# Patient Record
Sex: FEMALE | Race: BLACK OR AFRICAN AMERICAN | HISPANIC OR LATINO | Employment: UNEMPLOYED | URBAN - METROPOLITAN AREA
[De-identification: names, ages, dates, MRNs, and addresses within clinical notes are randomized per-mention and may not be internally consistent; named-entity substitution may affect disease eponyms.]

---

## 2017-06-05 ENCOUNTER — TRANSCRIBE ORDERS (OUTPATIENT)
Dept: ADMINISTRATIVE | Facility: HOSPITAL | Age: 52
End: 2017-06-05

## 2017-06-05 ENCOUNTER — TRANSCRIBE ORDERS (OUTPATIENT)
Dept: RADIOLOGY | Facility: HOSPITAL | Age: 52
End: 2017-06-05

## 2017-06-05 ENCOUNTER — HOSPITAL ENCOUNTER (OUTPATIENT)
Dept: RADIOLOGY | Facility: HOSPITAL | Age: 52
Discharge: HOME | End: 2017-06-05
Payer: COMMERCIAL

## 2017-06-05 DIAGNOSIS — G89.29 CHRONIC IDIOPATHIC ANAL PAIN: ICD-10-CM

## 2017-06-05 DIAGNOSIS — M19.90 SENILE ARTHRITIS: ICD-10-CM

## 2017-06-05 DIAGNOSIS — K62.89 CHRONIC IDIOPATHIC ANAL PAIN: ICD-10-CM

## 2017-06-05 DIAGNOSIS — R29.898 OTHER SYMPTOMS REFERABLE TO JOINT, SITE UNSPECIFIED: ICD-10-CM

## 2017-06-05 DIAGNOSIS — M19.90 SENILE ARTHRITIS: Primary | ICD-10-CM

## 2017-06-05 DIAGNOSIS — M79.10 MYALGIA: Primary | ICD-10-CM

## 2017-06-05 PROCEDURE — 72040 X-RAY EXAM NECK SPINE 2-3 VW: CPT

## 2017-07-17 ENCOUNTER — HOSPITAL ENCOUNTER (EMERGENCY)
Facility: HOSPITAL | Age: 52
Discharge: HOME | End: 2017-07-17
Attending: EMERGENCY MEDICINE | Admitting: EMERGENCY MEDICINE
Payer: COMMERCIAL

## 2017-07-17 VITALS
TEMPERATURE: 98.5 F | RESPIRATION RATE: 16 BRPM | SYSTOLIC BLOOD PRESSURE: 104 MMHG | HEART RATE: 77 BPM | DIASTOLIC BLOOD PRESSURE: 52 MMHG | OXYGEN SATURATION: 99 % | WEIGHT: 136 LBS

## 2017-07-17 DIAGNOSIS — M46.1 INFLAMMATION OF RIGHT SACROILIAC JOINT (HCC): Primary | ICD-10-CM

## 2017-07-17 PROCEDURE — 96372 THER/PROPH/DIAG INJ SC/IM: CPT

## 2017-07-17 PROCEDURE — 99283 EMERGENCY DEPT VISIT LOW MDM: CPT

## 2017-07-17 RX ORDER — METHOCARBAMOL 500 MG/1
500 TABLET, FILM COATED ORAL 2 TIMES DAILY
Qty: 20 TABLET | Refills: 0 | Status: SHIPPED | OUTPATIENT
Start: 2017-07-17 | End: 2017-07-27 | Stop reason: ALTCHOICE

## 2017-07-17 RX ORDER — KETOROLAC TROMETHAMINE 30 MG/ML
30 INJECTION, SOLUTION INTRAMUSCULAR; INTRAVENOUS ONCE
Status: COMPLETED | OUTPATIENT
Start: 2017-07-17 | End: 2017-07-17

## 2017-07-17 RX ORDER — IBUPROFEN 400 MG/1
400 TABLET ORAL EVERY 6 HOURS PRN
Qty: 30 TABLET | Refills: 0 | Status: SHIPPED | OUTPATIENT
Start: 2017-07-17 | End: 2017-07-27 | Stop reason: ALTCHOICE

## 2017-07-17 RX ORDER — LIDOCAINE 50 MG/G
1 PATCH TOPICAL ONCE
Status: DISCONTINUED | OUTPATIENT
Start: 2017-07-17 | End: 2017-07-17 | Stop reason: HOSPADM

## 2017-07-17 RX ADMIN — KETOROLAC TROMETHAMINE 30 MG: 30 INJECTION, SOLUTION INTRAMUSCULAR at 14:39

## 2017-07-17 RX ADMIN — LIDOCAINE 1 PATCH: 50 PATCH CUTANEOUS at 14:40

## 2017-07-20 ENCOUNTER — HOSPITAL ENCOUNTER (OUTPATIENT)
Dept: RADIOLOGY | Facility: HOSPITAL | Age: 52
Discharge: HOME | End: 2017-07-20
Payer: COMMERCIAL

## 2017-07-20 ENCOUNTER — TRANSCRIBE ORDERS (OUTPATIENT)
Dept: RADIOLOGY | Facility: HOSPITAL | Age: 52
End: 2017-07-20

## 2017-07-20 DIAGNOSIS — S39.012S: ICD-10-CM

## 2017-07-20 DIAGNOSIS — S39.012S: Primary | ICD-10-CM

## 2017-07-20 PROCEDURE — 72100 X-RAY EXAM L-S SPINE 2/3 VWS: CPT

## 2017-07-27 ENCOUNTER — HOSPITAL ENCOUNTER (EMERGENCY)
Facility: HOSPITAL | Age: 52
Discharge: HOME | End: 2017-07-27
Payer: COMMERCIAL

## 2017-07-27 VITALS
OXYGEN SATURATION: 99 % | RESPIRATION RATE: 18 BRPM | DIASTOLIC BLOOD PRESSURE: 72 MMHG | BODY MASS INDEX: 25.52 KG/M2 | HEIGHT: 60 IN | SYSTOLIC BLOOD PRESSURE: 132 MMHG | HEART RATE: 76 BPM | WEIGHT: 130 LBS | TEMPERATURE: 97.6 F

## 2017-07-27 DIAGNOSIS — M46.1 SACROILIITIS (HCC): Primary | ICD-10-CM

## 2017-07-27 PROCEDURE — 99283 EMERGENCY DEPT VISIT LOW MDM: CPT

## 2017-07-27 RX ORDER — BACLOFEN 10 MG/1
10 TABLET ORAL 3 TIMES DAILY
Qty: 12 TABLET | Refills: 0 | Status: SHIPPED | OUTPATIENT
Start: 2017-07-27 | End: 2018-06-10 | Stop reason: ALTCHOICE

## 2017-07-27 RX ORDER — METHYLPREDNISOLONE 4 MG/1
TABLET ORAL
Qty: 21 TABLET | Refills: 0 | Status: SHIPPED | OUTPATIENT
Start: 2017-07-27 | End: 2018-04-24

## 2017-08-03 ENCOUNTER — GENERIC CONVERSION - ENCOUNTER (OUTPATIENT)
Dept: OTHER | Facility: OTHER | Age: 52
End: 2017-08-03

## 2017-08-03 ENCOUNTER — APPOINTMENT (OUTPATIENT)
Dept: PHYSICAL THERAPY | Facility: OTHER | Age: 52
End: 2017-08-03
Payer: COMMERCIAL

## 2017-08-03 PROCEDURE — 97161 PT EVAL LOW COMPLEX 20 MIN: CPT

## 2017-08-07 ENCOUNTER — APPOINTMENT (OUTPATIENT)
Dept: PHYSICAL THERAPY | Facility: OTHER | Age: 52
End: 2017-08-07
Payer: COMMERCIAL

## 2017-08-07 PROCEDURE — 97140 MANUAL THERAPY 1/> REGIONS: CPT

## 2017-08-07 PROCEDURE — 97110 THERAPEUTIC EXERCISES: CPT

## 2017-08-11 ENCOUNTER — APPOINTMENT (OUTPATIENT)
Dept: PHYSICAL THERAPY | Facility: OTHER | Age: 52
End: 2017-08-11
Payer: COMMERCIAL

## 2017-08-11 PROCEDURE — 97140 MANUAL THERAPY 1/> REGIONS: CPT

## 2017-08-11 PROCEDURE — 97110 THERAPEUTIC EXERCISES: CPT

## 2017-08-14 ENCOUNTER — APPOINTMENT (OUTPATIENT)
Dept: PHYSICAL THERAPY | Facility: OTHER | Age: 52
End: 2017-08-14
Payer: COMMERCIAL

## 2017-08-18 ENCOUNTER — APPOINTMENT (OUTPATIENT)
Dept: PHYSICAL THERAPY | Facility: OTHER | Age: 52
End: 2017-08-18
Payer: COMMERCIAL

## 2017-08-21 ENCOUNTER — APPOINTMENT (OUTPATIENT)
Dept: PHYSICAL THERAPY | Facility: OTHER | Age: 52
End: 2017-08-21
Payer: COMMERCIAL

## 2017-08-25 ENCOUNTER — APPOINTMENT (OUTPATIENT)
Dept: PHYSICAL THERAPY | Facility: OTHER | Age: 52
End: 2017-08-25
Payer: COMMERCIAL

## 2017-08-28 ENCOUNTER — APPOINTMENT (OUTPATIENT)
Dept: PHYSICAL THERAPY | Facility: OTHER | Age: 52
End: 2017-08-28
Payer: COMMERCIAL

## 2018-04-24 ENCOUNTER — OFFICE VISIT (OUTPATIENT)
Dept: OBGYN CLINIC | Facility: HOSPITAL | Age: 53
End: 2018-04-24
Payer: COMMERCIAL

## 2018-04-24 VITALS
WEIGHT: 140.8 LBS | SYSTOLIC BLOOD PRESSURE: 95 MMHG | HEART RATE: 82 BPM | HEIGHT: 60 IN | DIASTOLIC BLOOD PRESSURE: 61 MMHG | BODY MASS INDEX: 27.64 KG/M2

## 2018-04-24 DIAGNOSIS — Z12.39 BREAST CANCER SCREENING: ICD-10-CM

## 2018-04-24 DIAGNOSIS — B96.89 BACTERIAL VAGINOSIS: ICD-10-CM

## 2018-04-24 DIAGNOSIS — Z01.419 WOMEN'S ANNUAL ROUTINE GYNECOLOGICAL EXAMINATION: Primary | ICD-10-CM

## 2018-04-24 DIAGNOSIS — N76.0 BACTERIAL VAGINOSIS: ICD-10-CM

## 2018-04-24 PROCEDURE — 99396 PREV VISIT EST AGE 40-64: CPT | Performed by: NURSE PRACTITIONER

## 2018-04-24 PROCEDURE — 3725F SCREEN DEPRESSION PERFORMED: CPT | Performed by: NURSE PRACTITIONER

## 2018-04-24 PROCEDURE — 87210 SMEAR WET MOUNT SALINE/INK: CPT | Performed by: NURSE PRACTITIONER

## 2018-04-24 RX ORDER — METRONIDAZOLE 500 MG/1
500 TABLET ORAL EVERY 12 HOURS SCHEDULED
Qty: 14 TABLET | Refills: 0 | Status: SHIPPED | OUTPATIENT
Start: 2018-04-24 | End: 2018-05-01

## 2018-04-24 NOTE — PROGRESS NOTES
Subjective      Madison Marin is a 48 y o  female who presents for annual well woman exam  Last pap 2015 resulted ASCUS and HPV negative  Hysterectomy approximately 2-3 years ago for fibroids  Denies intermenstrual bleeding, spotting, or discharge  Current contraception: status post hysterectomy  History of abnormal Pap smear: yes -   Family history of uterine or ovarian cancer: no  Regular self breast exam: yes  History of abnormal mammogram: no  Family history of breast cancer: no  History of abnormal lipids: no  Menstrual History:  OB History      Para Term  AB Living    2 2 1 1   2    SAB TAB Ectopic Multiple Live Births                      Menarche age: 18-   Amenorrhea since hysterectomy approximately 2-3 years ago  Hysterectomy was done for fibroid uterus  Menstrual history below  Patient's last menstrual period was 2012  Period Cycle (Days):  (monthly)  Period Duration (Days): 5-6 days    The following portions of the patient's history were reviewed and updated as appropriate: allergies, current medications, past family history, past medical history, past social history, past surgical history and problem list     Review of Systems  Pertinent items are noted in HPI        Objective      BP 95/61 (BP Location: Left arm)   Pulse 82   Ht 5' (1 524 m)   Wt 63 9 kg (140 lb 12 8 oz)   LMP 2012   BMI 27 50 kg/m²     General:   alert and oriented, in no acute distress, alert, appears stated age and cooperative   Heart: regular rate and rhythm, S1, S2 normal, no murmur, click, rub or gallop   Lungs: clear to auscultation bilaterally   Abdomen: soft, non-tender, without masses or organomegaly, nontender and no masses palpated   Vulva: normal   Vagina: thin grey discharge, pH 5 0, wet prep done, + whiff   Cervix: surgically absent   Uterus: surgically absent   Adnexa: no mass, fullness, tenderness    breast: nontender, no palpable masses, no skin changes no nipple discharge bilaterally  Assessment      @[email or web address]@   Plan      All questions answered  Breast self exam technique reviewed and patient encouraged to perform self-exam monthly  Diagnosis explained in detail, including differential   Discussed healthy lifestyle modifications  Educational material distributed  Follow up in 1 year for annual    Follow up as needed  KOH prep  Mammogram   Wet prep  Breast awareness reviewed     had colonoscopy last year- patient states she is due again in 10 years  Vaginal discharge noted on exam     - wet mount/KOH positive for bacterial vaginosis     - Rx metronidazole 500 mg 1 tablet every 12 hours x7 days sent electronically to pharmacy  Patient aware in verbalizes understanding to avoid alcohol consumption while using medication

## 2018-04-24 NOTE — PATIENT INSTRUCTIONS
Pap Smear   GENERAL INFORMATION:   What is a Pap smear? A Pap smear, or Pap test, is a procedure to check your cervix for abnormal cells  The cervix is the narrow opening at the bottom of your uterus  The cervix meets the top part of the vagina  How do I prepare for a Pap smear? The best time to schedule the test is right after your period stops  Do not have a Pap smear during your monthly period  Do not have intercourse or put anything in your vagina for 24 hours before your test    What will happen during a Pap smear? · You will lie on your back and place your feet on footrests called stirrups  Your caregiver will gently insert a device called a speculum into your vagina  The speculum is used to spread the walls of your vagina so he can see your cervix  He will use a thin brush or cotton swab to collect cells from the inside of your cervix  · Your caregiver will also collect cells from the surface of your cervix with a plastic or wooden tool called a spatula  He may also gently scrape the upper part of your vagina for a sample  The samples are placed in a container with liquid or on a glass slide  They are sent to a lab and examined for abnormal cells  How often do I need a Pap smear? Pap smears are usually done every 1 to 3 years  You may need a Pap smear more often if you have any of the following:  · Positive test result for the human papillomavirus (HPV)    · Cervical intraepithelial neoplasm or cervical cancer    · HIV    · A weak immune system    · Exposure to diethylstilbestrol (JASON) medicine when your mother was pregnant with you  CARE AGREEMENT:   You have the right to help plan your care  Learn about your health condition and how it may be treated  Discuss treatment options with your caregivers to decide what care you want to receive  You always have the right to refuse treatment  The above information is an  only   It is not intended as medical advice for individual conditions or treatments  Talk to your doctor, nurse or pharmacist before following any medical regimen to see if it is safe and effective for you  © 2014 [address] is for End User's use only and may not be sold, redistributed or otherwise used for commercial purposes  All illustrations and images included in CareNotes® are the copyrighted property of A D A M , Inc  or Ren Mckinnon  Autoexamen del seno para las mujeres   LO QUE NECESITA SABER:   ¿Qué es el autoexamen de seno? Un autoexamen de seno es Mirella de revisar si roosevelt senos tienen protuberancias u otros cambios  Los autoexámenes de seno regulares pueden ayudarla a conocer cómo se rigoberto y se sienten roosevelt senos normalmente  La mayoría de las protuberancias o cambios en el seno  no son cáncer, chrissy usted siempre debería ir con hodge médico para que la revise  Hodge médico también puede observarla e indicarle si usted está realizando hodge autoexamen correctamente  ¿Por qué debería realizarme un autoexamen de seno? El cáncer de seno es el tipo de cáncer más común en las mujeres  Aún si a usted le [employer], todavía puede seguir revisándose regularmente  Si usted sabe cómo se sienten y se rigoberto roosevelt senos normalmente, eso podría ayudarle a determinar cuándo comunicarse con hodge médico  Es posible que clayton mamografía no detecte algún cáncer  Usted podría encontrar clayton protuberancia keya un autoexamen de seno que no se detectó con hodge mamografía  ¿Cuándo debería realizarme un autoexamen de seno? Chris hodge calendario para que recuerde hacerse un autoexamen del seno en clayton fecha regular  Clayton forma fácil de recordar es realizar el autoexamen de seno en el mismo día cada mes  Si usted tiene [place], es posible que lo mejor sea que se dwayne un autoexamen 1 semana después de que terminó hodge periodo  [place] es cuando roosevelt senos podrían estar menos inflamados, abultados o sensibles   Usted puede realizarse autoexámenes del seno regulares incluso si está dando de lactar o si tiene implantes en el seno  ¿Cómo debería realizarme un autoexamen de seno? · Observe roosevelt senos en un joann  Observe el tamaño y la forma de cada seno y del pezón  Revise si hay inflamación, protuberancias, hoyuelos, piel descamada u otros cambios en la piel  Vigile los cambios en el pezón, grant cuando tiene dolor o que comienza a hundirse  Apriete cuidadosamente ambos pezones y revise si sale líquido (que no sea [employer]) de ellos  Si usted detecta cualquiera de estos u otros cambios en roosevelt senos, comuníquese con hodge médico  Revise roosevelt senos mientras está sentada o styles en las 3 siguientes posiciones:    ¨ Cuelgue roosevelt brazos en roosevelt costados  ¨ Levante roosevelt omayra y [place] detrás de hodge temi  ¨ Ejerza presión firme con roosevelt omayra sobre roosevelt caderas  Inclínese un poco hacia adelante mientras observa roosevelt senos en el joann  · Acuéstese y palpe roosevelt senos  Cuando usted se [place], el tejido de roosevelt senos se extiende uniformemente sobre hodge pecho  [place] facilita que usted sienta protuberancias o cualquier cosa que podría no ser normal para roosevelt senos  Realice un autoexamen con un seno a la vez  ¨ Coloque clayton almohada pequeña o clayton toalla debajo de hodge hombro silvino  Coloque hodge brazo silvino detrás de hodge temi  ¨ Use los 3 dedos medios de hodge mano derecha  Use las yemas de los dedos, en la parte superior  La yema del dedo es la parte más sensible de hodge dedo  ¨ Kate círculos pequeños para sentir el tejido del seno  Use las yemas de roosevelt dedos para hacer círculos pequeños empalmados sobre roosevelt senos y [place]  Use presión ligera, media y firme  [place], presione ligeramente  Después, presione con clayton presión media para sentir un poco más profundo en hodge seno  Por último, use presión firme para sentir hodge seno en lo más profundo  ¨ Examine el área completa de hodge seno    Examine el área del seno desde arriba hasta abajo donde usted siente las costillas  [place] pequeños con las yemas de los dedos, comenzando en la parte media de hodge axila  [place] subiendo y [place] el área del seno  Continúe hacia hodge seno, hasta llegar al [employer]  Examine toda el área desde la axila hasta el centro de hodge pecho ([place])  Deténgase en el centro de hodge pecho  ¨ Mueva la almohada o toalla hacia hodge hombro derecho y coloque hodge brazo derecho detrás de hodge temi  Use las yemas de los 3 dedos medios de hodge mano izquierda y repita los pasos anteriores para realizar un autoexamen en hodge seno derecho  ¿Qué más puedo hacer para la revisión de problemas de seno o del cáncer de seno? Algunos expertos sugieren que las mujeres de 36 años de edad y mayores deberían realizarse clayton mamografía cada año  Otros sugieren [employer] 50 y 78 años de edad se henry clayton mamografía cada 2 años  Consulte con hodge médico sobre cuándo usted debería [employer]  ¿Cuándo ejsse comunicarme con mi médico?   · Usted encuentra algún tipo de bulto o cambio en roosevelt senos  · Usted tiene [facility], o  sale líquido de roosevelt pezones  · Usted tiene preguntas o inquietudes acerca de hodge condición o cuidado  ACUERDOS SOBRE HODGE CUIDADO:   Usted tiene el derecho de ayudar a planear hodge cuidado  Aprenda todo lo que pueda sobre hodge condición y grant darle tratamiento  Discuta roosevelt opciones de tratamiento con roosevelt médicos para decidir el cuidado que usted desea recibir  Usted siempre tiene el derecho de rechazar el tratamiento  Esta información es sólo para uso en educación  Hodge intención no es darle un consejo médico sobre enfermedades o tratamientos  Colsulte con hodge [place] farmacéutico antes de seguir cualquier régimen médico para saber si es seguro y efectivo para usted  © 2017 [address] Information is for End User's use only and may not be sold, redistributed or otherwise used for commercial purposes   All illustrations and images included in CareNotes® are the copyrighted property of A D A M , Inc  or Ren Mckinnon  Cáncer de seno en mujeres   CUIDADO AMBULATORIO:   El cáncer de seno  comienza en el tejido o en los conductos del seno  Las células cancerosas del seno podrían propagarse a otras partes del cuerpo, grant al hígado, al pulmón y al cerebro  Los síntomas más comunes incluyen los siguientes:   · Inflamación o clayton masa en el seno    · Sangrado o clayton secreción transparente que sale del pezón    · Senos adoloridos o inflamados    · Piel con hoyuelos, grant la piel de naranja    · El pezón se ve grant si lo hubieran empujado hacia adentro (pezón invertido)    · De repente la piel del seno se pone bob    · Inflamación de los ganglios linfáticos debajo el brazo       Andi al 911 en basilio de presentar lo siguiente:   · Usted tiene dolor en el pecho  · Usted repentinamente se siente mareado o con falta de aliento  Comuníquese con hodge oncólogo si:   · Usted tiene fiebre  · Usted tiene un dolor nuevo o distinto en hodge cuerpo  · Usted tiene vómitos y no puede retener líquidos ni alimentos en el estómago  · Usted está deprimido o siente que no puede hacer frente a hodge enfermedad  · Usted tiene preguntas o inquietudes acerca de hodge condición o cuidado  El tratamiento de cáncer de seno  depende del tamaño del tumor, si se ha propagado y si responde a las hormonas  Usted podría necesitar más de chet de los siguientes:  · Los medicamentos con hormonas  se podría usar si el cáncer es susceptible a las hormonas  · La radioterapia  [place] destellos de rosaura x de emilee energía para eliminar las células cancerosas  · La quimioterapia  se usan medicamentos para [address] [place] células cancerosas  Usted podría recibir un medicamento o clayton combinación de medicamentos  · La terapia dirigida  es un medicamento que encuentra marcadores en algunas células cancerosas y [address] [place] células      · Cirugía  Se puede usar para extirpar el tumor  Exploración mamaria  incluye los siguientes:  · Clayton autoexploración mamaria  se realiza todos los [place]  Revise roosevelt senos para detectar cualquier cambio o masas  Si tiene [employer], examine los senos después de que termine el periodo  Comuníquese con hodge oncólogo si usted nota cualquier cambio en roosevelt senos  Pregunte por más información acerca de cómo autoexaminarse los senos  · Mamografías  se puede necesitar cada 6 a 12 meses  Pregunte si necesita [place] y con qué frecuencia  Si usted tiene la menstruación, programe la [employer] las primeras 2 semanas después del periodo  Maneje hodge cáncer de seno:   · Consuma alimentos saludables y variados  Los alimentos saludables incluyen frutas, verduras, pan integral, productos lácteos bajos en grasa, frijoles, john magras y pescado  Pregunte si necesita seguir clayton dieta especial     · [employer] líquidos grant se le haya indicado  Pregunte cuánto líquido debe mj cada día y cuáles líquidos son los más adecuados para usted  [employer] más líquidos para evitar la deshidratación  Usted también tendrá que reemplazar líquidos si vomita o tiene diarrea debido a los tratamientos del cáncer  · Ejercítese según indicaciones  Pregunte a hodge oncólogo acerca del mejor plan de ejercicios para usted  El ejercicio puede disminuir los efectos secundarios del tratamiento, grant las Hammad, vómito y debilidad  También le puede ayudar a mejorar hodge estado de ánimo  Suspenda el ejercicio si siente dolor en el pecho, tiene dificultad para respirar o se siente mareada  No dwayne ejercicio si tiene fiebre o si en las ultimas 24 horas le stringer administrado medicamento para el cáncer por vía intravenosa  · No fume  La nicotina puede dañar los vasos sanguíneos y dificultarle el manejo de hodge cáncer de seno  El fumar aumenta hodge riesgo de presentar un nuevo cáncer o que el cáncer reaparezca y demorar el tiempo de recuperación después del [place]   No use cigarrillos electrónicos o tabaco sin humo en vez de cigarrillos o para tratar de dejar de fumar  Todos estos aún contienen nicotina  Pida a miller médico información si usted fuma actualmente y [place] para dejar de hacerlo  · Limite o no consuma bebidas alcohólicas, según indicaciones  Miller oncólogo podría pedirle que limite el consumo de alcohol o que no lo consuma  El alcohol podría aumentar el riesgo de que miller cáncer de seno regrese  Limite el consumo de alcohol a 1 trago por día  Un trago equivale a 12 onzas de cerveza, 5 onzas de vino o 1 onza y ½ de licor  Programe clayton catherine de seguimiento con miller oncólogo según indicaciones:  Usted necesitará acudir al oncólogo para recibir tratamiento continuo y para citas de control  Anote roosevelt preguntas para que se acuerde de hacerlas keya roosevelt visitas  © 2017 [facility] Information is for End User's use only and may not be sold, redistributed or otherwise used for commercial purposes  All illustrations and images included in CareNotes® are the copyrighted property of A D A M , Inc  or Ren Mckinnon  Esta información es sólo para uso en educación  Miller intención no es darle un consejo médico sobre enfermedades o tratamientos  Colsulte con miller Benito Cranker farmacéutico antes de seguir cualquier régimen médico para saber si es seguro y efectivo para usted  Vaginosis bacteriana   LO QUE NECESITA SABER:   ¿Qué es la vaginosis bacteriana? La vaginosis bacteriana es clayton infección en la vagina  Se puede desarrollar vaginitis, la cual es clayton irritación e inflamación de la vagina  ¿Cuáles son las causas de la vaginosis bacteriana? Se desconoce la causa de la vaginosis bacteriana  Con la vaginosis bacteriana, existe un desequilibrio en la bacteria que normalmente se encuentra en la vagina  Miller riesgo de presentar vaginosis bacteriana se aumenta si usted mantiene clayton rajeev sexual Bartholome Dennis   Miller riesgo también se aumenta si usted Alexandra Gutiérrez vaginales o dispositivos intrauterinos (DIU)  ¿Cuáles son los signos y síntomas de la vaginosis bacteriana? Algunas mujeres no presentan síntomas  Puede que usted tenga lo siguiente:  · Flujo vaginal de color minor, oly o amarillo    · Flujo vaginal con un desagradable olor a pescado    · Picazón o ardor en el área circundante afuera de miller vagina  ¿Cómo se diagnostica la vaginosis bacteriana? Miller médico le realizará un examen físico y le preguntará si sufre de otras condiciones de [place]  Él puede mj [place] del flujo de miller vagina  [place] se analizará para detectar la bacteria que provoca la vaginosis bacteriana  ¿Cuál es el tratamiento para la vaginosis bacteriana? Se administran antibióticos para matar la bacteria que causa la vaginosis bacteriana  Se pueden recetar en forma de píldora o crema para introducir en miller vagina  [place] o use el medicamento grant se lo indicaron  ¿Cuáles son los riesgos de la vaginosis bacteriana? Sin tratamiento, la vaginosis bacteriana se puede propagar y desarrollar en infecciones graves en miller [place] y trompas de [place]  [address]  La vaginitis bacteriana puede aumentar miller riesgo de contraer otras infecciones de transmisión sexual (ITS), grant la clamidia, gonorrea, o el virus de inmunodeficiencia humano ([place])  ¿Cómo puedo prevenir la vaginosis bacteriana? · Mantenga el área vaginal limpio y seco:  Delle Brine interior y pantimedias hechas de algodón en el área de la entrepierna  Límpiese de adelante hacia atrás después de orinar o de tener clayton evacuación intestinal  Después de bañarse, enjuague el jabón de la abe vaginal para disminuir el riesgo de clayton irritación  · No utilice productos que provocan irritación:  Siempre use tampones y toallas sanitarias sin aroma  No use atomizadores, ni talcos, ni tampones con aroma debido a que pueden causar irritación y aumentar miller riesgo de presentar vaginosis bacteriana   Los detergentes y suavizantes para la ropa también pueden causar irritación  · No se dwayne duchas vaginales:  Estas pueden provocar un desequilibrio de la bacteria saludable de la vagina  · Utilice preservativos o condones de látex:  Estos ayudan a prevenir contra otras infecciones y svetlana que howell cesar contraiga la infección  ¿Cuándo jesse comunicarme con mi médico?   · Roosevelt síntomas regresan o no mejoran con el tratamiento  · Usted tiene sangrado vaginal que no es de howell Eek  · Usted tiene preguntas o inquietudes acerca de howell condición o cuidado  ACUERDOS SOBRE HOWELL CUIDADO:   Usted tiene el derecho de ayudar a planear howell cuidado  Aprenda todo lo que pueda sobre howell condición y grant darle tratamiento  Discuta roosevelt opciones de tratamiento con roosevelt médicos para decidir el cuidado que usted desea recibir  Usted siempre tiene el derecho de rechazar el tratamiento  Esta información es sólo para uso en educación  Howell intención no es darle un consejo médico sobre enfermedades o tratamientos  Colsulte con howell Dorna Hang farmacéutico antes de seguir cualquier régimen médico para saber si es seguro y efectivo para usted  © 2017 [address] [facility] Information is for End User's use only and may not be sold, redistributed or otherwise used for commercial purposes  All illustrations and images included in CareNotes® are the copyrighted property of A D A M , Inc  or Ren Mckinnon  Metronidazol (Por la boca)   Trata infecciones bacterianas  Gabi(s) : Flagyl, Flagyl 375   Existen muchas otras marcas de EDAN  [place] medicamento no debe ser usado cuando:   [place] medicamento no es adecuado para todas las personas  No lo use si alguna vez ha tenido clayton reacción alérgica al metronidazol u otros medicamentos similares  No use kaleigh medicamento para el tratamiento de la tricomoniasis si [employer] 3 primeros meses de Beryl Villada de usar kaleigh medicamento:   Kenneth Caras de liberación prolongada  · Osorio-Laurence medicamento siguiendo las indicaciones, y lo debe mj a la misma hora todos los días  · Cápsula o tableta: Tómela con comida o leche para evitar que le cause malestar estomacal   · Tableta de liberación prolongada: Tómela con el estómago vacío, 1 hora antes o 2 horas después de [place] Pros[place]  · Trague la tableta de liberación prolongada entera  No triture, rompa o mastique  · [place] todo hodge medicamento recetado para eliminar hodge infección por completo aunque usted se sienta mejor después de las primeras dosis  · Si olvida clayton dosis:Si olvida clayton dosis de hodge medicamento, tómelo lo más pronto posible  Si es karmen la hora para hodge próxima dosis, espere hasta entonces para mj hodge dosis regular  No use medicamento adicional para reponer la dosis olvidada  · Guarde el medicamento en un recipiente cerrado a temperatura ambiente y alejado del calor, la humedad y la [place] directa  Medicamentos y [employer] que debe evitar:   Consulte con hodge médico o farmacéutico antes de usar cualquier medicamento, incluyendo los que compra sin receta médica, las vitaminas y los productos herbales  · No use kenny medicamento si ha tomado disulfiram en las últimas 2 semanas  No colleen alcohol o use medicamentos que contengan alcohol o propilenglicol mientras esté usando kenny medicamento y WATZING por lo menos 3 días después de scottie terminado el tratamiento con metronidazol  · Algunos alimentos y medicamentos pueden afectar la manera grant metronidazol funciona  Informe a hodge médico si [place] busulfán, cimetidina, litio, fenobarbital, fenitoína, warfarina u otro anticoagulante  Precauciones keya el uso de kenny medicamento:   · Informe a hodge médico si está embarazada o amamantando, si tiene clayton enfermedad renal, hepática o problemas con la médula ósea, candidiasis oral, clayton infección micótica vaginal o un historial de convulsiones    · Kenny medicamento puede causar los siguientes problemas:  ¨ Problemas cerebrales o del sistema nervioso, incluyendo convulsiones, meningitis o problemas de visión  · Tratamiento para la tricomoniasis:  Es posible que hodge médico también quiera que hodge cesar sexual reciba tratamiento, incluso si no tiene síntomas  Es preferible que use un condón o preservativo cuando tenga relaciones sexuales  [place] ayudará a que usted no vuelva a contraer la infección de hodge cesar  Consulte con hodge médico si tiene alguna pregunta  · Llame a hodge médico si roosevelt síntomas no mejoran, o si Jenness Poot  · El médico solicitará exámenes de laboratorio keya las citas de rutina para revisar los efectos de mediafeedia  Asista a todas roosevelt citas  · Dígale a todo médico o dentista encargado de atenderle que usted está usando mediafeedia  Puede que kenny medicamento afecte algunos resultados de AVIcode  · Guarde todos los medicamentos fuera del alcance de los niños  Nunca comparta roosevelt medicamentos con [place]  Efectos secundarios que pueden presentarse keya el uso de kenny medicamento:   Consulte inmediatamente con el médico si nota cualquiera de estos efectos secundarios:  · Reacción alérgica: Comezón o ronchas, hinchazón del mechelle o las omayra, hinchazón u hormigueo en la boca o garganta, opresión en el pecho, dificultad para respirar  · Confusión, somnolencia, fiebre, dolor de temi, pérdida del apetito, náusea o vómito, rigidez de cordelia o espalda  · Mareo, problemas con el control muscular, torpeza, temblores, dificultad para hablar  · Yovani, escalofríos, tos, dolor de garganta, o el cuerpo adolorido  · Pérdida de la sensación, hormigueo o ardor [employer], los brazos, las piernas o los pies  · Convulsiones  Consulte con el médico si nota los siguientes efectos secundarios menos graves:   · Náuseas leves  · Sabor inusual o desagradable en la boca  Consulte con el médico si nota otros efectos secundarios que sofya son causados por kenny medicamento     Llame a hodge médico para [employer] efectos secundarios  Usted puede notificar roosevelt efectos secundarios al FDA al [phone]  © 2017 [address] Information is for End User's use only and may not be sold, redistributed or otherwise used for commercial purposes  Esta información es sólo para uso en educación  Hodge intención no es darle un consejo médico sobre enfermedades o tratamientos  Colsulte con hodge [place] farmacéutico antes de seguir cualquier régimen médico para saber si es seguro y efectivo para usted

## 2018-05-18 ENCOUNTER — TRANSCRIBE ORDERS (OUTPATIENT)
Dept: RADIOLOGY | Facility: CLINIC | Age: 53
End: 2018-05-18

## 2018-05-24 ENCOUNTER — APPOINTMENT (OUTPATIENT)
Dept: LAB | Facility: CLINIC | Age: 53
End: 2018-05-24
Payer: COMMERCIAL

## 2018-05-24 DIAGNOSIS — F41.1 GENERALIZED ANXIETY DISORDER: ICD-10-CM

## 2018-05-24 DIAGNOSIS — F25.1 SCHIZOAFFECTIVE DISORDER, DEPRESSIVE TYPE (HCC): Primary | ICD-10-CM

## 2018-05-24 LAB
25(OH)D3 SERPL-MCNC: 28.5 NG/ML (ref 30–100)
ALBUMIN SERPL BCP-MCNC: 3.7 G/DL (ref 3.5–5)
ALP SERPL-CCNC: 64 U/L (ref 46–116)
ALT SERPL W P-5'-P-CCNC: 30 U/L (ref 12–78)
ANION GAP SERPL CALCULATED.3IONS-SCNC: 3 MMOL/L (ref 4–13)
AST SERPL W P-5'-P-CCNC: 15 U/L (ref 5–45)
BASOPHILS # BLD AUTO: 0 THOUSANDS/ΜL (ref 0–0.1)
BASOPHILS NFR BLD AUTO: 0 % (ref 0–1)
BILIRUB SERPL-MCNC: 0.45 MG/DL (ref 0.2–1)
BUN SERPL-MCNC: 10 MG/DL (ref 5–25)
CALCIUM SERPL-MCNC: 8.9 MG/DL (ref 8.3–10.1)
CHLORIDE SERPL-SCNC: 110 MMOL/L (ref 100–108)
CHOLEST SERPL-MCNC: 147 MG/DL (ref 50–200)
CO2 SERPL-SCNC: 29 MMOL/L (ref 21–32)
CREAT SERPL-MCNC: 0.6 MG/DL (ref 0.6–1.3)
EOSINOPHIL # BLD AUTO: 0.04 THOUSAND/ΜL (ref 0–0.61)
EOSINOPHIL NFR BLD AUTO: 1 % (ref 0–6)
ERYTHROCYTE [DISTWIDTH] IN BLOOD BY AUTOMATED COUNT: 13 % (ref 11.6–15.1)
EST. AVERAGE GLUCOSE BLD GHB EST-MCNC: 103 MG/DL
GFR SERPL CREATININE-BSD FRML MDRD: 122 ML/MIN/1.73SQ M
GLUCOSE P FAST SERPL-MCNC: 86 MG/DL (ref 65–99)
HBA1C MFR BLD: 5.2 % (ref 4.2–6.3)
HCT VFR BLD AUTO: 41.3 % (ref 34.8–46.1)
HDLC SERPL-MCNC: 43 MG/DL (ref 40–60)
HGB BLD-MCNC: 13.6 G/DL (ref 11.5–15.4)
LDLC SERPL CALC-MCNC: 85 MG/DL (ref 0–100)
LYMPHOCYTES # BLD AUTO: 1.69 THOUSANDS/ΜL (ref 0.6–4.47)
LYMPHOCYTES NFR BLD AUTO: 46 % (ref 14–44)
MCH RBC QN AUTO: 29.1 PG (ref 26.8–34.3)
MCHC RBC AUTO-ENTMCNC: 32.9 G/DL (ref 31.4–37.4)
MCV RBC AUTO: 88 FL (ref 82–98)
MONOCYTES # BLD AUTO: 0.21 THOUSAND/ΜL (ref 0.17–1.22)
MONOCYTES NFR BLD AUTO: 6 % (ref 4–12)
NEUTROPHILS # BLD AUTO: 1.73 THOUSANDS/ΜL (ref 1.85–7.62)
NEUTS SEG NFR BLD AUTO: 47 % (ref 43–75)
NONHDLC SERPL-MCNC: 104 MG/DL
NRBC BLD AUTO-RTO: 0 /100 WBCS
PLATELET # BLD AUTO: 163 THOUSANDS/UL (ref 149–390)
PMV BLD AUTO: 10 FL (ref 8.9–12.7)
POTASSIUM SERPL-SCNC: 4 MMOL/L (ref 3.5–5.3)
PROT SERPL-MCNC: 7.1 G/DL (ref 6.4–8.2)
RBC # BLD AUTO: 4.67 MILLION/UL (ref 3.81–5.12)
SODIUM SERPL-SCNC: 142 MMOL/L (ref 136–145)
TRIGL SERPL-MCNC: 95 MG/DL
TSH SERPL DL<=0.05 MIU/L-ACNC: 1.07 UIU/ML (ref 0.36–3.74)
WBC # BLD AUTO: 3.68 THOUSAND/UL (ref 4.31–10.16)

## 2018-05-24 PROCEDURE — 82306 VITAMIN D 25 HYDROXY: CPT

## 2018-05-24 PROCEDURE — 80061 LIPID PANEL: CPT

## 2018-05-24 PROCEDURE — 83036 HEMOGLOBIN GLYCOSYLATED A1C: CPT

## 2018-05-24 PROCEDURE — 85025 COMPLETE CBC W/AUTO DIFF WBC: CPT

## 2018-05-24 PROCEDURE — 84443 ASSAY THYROID STIM HORMONE: CPT

## 2018-05-24 PROCEDURE — 36415 COLL VENOUS BLD VENIPUNCTURE: CPT

## 2018-05-24 PROCEDURE — 80053 COMPREHEN METABOLIC PANEL: CPT

## 2018-06-10 RX ORDER — ACETAMINOPHEN 160 MG
TABLET,DISINTEGRATING ORAL
COMMUNITY
Start: 2015-06-02 | End: 2018-06-12 | Stop reason: SDUPTHER

## 2018-06-10 RX ORDER — BUPROPION HYDROCHLORIDE 300 MG/1
TABLET ORAL
COMMUNITY

## 2018-06-12 ENCOUNTER — OFFICE VISIT (OUTPATIENT)
Dept: INTERNAL MEDICINE CLINIC | Facility: CLINIC | Age: 53
End: 2018-06-12
Payer: COMMERCIAL

## 2018-06-12 VITALS
HEART RATE: 80 BPM | TEMPERATURE: 98.3 F | HEIGHT: 60 IN | SYSTOLIC BLOOD PRESSURE: 100 MMHG | DIASTOLIC BLOOD PRESSURE: 62 MMHG | WEIGHT: 138.45 LBS | BODY MASS INDEX: 27.18 KG/M2

## 2018-06-12 DIAGNOSIS — G89.4 CHRONIC PAIN DISORDER: ICD-10-CM

## 2018-06-12 DIAGNOSIS — J30.1 SEASONAL ALLERGIC RHINITIS DUE TO POLLEN: ICD-10-CM

## 2018-06-12 DIAGNOSIS — F41.8 DEPRESSION WITH ANXIETY: ICD-10-CM

## 2018-06-12 DIAGNOSIS — Z12.11 SCREENING FOR COLON CANCER: ICD-10-CM

## 2018-06-12 DIAGNOSIS — M25.50 ARTHRALGIA, UNSPECIFIED JOINT: ICD-10-CM

## 2018-06-12 DIAGNOSIS — E55.9 VITAMIN D DEFICIENCY: Primary | ICD-10-CM

## 2018-06-12 PROBLEM — Z87.442 HISTORY OF KIDNEY STONES: Status: ACTIVE | Noted: 2018-06-12

## 2018-06-12 PROCEDURE — 99204 OFFICE O/P NEW MOD 45 MIN: CPT | Performed by: PHYSICIAN ASSISTANT

## 2018-06-12 RX ORDER — ACETAMINOPHEN 160 MG
2000 TABLET,DISINTEGRATING ORAL DAILY
Qty: 90 CAPSULE | Refills: 1 | Status: SHIPPED | OUTPATIENT
Start: 2018-06-12 | End: 2019-01-18 | Stop reason: SDUPTHER

## 2018-06-12 RX ORDER — BUPROPION HYDROCHLORIDE 300 MG/1
300 TABLET ORAL DAILY
COMMUNITY
End: 2019-07-19 | Stop reason: SDUPTHER

## 2018-06-12 RX ORDER — LORATADINE 10 MG/1
10 TABLET ORAL DAILY
Qty: 90 TABLET | Refills: 1 | Status: SHIPPED | OUTPATIENT
Start: 2018-06-12 | End: 2019-10-29

## 2018-06-12 RX ORDER — GABAPENTIN 100 MG/1
100 CAPSULE ORAL 3 TIMES DAILY
COMMUNITY
End: 2018-12-12

## 2018-06-12 RX ORDER — BUSPIRONE HYDROCHLORIDE 15 MG/1
15 TABLET ORAL 2 TIMES DAILY
COMMUNITY

## 2018-06-12 RX ORDER — ALPRAZOLAM 0.5 MG/1
TABLET ORAL 2 TIMES DAILY PRN
COMMUNITY

## 2018-06-12 NOTE — PROGRESS NOTES
Assessment/Plan:    No problem-specific Assessment & Plan notes found for this encounter  Diagnoses and all orders for this visit:    Vitamin D deficiency  -     Cholecalciferol (VITAMIN D3) 2000 units capsule; Take 1 capsule (2,000 Units total) by mouth daily for 180 days    Chronic pain disorder  -     Lyme Antibody Profile with reflex to WB  -     RF Screen w/ Reflex to Titer  -     Cyclic citrul peptide antibody, IgG  -     MUNIRA Screen w/ Reflex to Titer/Pattern    Depression with anxiety    Arthralgia, unspecified joint    Seasonal allergic rhinitis due to pollen  -     loratadine (CLARITIN) 10 mg tablet; Take 1 tablet (10 mg total) by mouth daily for 180 days    Screening for colon cancer  -     Occult Bloood,Fecal Immunochemical; Future    Other orders  -     busPIRone (BUSPAR) 15 mg tablet; Take 15 mg by mouth 2 (two) times a day  -     gabapentin (NEURONTIN) 100 mg capsule; Take 100 mg by mouth 3 (three) times a day  -     buPROPion (WELLBUTRIN XL) 300 mg 24 hr tablet; Take 300 mg by mouth daily  -     ALPRAZolam (XANAX) 0 5 mg tablet; Take by mouth 2 (two) times a day as needed          Subjective:      Patient ID: Mauro Swanson is a 48 y o  female  Transfer of care was going to [facility] [place] in the past   Currently followed for  at [facility] recently had meds changed/ did not get filled yet  Reports is seen once a month  Waiting on call back as had to cancel her mammogram    Never had colon cancer screening  Reports father had prostate cancer but no once known to have had colon cancer  Patient had labs completed may 2018, Vit D slightly low at 30 but improved since 2014 when was 16, sometimes remembers to take Vit D but not daily    History of kidney stone once states drinks coconut water and ice tea      Was seeing pain management in the past  But not recently , reports had some injections in the past and PT, states did get some improvement , states last seen Aug or Sept 2017 records shows had L shoulder tendonitis    Pt actually reports her pain is widespread, has in wrists, shoulders, back  Neck   Reports the pain is not all day nor every day  States joints do not get swollen or red  Patient did have x-rays of her neck and lower back in the past   Review shows that lumbar was normal and minimal degeneration at C5-6  As noted above patient does not exclusively have pain in her neck and low back only she has more pain in her joints  Patient also reports that she has some irritation around a ventral scar  Patient reports she had 2  sections and was opened in the same location both times  Patient reports she sometimes feels irritation in the area and that her underwear and pants sometimes rub against the scar  We discussed that she had a CT scan in the past for her kidney stones and as there was no abdominal abnormalities including no hernia noted through the scar line that the surgeons would not be able to go in and fix this scar at this time  We discussed that however in the future if she has any abnormalities or bulging or pain then we would get her in with the surgeons  No other meds except for MH        The following portions of the patient's history were reviewed and updated as appropriate: allergies, current medications, past family history, past medical history, past social history, past surgical history and problem list     Review of Systems   Constitutional: Negative  HENT: Positive for postnasal drip, rhinorrhea and sneezing  Eyes: Negative  Respiratory: Negative  Negative for cough and shortness of breath  Cardiovascular: Negative  Negative for chest pain, palpitations and leg swelling  Gastrointestinal: Negative  Negative for abdominal pain, constipation and diarrhea  Endocrine: Positive for heat intolerance  Negative for cold intolerance, polydipsia, polyphagia and polyuria  Patient confirms that she does experience hot flashes  Patient states she is aware that she is perimenopause and this was discussed with her gyn  Genitourinary: Negative  Musculoskeletal: Positive for arthralgias and myalgias  Negative for joint swelling  Skin:          Patient also asking about the small skin tags around her neck  Patient requesting to find out if they can be removed  Allergic/Immunologic: Positive for environmental allergies  Negative for food allergies  Neurological: Negative  Hematological: Negative  Psychiatric/Behavioral:        As noted in HPI patient is currently under care for mental health for chronic anxiety and depression  Objective:      /62 (BP Location: Right arm, Patient Position: Sitting, Cuff Size: Adult)   Pulse 80   Temp 98 3 °F (36 8 °C) (Oral)   Ht 5' (1 524 m)   Wt 62 8 kg (138 lb 7 2 oz)   LMP  (LMP Unknown)   BMI 27 04 kg/m²          Physical Exam   Constitutional: She is oriented to person, place, and time  She appears well-developed and well-nourished  HENT:   Head: Normocephalic and atraumatic  Mouth/Throat: Oropharynx is clear and moist  Abnormal dentition  No dental abscesses or dental caries  Eyes: Conjunctivae are normal  Pupils are equal, round, and reactive to light  Neck: Normal range of motion  Neck supple  Cardiovascular: Normal rate, regular rhythm and normal heart sounds  Exam reveals no friction rub  No murmur heard  Pulmonary/Chest: Effort normal and breath sounds normal  She has no wheezes  She has no rales  Abdominal: Soft  Bowel sounds are normal    Patient has a large ventral scar from umbilicus to pelvis, is thick but no hernia appreciated  Two smaller R lateral scars s/p GB surgery   Musculoskeletal: Normal range of motion  Patient does have some mild diffuse tenderness to palpation of trapezius hips thoracic and lumbar  Physical exam negative for synovitis  No swollen increased temperature or erythematous joints     Neurological: She is alert and oriented to person, place, and time  Skin: Skin is warm and dry  Right greater than left lateral neck with very small skin tags  Patient does have some hyperpigmentation around the nape of her neck however this is not acanthosis nigricans   Psychiatric: She has a normal mood and affect   Her behavior is normal

## 2018-06-12 NOTE — PATIENT INSTRUCTIONS
Please get the additional labs completed  Call and schedule your mammogram  I have refilled your Vit D please take daily  Remember no calcium, avoid caffeine with history of kidney stone  Continue follow-up with mental health as scheduled  Please schedule a follow-up visit here in 3-4 weeks to review your testing  You have agreed to do the FIT test, aware we will call you with the results   If normal repeat in 1 year, if abnormal we will get your scheduled for colonoscopy

## 2018-06-14 ENCOUNTER — TELEPHONE (OUTPATIENT)
Dept: INTERNAL MEDICINE CLINIC | Facility: CLINIC | Age: 53
End: 2018-06-14

## 2018-06-14 DIAGNOSIS — L30.9 DERMATITIS: Primary | ICD-10-CM

## 2018-06-14 RX ORDER — DIAPER,BRIEF,INFANT-TODD,DISP
EACH MISCELLANEOUS 2 TIMES DAILY
Qty: 30 G | Refills: 0 | Status: SHIPPED | OUTPATIENT
Start: 2018-06-14 | End: 2019-10-29

## 2018-06-14 NOTE — TELEPHONE ENCOUNTER
Patient said the she came for appointment on Tuesday and she said the she spoke to you about some warts the she have on her neck that make her itchy and she said the you prescribe her a lotion or cream for the itchiness she went to the pharmacy and they don't received any script for that  please check into this         Thank you

## 2018-07-10 ENCOUNTER — APPOINTMENT (OUTPATIENT)
Dept: LAB | Facility: CLINIC | Age: 53
End: 2018-07-10
Payer: COMMERCIAL

## 2018-07-10 PROCEDURE — 86618 LYME DISEASE ANTIBODY: CPT | Performed by: PHYSICIAN ASSISTANT

## 2018-07-10 PROCEDURE — 86038 ANTINUCLEAR ANTIBODIES: CPT | Performed by: PHYSICIAN ASSISTANT

## 2018-07-10 PROCEDURE — 36415 COLL VENOUS BLD VENIPUNCTURE: CPT | Performed by: PHYSICIAN ASSISTANT

## 2018-07-10 PROCEDURE — 86200 CCP ANTIBODY: CPT | Performed by: PHYSICIAN ASSISTANT

## 2018-07-10 PROCEDURE — 86430 RHEUMATOID FACTOR TEST QUAL: CPT | Performed by: PHYSICIAN ASSISTANT

## 2018-07-11 LAB
B BURGDOR IGG SER IA-ACNC: 0.16
B BURGDOR IGM SER IA-ACNC: 0.2
RHEUMATOID FACT SER QL LA: NEGATIVE

## 2018-07-12 LAB — CCP IGA+IGG SERPL IA-ACNC: 5 UNITS (ref 0–19)

## 2018-07-13 LAB — RYE IGE QN: NEGATIVE

## 2018-09-06 ENCOUNTER — OFFICE VISIT (OUTPATIENT)
Dept: INTERNAL MEDICINE CLINIC | Facility: CLINIC | Age: 53
End: 2018-09-06
Payer: COMMERCIAL

## 2018-09-06 VITALS
WEIGHT: 139.33 LBS | BODY MASS INDEX: 27.35 KG/M2 | HEIGHT: 60 IN | SYSTOLIC BLOOD PRESSURE: 94 MMHG | TEMPERATURE: 98.3 F | HEART RATE: 68 BPM | DIASTOLIC BLOOD PRESSURE: 66 MMHG

## 2018-09-06 DIAGNOSIS — F41.8 DEPRESSION WITH ANXIETY: ICD-10-CM

## 2018-09-06 DIAGNOSIS — G89.4 CHRONIC PAIN DISORDER: Primary | ICD-10-CM

## 2018-09-06 PROCEDURE — 99213 OFFICE O/P EST LOW 20 MIN: CPT | Performed by: PHYSICIAN ASSISTANT

## 2018-09-06 PROCEDURE — 3008F BODY MASS INDEX DOCD: CPT | Performed by: PHYSICIAN ASSISTANT

## 2018-09-06 NOTE — PATIENT INSTRUCTIONS
Discuss with your pain management doctor and psych for med for anxiety before MRI and if still not able they may be able to sched you for an open MRI  Sign release for records from pain management  Please sched your mammogram and complete the FIT test

## 2018-09-06 NOTE — PROGRESS NOTES
Assessment/Plan:    No problem-specific Assessment & Plan notes found for this encounter  There are no diagnoses linked to this encounter  Subjective:      Patient ID: Javier Heath is a 48 y o  female  Pt here regarding her pain  Has been going to pain management but not know where or who seeing  States was told needs MRI but patient states has Panic attack and could not get done  ?? History as patient states was told to get DEXA instead of MRI but discussed this is not same information  DEXA is for osteoporosis and not for pain  Was offered pills to help but states did not try them    Patient advised needs to sign release for consul notes, called daughter and going to [facility]  Told no meds until get the test by pain management    Prior to going and Pain Management I had completed autoimmune testing that was negative as well as x-rays that showed minimal levoscoliosis of 1 level with no significant degeneration  Discussion with patient it is more likely that she has fibromyalgia however her pain management doctor states that they want the MRI as she continues to have significant back pain that is specific and not just generalized muscle pains although she does report these at times as well    Also aware still needs to get mammo and complete the FIT test     States is under [place] [id number] care for her panic attacks  Patient does have medication from mental health and states she is taking it  Patient states she has a long history of panic attacks  The following portions of the patient's history were reviewed and updated as appropriate: allergies, current medications, past family history, past medical history, past social history, past surgical history and problem list     Review of Systems   Constitutional: Negative  Respiratory: Negative  Negative for shortness of breath  Cardiovascular: Negative  Negative for chest pain     Musculoskeletal: Positive for arthralgias, back pain and myalgias  Neurological: Negative for numbness  Psychiatric/Behavioral: Negative for confusion  The patient is nervous/anxious  Objective:      BP 94/66 (BP Location: Left arm, Patient Position: Sitting, Cuff Size: Standard)   Pulse 68   Temp 98 3 °F (36 8 °C) (Oral)   Ht 5' (1 524 m)   Wt 63 2 kg (139 lb 5 3 oz)   LMP  (LMP Unknown)   BMI 27 21 kg/m²          Physical Exam   Constitutional: She is oriented to person, place, and time  She appears well-developed and well-nourished  Cardiovascular: Normal rate and regular rhythm  Pulmonary/Chest: Effort normal and breath sounds normal    Abdominal: Soft  Bowel sounds are normal    Neurological: She is alert and oriented to person, place, and time  Skin: Skin is warm  Psychiatric: She has a normal mood and affect   Her behavior is normal

## 2018-11-28 ENCOUNTER — TELEPHONE (OUTPATIENT)
Dept: OBGYN CLINIC | Facility: HOSPITAL | Age: 53
End: 2018-11-28

## 2018-11-28 NOTE — TELEPHONE ENCOUNTER
Left voicemail in Serbian explaining:  Please call [facility]'s central scheduling at [phone] to schedule your mammogram  The order is already placed in EPIC, your medical record  Any questions feel free to use Pik[employer] or call [phone], [facility] access hospital

## 2018-12-12 ENCOUNTER — OFFICE VISIT (OUTPATIENT)
Dept: INTERNAL MEDICINE CLINIC | Facility: CLINIC | Age: 53
End: 2018-12-12
Payer: COMMERCIAL

## 2018-12-12 VITALS
HEART RATE: 84 BPM | DIASTOLIC BLOOD PRESSURE: 70 MMHG | HEIGHT: 60 IN | BODY MASS INDEX: 28.31 KG/M2 | WEIGHT: 144.18 LBS | SYSTOLIC BLOOD PRESSURE: 100 MMHG | TEMPERATURE: 97.8 F

## 2018-12-12 DIAGNOSIS — G89.4 CHRONIC PAIN DISORDER: ICD-10-CM

## 2018-12-12 DIAGNOSIS — F41.8 DEPRESSION WITH ANXIETY: ICD-10-CM

## 2018-12-12 DIAGNOSIS — G89.29 CHRONIC BILATERAL LOW BACK PAIN WITHOUT SCIATICA: Primary | ICD-10-CM

## 2018-12-12 DIAGNOSIS — M25.50 ARTHRALGIA, UNSPECIFIED JOINT: ICD-10-CM

## 2018-12-12 DIAGNOSIS — M54.50 CHRONIC BILATERAL LOW BACK PAIN WITHOUT SCIATICA: Primary | ICD-10-CM

## 2018-12-12 PROCEDURE — 99213 OFFICE O/P EST LOW 20 MIN: CPT | Performed by: PHYSICIAN ASSISTANT

## 2018-12-12 PROCEDURE — 3008F BODY MASS INDEX DOCD: CPT | Performed by: PHYSICIAN ASSISTANT

## 2018-12-12 RX ORDER — GABAPENTIN 300 MG/1
300 CAPSULE ORAL 3 TIMES DAILY
Qty: 90 CAPSULE | Refills: 0 | Status: SHIPPED | OUTPATIENT
Start: 2018-12-12 | End: 2020-07-10

## 2018-12-12 NOTE — PATIENT INSTRUCTIONS
As per discussion you would like to be seen by Florentino Moya and Pain Management  You continue to report ongoing chronic low back pain that is limiting activities of daily living  You also report very separate pain that is more generalized and widespread however you also endorse that you have very specific pain and sometimes swelling to joints in her hands  For this we are referring you to the rheumatologist as previous testing was negative but you still need proper evaluation  I have also ordered a blood test to determine if there is widespread inflammation causing pain please get this lab completed and if positive we will call with results  Continue with mental health providers as scheduled  Mammogram is scheduled  You were provided with the fit test back in June this still needs to be completed

## 2018-12-12 NOTE — PROGRESS NOTES
Assessment/Plan:    No problem-specific Assessment & Plan notes found for this encounter  Diagnoses and all orders for this visit:    Chronic bilateral low back pain without sciatica  -     Ambulatory referral to Pain Management; Future  -     gabapentin (NEURONTIN) 300 mg capsule; Take 1 capsule (300 mg total) by mouth 3 (three) times a day for 90 days    Arthralgia, unspecified joint  -     Ambulatory referral to Rheumatology; Future  -     Sedimentation rate, automated; Future  -     C-reactive protein; Future    Depression with anxiety    Chronic pain disorder          Subjective:      Patient ID: Sara Link is a 48 y o  female  Pt here for episodic regarding back pain, last visit 9/2018  Pt states she went to pain management 3-4 times at [facility]  Pt states the physical therapy is not helping  Pt states she saw pain management 3 weeks ago  Pt states she has tried ibuprofen and Tylenol for diffuse joint and back pain without relief  Pt states she is compliant with mental health meds, following with provider 2 times monthly for therapy and once monthly for psychiatrist   Discussed with patient that we can refer to [facility] pain management for treatment  Pt agreeable at this time  Did have same discussion with patient in the past when she was still seeing her pain management doctor and that discussed more likely a myofascial pain syndrome/fibromyalgia however patient stated her pain management doctor wanted an MRI 1st   As noted the MRI was denied by her insurance  Patient actually has separate pain  Patient does specifically have pain in her low back that is reproducible on range of motion  Patient also continues to report widespread joint pain and at times reports that her fingers and hands to get swollen  As noted patient's autoimmune testing was all negative however does not rule out seronegative rheumatoid arthritis      Pt aware of mammogram due, has appt scheduled  FIT test due, pt aware  Discussed with patient that we would provide her with the appropriate referrals to address her chronic pain issues as well as screen her for any inflammation as there is still a possibility of seronegative rheumatoid arthritis given her very specific complaints of pain in her hands and that sometimes there is swelling  The following portions of the patient's history were reviewed and updated as appropriate: allergies, current medications, past family history, past medical history, past social history and past surgical history  Review of Systems   Constitutional: Negative  Negative for fever and unexpected weight change  Respiratory: Negative  Negative for cough and shortness of breath  Cardiovascular: Negative  Negative for chest pain and palpitations  Gastrointestinal: Negative  Negative for abdominal pain, constipation and diarrhea  Musculoskeletal: Positive for arthralgias, back pain, joint swelling, myalgias and neck pain  Negative for gait problem and neck stiffness  Skin: Negative for rash  Neurological: Negative for tremors, syncope, facial asymmetry, numbness and headaches  Psychiatric/Behavioral: Positive for sleep disturbance  The patient is nervous/anxious  Objective:      /70 (BP Location: Right arm, Patient Position: Sitting, Cuff Size: Standard)   Pulse 84   Temp 97 8 °F (36 6 °C) (Oral)   Ht 5' (1 524 m)   Wt 65 4 kg (144 lb 2 9 oz)   LMP  (LMP Unknown)   BMI 28 16 kg/m²          Physical Exam   Constitutional: She is oriented to person, place, and time  She appears well-developed and well-nourished  No distress  HENT:   Head: Normocephalic and atraumatic  Cardiovascular: Normal rate, regular rhythm, normal heart sounds and intact distal pulses  Exam reveals no gallop and no friction rub  No murmur heard  Pulmonary/Chest: Effort normal and breath sounds normal  No respiratory distress  She has no wheezes  She has no rales  Abdominal: There is no tenderness  Musculoskeletal: She exhibits edema  Lumbar back: She exhibits decreased range of motion and tenderness  She exhibits no bony tenderness and no deformity  Patient does have some osteoarthritis in her hands however negative for synovitis testing today  Today patient does not have any swelling or erythema of her joints  Neurological: She is alert and oriented to person, place, and time  Skin: Skin is warm  She is not diaphoretic  Psychiatric: She has a normal mood and affect   Her behavior is normal

## 2018-12-20 ENCOUNTER — APPOINTMENT (OUTPATIENT)
Dept: LAB | Facility: HOSPITAL | Age: 53
End: 2018-12-20
Payer: COMMERCIAL

## 2018-12-20 DIAGNOSIS — Z12.11 SCREENING FOR COLON CANCER: ICD-10-CM

## 2018-12-20 LAB — HEMOCCULT STL QL IA: NEGATIVE

## 2018-12-20 PROCEDURE — G0328 FECAL BLOOD SCRN IMMUNOASSAY: HCPCS

## 2018-12-26 ENCOUNTER — TELEPHONE (OUTPATIENT)
Dept: INTERNAL MEDICINE CLINIC | Facility: CLINIC | Age: 53
End: 2018-12-26

## 2018-12-26 ENCOUNTER — APPOINTMENT (OUTPATIENT)
Dept: LAB | Facility: CLINIC | Age: 53
End: 2018-12-26
Payer: COMMERCIAL

## 2018-12-26 DIAGNOSIS — M25.50 ARTHRALGIA, UNSPECIFIED JOINT: ICD-10-CM

## 2018-12-26 LAB
CRP SERPL QL: <3 MG/L
ERYTHROCYTE [SEDIMENTATION RATE] IN BLOOD: 19 MM/HOUR (ref 0–20)

## 2018-12-26 PROCEDURE — 36415 COLL VENOUS BLD VENIPUNCTURE: CPT

## 2018-12-26 PROCEDURE — 86140 C-REACTIVE PROTEIN: CPT

## 2018-12-26 PROCEDURE — 85652 RBC SED RATE AUTOMATED: CPT

## 2018-12-28 NOTE — TELEPHONE ENCOUNTER
Spoke with patient and made her aware , patient states that her rheumatologist advised her that her pcp was to order cymbalta for fibromyalgia ? I was a bit confused and I told patient the rheumatologist are the ones whom specialize and medicate that diagnoses and patient insisted that the specialist told her it needs to come from pcp  Patient sees a rheumatologist out of [facility]  Please inform me

## 2019-01-03 ENCOUNTER — TELEPHONE (OUTPATIENT)
Dept: OBGYN CLINIC | Facility: CLINIC | Age: 54
End: 2019-01-03

## 2019-01-03 ENCOUNTER — HOSPITAL ENCOUNTER (OUTPATIENT)
Dept: RADIOLOGY | Facility: HOSPITAL | Age: 54
Discharge: HOME | End: 2019-01-03
Payer: COMMERCIAL

## 2019-01-03 DIAGNOSIS — Z12.39 BREAST CANCER SCREENING: ICD-10-CM

## 2019-01-03 PROCEDURE — 77067 SCR MAMMO BI INCL CAD: CPT

## 2019-01-03 NOTE — TELEPHONE ENCOUNTER
Rec'd call from [facility]  Pt has reported to their office today for her mammogram, but pt was scheduled for 3D mammo today at [facility]  Per Rao Yip, they cannot do a 3D mammogram at the [facility]  I d/w Kimmie TADEO- OK to change order to just Screening Mammo bilat with CAD  Relayed to Rao Yip, pt to get mammo done today in [facility]

## 2019-01-03 NOTE — TELEPHONE ENCOUNTER
Called patient and advised her  She will wait to hear from her psychiatrist first  Also, reminded patient to make appointment with pain management for chronic back pain  Patient has no other questions at this time

## 2019-01-03 NOTE — TELEPHONE ENCOUNTER
Called and spoke to Greg Pagan    Patient stated she will call her psychiatrist and whatever they tell her she will call us back  She also wanted to know if there is any other medication if not the Cymbalta that can help her with the pain?

## 2019-01-18 DIAGNOSIS — E55.9 VITAMIN D DEFICIENCY: ICD-10-CM

## 2019-01-18 RX ORDER — ACETAMINOPHEN 160 MG
2000 TABLET,DISINTEGRATING ORAL DAILY
Qty: 90 CAPSULE | Refills: 1 | Status: SHIPPED | OUTPATIENT
Start: 2019-01-18 | End: 2019-07-29 | Stop reason: SDUPTHER

## 2019-07-17 ENCOUNTER — APPOINTMENT (EMERGENCY)
Dept: RADIOLOGY | Facility: HOSPITAL | Age: 54
End: 2019-07-17
Payer: COMMERCIAL

## 2019-07-17 ENCOUNTER — HOSPITAL ENCOUNTER (EMERGENCY)
Facility: HOSPITAL | Age: 54
Discharge: HOME | End: 2019-07-17
Attending: EMERGENCY MEDICINE | Admitting: EMERGENCY MEDICINE
Payer: COMMERCIAL

## 2019-07-17 VITALS
HEART RATE: 52 BPM | WEIGHT: 134 LBS | TEMPERATURE: 98.2 F | DIASTOLIC BLOOD PRESSURE: 62 MMHG | OXYGEN SATURATION: 100 % | SYSTOLIC BLOOD PRESSURE: 106 MMHG | BODY MASS INDEX: 26.17 KG/M2 | RESPIRATION RATE: 20 BRPM

## 2019-07-17 DIAGNOSIS — R51.9 HEADACHE: Primary | ICD-10-CM

## 2019-07-17 LAB
ANION GAP SERPL CALCULATED.3IONS-SCNC: 1 MMOL/L (ref 4–13)
ATRIAL RATE: 89 BPM
BASOPHILS # BLD AUTO: 0 THOUSANDS/ΜL (ref 0–0.1)
BASOPHILS NFR BLD AUTO: 0 % (ref 0–1)
BUN SERPL-MCNC: 14 MG/DL (ref 5–25)
CALCIUM SERPL-MCNC: 9.1 MG/DL (ref 8.3–10.1)
CHLORIDE SERPL-SCNC: 109 MMOL/L (ref 100–108)
CO2 SERPL-SCNC: 30 MMOL/L (ref 21–32)
CREAT SERPL-MCNC: 0.62 MG/DL (ref 0.6–1.3)
CRP SERPL QL: 8.9 MG/L
DEPRECATED D DIMER PPP: <220 NG/ML (FEU)
EOSINOPHIL # BLD AUTO: 0.06 THOUSAND/ΜL (ref 0–0.61)
EOSINOPHIL NFR BLD AUTO: 2 % (ref 0–6)
ERYTHROCYTE [DISTWIDTH] IN BLOOD BY AUTOMATED COUNT: 12.8 % (ref 11.6–15.1)
ERYTHROCYTE [SEDIMENTATION RATE] IN BLOOD: 25 MM/HOUR (ref 0–20)
GFR SERPL CREATININE-BSD FRML MDRD: 120 ML/MIN/1.73SQ M
GLUCOSE SERPL-MCNC: 95 MG/DL (ref 65–140)
HCT VFR BLD AUTO: 38.3 % (ref 34.8–46.1)
HGB BLD-MCNC: 12.9 G/DL (ref 11.5–15.4)
IMM GRANULOCYTES # BLD AUTO: 0.02 THOUSAND/UL (ref 0–0.2)
IMM GRANULOCYTES NFR BLD AUTO: 1 % (ref 0–2)
LYMPHOCYTES # BLD AUTO: 1.72 THOUSANDS/ΜL (ref 0.6–4.47)
LYMPHOCYTES NFR BLD AUTO: 43 % (ref 14–44)
MCH RBC QN AUTO: 28.7 PG (ref 26.8–34.3)
MCHC RBC AUTO-ENTMCNC: 33.7 G/DL (ref 31.4–37.4)
MCV RBC AUTO: 85 FL (ref 82–98)
MONOCYTES # BLD AUTO: 0.4 THOUSAND/ΜL (ref 0.17–1.22)
MONOCYTES NFR BLD AUTO: 10 % (ref 4–12)
NEUTROPHILS # BLD AUTO: 1.83 THOUSANDS/ΜL (ref 1.85–7.62)
NEUTS SEG NFR BLD AUTO: 44 % (ref 43–75)
NRBC BLD AUTO-RTO: 0 /100 WBCS
P AXIS: 66 DEGREES
PLATELET # BLD AUTO: 151 THOUSANDS/UL (ref 149–390)
PMV BLD AUTO: 9.4 FL (ref 8.9–12.7)
POTASSIUM SERPL-SCNC: 3.7 MMOL/L (ref 3.5–5.3)
PR INTERVAL: 136 MS
QRS AXIS: 57 DEGREES
QRSD INTERVAL: 64 MS
QT INTERVAL: 348 MS
QTC INTERVAL: 423 MS
RBC # BLD AUTO: 4.49 MILLION/UL (ref 3.81–5.12)
SODIUM SERPL-SCNC: 140 MMOL/L (ref 136–145)
T WAVE AXIS: 34 DEGREES
TROPONIN I SERPL-MCNC: <0.02 NG/ML
VENTRICULAR RATE: 89 BPM
WBC # BLD AUTO: 4.03 THOUSAND/UL (ref 4.31–10.16)

## 2019-07-17 PROCEDURE — 85025 COMPLETE CBC W/AUTO DIFF WBC: CPT | Performed by: EMERGENCY MEDICINE

## 2019-07-17 PROCEDURE — 99284 EMERGENCY DEPT VISIT MOD MDM: CPT

## 2019-07-17 PROCEDURE — 93010 ELECTROCARDIOGRAM REPORT: CPT | Performed by: INTERNAL MEDICINE

## 2019-07-17 PROCEDURE — 85652 RBC SED RATE AUTOMATED: CPT | Performed by: EMERGENCY MEDICINE

## 2019-07-17 PROCEDURE — 86140 C-REACTIVE PROTEIN: CPT | Performed by: EMERGENCY MEDICINE

## 2019-07-17 PROCEDURE — 71045 X-RAY EXAM CHEST 1 VIEW: CPT

## 2019-07-17 PROCEDURE — 70496 CT ANGIOGRAPHY HEAD: CPT

## 2019-07-17 PROCEDURE — 99284 EMERGENCY DEPT VISIT MOD MDM: CPT | Performed by: EMERGENCY MEDICINE

## 2019-07-17 PROCEDURE — 93005 ELECTROCARDIOGRAM TRACING: CPT

## 2019-07-17 PROCEDURE — 36415 COLL VENOUS BLD VENIPUNCTURE: CPT | Performed by: EMERGENCY MEDICINE

## 2019-07-17 PROCEDURE — 84484 ASSAY OF TROPONIN QUANT: CPT | Performed by: EMERGENCY MEDICINE

## 2019-07-17 PROCEDURE — 85379 FIBRIN DEGRADATION QUANT: CPT | Performed by: EMERGENCY MEDICINE

## 2019-07-17 PROCEDURE — 80048 BASIC METABOLIC PNL TOTAL CA: CPT | Performed by: EMERGENCY MEDICINE

## 2019-07-17 PROCEDURE — 70498 CT ANGIOGRAPHY NECK: CPT

## 2019-07-17 RX ORDER — ACETAMINOPHEN 325 MG/1
975 TABLET ORAL ONCE
Status: COMPLETED | OUTPATIENT
Start: 2019-07-17 | End: 2019-07-17

## 2019-07-17 RX ORDER — ACETAMINOPHEN 500 MG
500 TABLET ORAL EVERY 6 HOURS PRN
Qty: 40 TABLET | Refills: 0 | Status: SHIPPED | OUTPATIENT
Start: 2019-07-17 | End: 2019-07-27

## 2019-07-17 RX ADMIN — ACETAMINOPHEN 975 MG: 325 TABLET ORAL at 14:56

## 2019-07-17 RX ADMIN — IOHEXOL 85 ML: 350 INJECTION, SOLUTION INTRAVENOUS at 15:56
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
## 2019-07-17 NOTE — ED ATTENDING ATTESTATION
Russell Terrazas MD, saw and evaluated the patient  I have discussed the patient with the resident/non-physician practitioner and agree with the resident's/non-physician practitioner's findings, Plan of Care, and MDM as documented in the resident's/non-physician practitioner's note, except where noted  All available labs and Radiology studies were reviewed  I was present for key portions of any procedure(s) performed by the resident/non-physician practitioner and I was immediately available to provide assistance  At this point I agree with the current assessment done in the Emergency Department  I have conducted an independent evaluation of this patient a history and physical is as follows:  42-year-old female presenting to the emergency depart with several complaints  The patient has a headache, which he states is occipital, and had some blurry vision which has resolved  She states she had a headache like this related to a pregnancy, and states that she lost her vision for 3 months  Additionally, the patient complains of bilateral leg weakness  She also has been complaining of some dyspnea as well  Patient has not had fevers or chills  She is not vomiting  She does not have diarrhea  Patient has a difficult time articulating whether not she has had a headache like this before  It was not sudden onset  It was present 2 days ago, went away, and then came back  She describes it as being tight  Patient denies any difficulty with her vision currently  She does not report any visual field cuts  She denies any numbness, tingling, or weakness  The weakness in her legs is global, bilateral, and also intermittent  No fevers or chills  Review of systems negative in 12 systems reviewed  On exam Vital signs were reviewed  Patient is awake, alert, interactive  The patient's pupils are equally round reactive to light  Oropharynx is clear with moist mucous membranes    Neck is supple and nontender with no adenopathy or JVD  Heart is regular with no murmurs, rubs, or gallops  Lungs are clear and equal with no wheezes, rales, or rhonchi  Abdomen is soft and nontender with no masses, rebound, or guarding  There is no CVA tenderness  The patient was completely exposed  There is no skin breakdown  There are no rashes or skin changes  Extremities are warm and well perfused with good pulses  The patient has normal strength, sensation, and cranial nerves  Impression:  Likely primary headache disorder, although concern with visual changes    Will plan to do CT, CTA, agree with documentation    Critical Care Time  Procedures

## 2019-07-17 NOTE — DISCHARGE INSTRUCTIONS
Please follow up with your PCP for further evaluation of your headache  Return to ED if symptoms worsen including worsening headache, headache that awakens you from sleep and change in vision

## 2019-07-18 LAB
ATRIAL RATE: 101 BPM
P AXIS: 42 DEGREES
PR INTERVAL: 158 MS
QRS AXIS: 64 DEGREES
QRSD INTERVAL: 98 MS
QT INTERVAL: 354 MS
QTC INTERVAL: 459 MS
T WAVE AXIS: 13 DEGREES
VENTRICULAR RATE: 101 BPM

## 2019-07-18 PROCEDURE — 93010 ELECTROCARDIOGRAM REPORT: CPT | Performed by: INTERNAL MEDICINE

## 2019-07-19 ENCOUNTER — OFFICE VISIT (OUTPATIENT)
Dept: INTERNAL MEDICINE CLINIC | Facility: CLINIC | Age: 54
End: 2019-07-19

## 2019-07-19 VITALS
HEART RATE: 68 BPM | TEMPERATURE: 97.9 F | DIASTOLIC BLOOD PRESSURE: 70 MMHG | WEIGHT: 142.42 LBS | SYSTOLIC BLOOD PRESSURE: 110 MMHG | BODY MASS INDEX: 27.96 KG/M2 | HEIGHT: 60 IN

## 2019-07-19 DIAGNOSIS — G44.201 INTRACTABLE TENSION-TYPE HEADACHE, UNSPECIFIED CHRONICITY PATTERN: ICD-10-CM

## 2019-07-19 DIAGNOSIS — M79.7 FIBROMYALGIA: Primary | ICD-10-CM

## 2019-07-19 DIAGNOSIS — G89.4 CHRONIC PAIN DISORDER: ICD-10-CM

## 2019-07-19 PROCEDURE — 99213 OFFICE O/P EST LOW 20 MIN: CPT | Performed by: HOSPITALIST

## 2019-07-19 RX ORDER — DULOXETIN HYDROCHLORIDE 30 MG/1
30 CAPSULE, DELAYED RELEASE ORAL DAILY
Qty: 90 CAPSULE | Refills: 1 | Status: SHIPPED | OUTPATIENT
Start: 2019-07-19 | End: 2020-01-19
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
## 2019-07-29 DIAGNOSIS — E55.9 VITAMIN D DEFICIENCY: ICD-10-CM

## 2019-07-29 RX ORDER — GLUCOSAMINE/CHONDR SU A SOD 750-600 MG
TABLET ORAL
Qty: 90 CAPSULE | Refills: 1 | Status: SHIPPED | OUTPATIENT
Start: 2019-07-29 | End: 2020-01-22 | Stop reason: SDUPTHER

## 2019-07-30 ENCOUNTER — APPOINTMENT (OUTPATIENT)
Dept: LAB | Facility: CLINIC | Age: 54
End: 2019-07-30
Payer: COMMERCIAL

## 2019-07-30 DIAGNOSIS — Z79.899 ENCOUNTER FOR LONG-TERM (CURRENT) USE OF OTHER MEDICATIONS: Primary | ICD-10-CM

## 2019-07-30 LAB
25(OH)D3 SERPL-MCNC: 41.6 NG/ML (ref 30–100)
ALBUMIN SERPL BCP-MCNC: 3.7 G/DL (ref 3.5–5)
ALP SERPL-CCNC: 78 U/L (ref 46–116)
ALT SERPL W P-5'-P-CCNC: 28 U/L (ref 12–78)
ANION GAP SERPL CALCULATED.3IONS-SCNC: 3 MMOL/L (ref 4–13)
AST SERPL W P-5'-P-CCNC: 13 U/L (ref 5–45)
BASOPHILS # BLD AUTO: 0 THOUSANDS/ΜL (ref 0–0.1)
BASOPHILS NFR BLD AUTO: 0 % (ref 0–1)
BILIRUB SERPL-MCNC: 0.3 MG/DL (ref 0.2–1)
BUN SERPL-MCNC: 14 MG/DL (ref 5–25)
CALCIUM SERPL-MCNC: 9.1 MG/DL (ref 8.3–10.1)
CHLORIDE SERPL-SCNC: 114 MMOL/L (ref 100–108)
CHOLEST SERPL-MCNC: 149 MG/DL (ref 50–200)
CO2 SERPL-SCNC: 29 MMOL/L (ref 21–32)
CREAT SERPL-MCNC: 0.59 MG/DL (ref 0.6–1.3)
EOSINOPHIL # BLD AUTO: 0.04 THOUSAND/ΜL (ref 0–0.61)
EOSINOPHIL NFR BLD AUTO: 1 % (ref 0–6)
ERYTHROCYTE [DISTWIDTH] IN BLOOD BY AUTOMATED COUNT: 13.2 % (ref 11.6–15.1)
EST. AVERAGE GLUCOSE BLD GHB EST-MCNC: 97 MG/DL
GFR SERPL CREATININE-BSD FRML MDRD: 122 ML/MIN/1.73SQ M
GLUCOSE P FAST SERPL-MCNC: 93 MG/DL (ref 65–99)
HBA1C MFR BLD: 5 % (ref 4.2–6.3)
HCT VFR BLD AUTO: 38.4 % (ref 34.8–46.1)
HDLC SERPL-MCNC: 42 MG/DL (ref 40–60)
HGB BLD-MCNC: 12.8 G/DL (ref 11.5–15.4)
IMM GRANULOCYTES # BLD AUTO: 0.01 THOUSAND/UL (ref 0–0.2)
IMM GRANULOCYTES NFR BLD AUTO: 0 % (ref 0–2)
LDLC SERPL CALC-MCNC: 81 MG/DL (ref 0–100)
LYMPHOCYTES # BLD AUTO: 1.69 THOUSANDS/ΜL (ref 0.6–4.47)
LYMPHOCYTES NFR BLD AUTO: 40 % (ref 14–44)
MCH RBC QN AUTO: 29.4 PG (ref 26.8–34.3)
MCHC RBC AUTO-ENTMCNC: 33.3 G/DL (ref 31.4–37.4)
MCV RBC AUTO: 88 FL (ref 82–98)
MONOCYTES # BLD AUTO: 0.21 THOUSAND/ΜL (ref 0.17–1.22)
MONOCYTES NFR BLD AUTO: 5 % (ref 4–12)
NEUTROPHILS # BLD AUTO: 2.26 THOUSANDS/ΜL (ref 1.85–7.62)
NEUTS SEG NFR BLD AUTO: 54 % (ref 43–75)
NONHDLC SERPL-MCNC: 107 MG/DL
NRBC BLD AUTO-RTO: 0 /100 WBCS
PLATELET # BLD AUTO: 178 THOUSANDS/UL (ref 149–390)
PMV BLD AUTO: 10.3 FL (ref 8.9–12.7)
POTASSIUM SERPL-SCNC: 4.4 MMOL/L (ref 3.5–5.3)
PROLACTIN SERPL-MCNC: 19.6 NG/ML
PROT SERPL-MCNC: 7 G/DL (ref 6.4–8.2)
RBC # BLD AUTO: 4.36 MILLION/UL (ref 3.81–5.12)
SODIUM SERPL-SCNC: 146 MMOL/L (ref 136–145)
TRIGL SERPL-MCNC: 130 MG/DL
TSH SERPL DL<=0.05 MIU/L-ACNC: 1.71 UIU/ML (ref 0.36–3.74)
WBC # BLD AUTO: 4.21 THOUSAND/UL (ref 4.31–10.16)

## 2019-07-30 PROCEDURE — 80061 LIPID PANEL: CPT

## 2019-07-30 PROCEDURE — 36415 COLL VENOUS BLD VENIPUNCTURE: CPT

## 2019-07-30 PROCEDURE — 82306 VITAMIN D 25 HYDROXY: CPT

## 2019-07-30 PROCEDURE — 84146 ASSAY OF PROLACTIN: CPT

## 2019-07-30 PROCEDURE — 85025 COMPLETE CBC W/AUTO DIFF WBC: CPT

## 2019-07-30 PROCEDURE — 84443 ASSAY THYROID STIM HORMONE: CPT

## 2019-07-30 PROCEDURE — 80053 COMPREHEN METABOLIC PANEL: CPT

## 2019-07-30 PROCEDURE — 83036 HEMOGLOBIN GLYCOSYLATED A1C: CPT

## 2019-08-23 ENCOUNTER — TELEPHONE (OUTPATIENT)
Dept: INTERNAL MEDICINE CLINIC | Facility: CLINIC | Age: 54
End: 2019-08-23

## 2019-08-26 NOTE — TELEPHONE ENCOUNTER
Letter is for housing stating patient was recommended to exercise to help improve her chronic back pain  This will help housing approve patient to have equipments at home

## 2019-09-17 ENCOUNTER — TELEPHONE (OUTPATIENT)
Dept: INTERNAL MEDICINE CLINIC | Facility: CLINIC | Age: 54
End: 2019-09-17

## 2019-10-29 ENCOUNTER — OFFICE VISIT (OUTPATIENT)
Dept: OBGYN CLINIC | Facility: CLINIC | Age: 54
End: 2019-10-29

## 2019-10-29 VITALS
SYSTOLIC BLOOD PRESSURE: 112 MMHG | HEIGHT: 60 IN | WEIGHT: 145 LBS | BODY MASS INDEX: 28.47 KG/M2 | HEART RATE: 78 BPM | DIASTOLIC BLOOD PRESSURE: 75 MMHG

## 2019-10-29 DIAGNOSIS — Z00.00 ANNUAL PHYSICAL EXAM: Primary | ICD-10-CM

## 2019-10-29 DIAGNOSIS — Z90.710 HISTORY OF TOTAL HYSTERECTOMY: ICD-10-CM

## 2019-10-29 PROCEDURE — 3725F SCREEN DEPRESSION PERFORMED: CPT | Performed by: OBSTETRICS & GYNECOLOGY

## 2019-10-29 PROCEDURE — 99386 PREV VISIT NEW AGE 40-64: CPT | Performed by: OBSTETRICS & GYNECOLOGY

## 2019-10-29 RX ORDER — PREGABALIN 50 MG/1
50 CAPSULE ORAL 3 TIMES DAILY
COMMUNITY
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
## 2019-11-01 ENCOUNTER — OFFICE VISIT (OUTPATIENT)
Dept: INTERNAL MEDICINE CLINIC | Facility: CLINIC | Age: 54
End: 2019-11-01

## 2019-11-01 VITALS
TEMPERATURE: 98.2 F | HEIGHT: 60 IN | DIASTOLIC BLOOD PRESSURE: 56 MMHG | BODY MASS INDEX: 28.83 KG/M2 | HEART RATE: 60 BPM | SYSTOLIC BLOOD PRESSURE: 102 MMHG | WEIGHT: 146.83 LBS

## 2019-11-01 DIAGNOSIS — Z12.11 SCREEN FOR COLON CANCER: ICD-10-CM

## 2019-11-01 DIAGNOSIS — G89.4 CHRONIC PAIN DISORDER: ICD-10-CM

## 2019-11-01 DIAGNOSIS — M79.7 FIBROMYALGIA: Primary | ICD-10-CM

## 2019-11-01 PROCEDURE — 1036F TOBACCO NON-USER: CPT | Performed by: HOSPITALIST

## 2019-11-01 PROCEDURE — 99213 OFFICE O/P EST LOW 20 MIN: CPT | Performed by: HOSPITALIST
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
## 2019-12-04 ENCOUNTER — HOSPITAL ENCOUNTER (EMERGENCY)
Facility: HOSPITAL | Age: 54
Discharge: HOME | End: 2019-12-04
Attending: EMERGENCY MEDICINE | Admitting: EMERGENCY MEDICINE
Payer: COMMERCIAL

## 2019-12-04 VITALS
SYSTOLIC BLOOD PRESSURE: 114 MMHG | RESPIRATION RATE: 18 BRPM | HEART RATE: 72 BPM | OXYGEN SATURATION: 100 % | WEIGHT: 145 LBS | TEMPERATURE: 97.4 F | DIASTOLIC BLOOD PRESSURE: 61 MMHG | BODY MASS INDEX: 28.32 KG/M2

## 2019-12-04 DIAGNOSIS — R51.9 HEADACHE: Primary | ICD-10-CM

## 2019-12-04 DIAGNOSIS — T88.7XXA MEDICATION SIDE EFFECT: ICD-10-CM

## 2019-12-04 PROCEDURE — 99283 EMERGENCY DEPT VISIT LOW MDM: CPT

## 2019-12-04 PROCEDURE — 96361 HYDRATE IV INFUSION ADD-ON: CPT

## 2019-12-04 PROCEDURE — 96360 HYDRATION IV INFUSION INIT: CPT

## 2019-12-04 PROCEDURE — 99283 EMERGENCY DEPT VISIT LOW MDM: CPT | Performed by: EMERGENCY MEDICINE

## 2019-12-04 RX ORDER — ASENAPINE 5 MG/1
5 TABLET SUBLINGUAL ONCE
Status: DISCONTINUED | OUTPATIENT
Start: 2019-12-04 | End: 2019-12-05 | Stop reason: HOSPADM

## 2019-12-04 RX ORDER — METOCLOPRAMIDE HYDROCHLORIDE 5 MG/ML
10 INJECTION INTRAMUSCULAR; INTRAVENOUS ONCE
Status: DISCONTINUED | OUTPATIENT
Start: 2019-12-04 | End: 2019-12-05 | Stop reason: HOSPADM

## 2019-12-04 RX ORDER — DIPHENHYDRAMINE HYDROCHLORIDE 50 MG/ML
25 INJECTION INTRAMUSCULAR; INTRAVENOUS ONCE
Status: DISCONTINUED | OUTPATIENT
Start: 2019-12-04 | End: 2019-12-05 | Stop reason: HOSPADM

## 2019-12-04 RX ADMIN — SODIUM CHLORIDE 1000 ML: 0.9 INJECTION, SOLUTION INTRAVENOUS at 20:44
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
## 2019-12-05 NOTE — ED NOTES
Pt stated, "please no medicine  I just want the fluids   Let's see if that works"     Don Miller, [facility]  12/04/19 2046

## 2019-12-05 NOTE — DISCHARGE INSTRUCTIONS
Take your other medications as prescribed  Call your doctor regarding the diclofenac and your possible adverse reaction to it  Return to the ER with sudden severe headache, vision changes, new weakness or numbness of an arm or leg, speech difficulty, facial numbness or weakness, severe neck pain, or any other concerning symptoms

## 2019-12-05 NOTE — ED ATTENDING ATTESTATION
Yohana Malave MD, saw and evaluated the patient  All available labs and X-rays were ordered by me or the resident and have been reviewed by myself  I discussed the patient with the resident / non-physician and agree with the resident's / non-physician practitioner's findings and plan as documented in the resident's / non-physician practicitioner's note, except where noted  At this point, I agree with the current assessment done in the ED  I was present during key portions of all procedures performed unless otherwise stated  Chief Complaint   Patient presents with    Medication Problem     pt reports "i have fibromyalgia and meds are bad for her", multiple symptoms after taking diclofenac, body swelling and trouble breathing intermittently     This is a 49 y/o F presenting for evaluation of multiple complaints  She has been feeling "not well" defining this as her fibromyalgia  She says that she saw her doctor who wrote for diclofenac 75mg BID/TID  The patient though thought that it was too strong  She couldn't really explain what this meant to me  She then had her dose changed to 50mg BID and it worked but she's not taking it regularly, taking it on Dec 2nd, 1 dose, and yeesterday x2 doses  She said something about feeling her lips swollen but it resolved? +headache all over, especially in her occiput  Similar to a previous headache that comes/goes by itself  Falls, trauma  Denies any urinary tract infection symptoms (burning, itching, pain, blood, frequency)  Denies any upper respiratory tract infection symptoms (cough, congestion, rhinorrhea, sore throat)  No falls, trauma  Denies focal weakness  Deneis throbbing quality  Denies neck pain  Says that she felt "unable to eat"  She denied nausea or vomiting, but can't eat? She denies change in belly pain  No numbness/tingling  No double vision  No loss of function   Headache is currently gone (examined after medications + fluids), she just feels warm all over  PE:  Vitals:    12/04/19 1909 12/04/19 2115   BP: 123/81 114/61   BP Location: Left arm Right arm   Pulse: 71 72   Resp: 18 18   Temp: (!) 97 4 °F (36 3 °C)    TempSrc: Tympanic    SpO2: 99% 100%   Weight: 65 8 kg (145 lb)    General: VSS, NAD, awake, alert  Well-nourished, well-developed  Appears stated age  Speaking normally in full sentences  Head: Normocephalic, atraumatic, nontender  Eyes: PERRL, EOM-I  No diplopia  No hyphema  No subconjunctival hemorrhages  Symmetrical lids  ENT: Atraumatic external nose and ears  MMM  No malocclusion  No stridor  Normal phonation  No drooling  Normal swallowing  Neck: Symmetric, trachea midline  No JVD  CV: RRR  +S1/S2  No murmurs or gallops  Peripheral pulses +2 throughout  No chest wall tenderness  Lungs:   Unlabored No retractions  CTAB, lungs sounds equal bilateral    No tachypnea  Abd: +BS, soft, NT/ND    MSK:   FROM   Back:   No rashes  Skin: Dry, intact  Neuro: AAOx3, GCS 15, CN II-XII grossly intact  Motor grossly intact   5/5  HF HE 5/5  Psychiatric/Behavioral: Appropriate mood and affect   Exam: deferred  A:  - headache, resolved  - "unable to eat" but not nausea/vomiting? P:  - discussed symptomatic measures, re-evalutea   - 13 point ROS was performed and all are normal unless stated in the history above  - Nursing note reviewed  Vitals reviewed  - Orders placed by myself and/or advanced practitioner / resident     - Previous chart was reviewed  -  used : language barrier    - History obtained from patient  - There are no limitations to the history obtained  - Critical care time: Not applicable for this patient  Code Status: No Order  Advance Directive and Living Will:      Power of :    POLST:      Final Diagnosis:  1  Headache    2   Medication side effect           Medications   sodium chloride 0 9 % bolus 1,000 mL (0 mL Intravenous Stopped 12/4/19 1266)     No orders to display     No orders of the defined types were placed in this encounter  Labs Reviewed - No data to display  Time reflects when diagnosis was documented in both MDM as applicable and the Disposition within this note     Time User Action Codes Description Comment    12/4/2019 10:56 PM Sindy Sow Add [R51] Headache     12/4/2019 10:56 PM Sindy Sow Add [T88  7XXA] Medication side effect       ED Disposition     ED Disposition Condition Date/Time Comment    Discharge Good Wed Dec 4, 2019 10:56 PM Kaden Milian discharge to home/self care  Follow-up Information     Follow up With Specialties Details Why [address] Ankush Constantino MD Internal Medicine Call in 1 day Please follow up with your PCP within 1 week  [address] [facility] [address] [facility]  [phone]          Discharge Medication List as of 12/4/2019 10:57 PM      CONTINUE these medications which have NOT CHANGED    Details   ALPRAZolam (XANAX) 0 5 mg tablet Take by mouth 2 (two) times a day as needed, Historical Med      buPROPion (WELLBUTRIN XL) 300 mg 24 hr tablet Take by mouth, Historical Med      busPIRone (BUSPAR) 15 mg tablet Take 15 mg by mouth 2 (two) times a day, Historical Med      DULoxetine (CYMBALTA) 30 mg delayed release capsule Take 1 capsule (30 mg total) by mouth daily, Starting Fri 7/19/2019, Normal      gabapentin (NEURONTIN) 300 mg capsule Take 1 capsule (300 mg total) by mouth 3 (three) times a day for 90 days, Starting Wed 12/12/2018, Until Fri 11/1/2019, Normal      OLANZapine (ZyPREXA) 20 MG tablet Take 20 mg by mouth daily at bedtime  , Until Discontinued, Historical Med      pregabalin (LYRICA) 50 mg capsule Take 50 mg by mouth 3 (three) times a day, Historical Med      RA VITAMIN D-3 2000 units CAPS take 1 capsule by mouth once daily, Normal           No discharge procedures on file  Prior to Admission Medications   Prescriptions Last Dose Informant Patient Reported? Taking? ALPRAZolam (XANAX) 0 5 mg tablet   Yes No   Sig: Take by mouth 2 (two) times a day as needed   DULoxetine (CYMBALTA) 30 mg delayed release capsule   No No   Sig: Take 1 capsule (30 mg total) by mouth daily   Patient not taking: Reported on 10/29/2019   OLANZapine (ZyPREXA) 20 MG tablet   Yes No   Sig: Take 20 mg by mouth daily at bedtime  RA VITAMIN D-3 2000 units CAPS   No No   Sig: take 1 capsule by mouth once daily   buPROPion (WELLBUTRIN XL) 300 mg 24 hr tablet   Yes No   Sig: Take by mouth   busPIRone (BUSPAR) 15 mg tablet   Yes No   Sig: Take 15 mg by mouth 2 (two) times a day   gabapentin (NEURONTIN) 300 mg capsule   No No   Sig: Take 1 capsule (300 mg total) by mouth 3 (three) times a day for 90 days   Patient taking differently: Take 300 mg by mouth 3 (three) times a day    pregabalin (LYRICA) 50 mg capsule   Yes No   Sig: Take 50 mg by mouth 3 (three) times a day      Facility-Administered Medications: None       Portions of the record may have been created with voice recognition software  Occasional wrong word or "sound a like" substitutions may have occurred due to the inherent limitations of voice recognition software  Read the chart carefully and recognize, using context, where substitutions have occurred      Electronically signed by:  Leonor Salgado

## 2019-12-11 ENCOUNTER — TELEPHONE (OUTPATIENT)
Dept: INTERNAL MEDICINE CLINIC | Facility: CLINIC | Age: 54
End: 2019-12-11

## 2019-12-11 ENCOUNTER — OFFICE VISIT (OUTPATIENT)
Dept: INTERNAL MEDICINE CLINIC | Facility: CLINIC | Age: 54
End: 2019-12-11

## 2019-12-11 VITALS
BODY MASS INDEX: 28.31 KG/M2 | SYSTOLIC BLOOD PRESSURE: 128 MMHG | HEIGHT: 60 IN | WEIGHT: 144.18 LBS | HEART RATE: 69 BPM | DIASTOLIC BLOOD PRESSURE: 72 MMHG | TEMPERATURE: 97.2 F | OXYGEN SATURATION: 100 %

## 2019-12-11 DIAGNOSIS — T78.40XD ALLERGIC REACTION TO DRUG, SUBSEQUENT ENCOUNTER: Primary | ICD-10-CM

## 2019-12-11 PROCEDURE — 99212 OFFICE O/P EST SF 10 MIN: CPT | Performed by: INTERNAL MEDICINE

## 2019-12-11 PROCEDURE — 3008F BODY MASS INDEX DOCD: CPT | Performed by: INTERNAL MEDICINE

## 2019-12-11 PROCEDURE — 1036F TOBACCO NON-USER: CPT | Performed by: INTERNAL MEDICINE

## 2019-12-11 NOTE — TELEPHONE ENCOUNTER
Patient calling to report side effects she's experiencing after she went to ED on 12/04/419  Patient states she's having panic attacks, difficulty breathing and sudden swelling of hands and feet  Call transferred to [facility], [place] [place])
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
## 2019-12-11 NOTE — TELEPHONE ENCOUNTER
Spoke with patient in regards to her symptoms she reports she was prescribed Diclofenac 50 mg by her rheumatologist Dr Mihai Pedraza for her fibromyalgia but it caused her some side effects  Patient reports that when taking it she experienced swelling in her eyes, hands, feet, mandible, and she couldn't eat  Patient states her throat was also swelling and has SOB  Patient since then has stopped taking this medication since 12/2 and her specialist is aware  However lately she has been experiencing what she believes maybe panic attacks since then  Patient reports never feeling this before she feels very hot, is unsure if her blood pressure but she gets a headache, swelling and she believes a panic attack may be occurring   Patient has been scheduled to see you today at 11 am

## 2019-12-20 ENCOUNTER — TELEPHONE (OUTPATIENT)
Dept: INTERNAL MEDICINE CLINIC | Facility: CLINIC | Age: 54
End: 2019-12-20

## 2019-12-20 NOTE — TELEPHONE ENCOUNTER
Hello, it's coming up as an allergy as the patient had a reaction to dicloflonac prior  Also, at the clinic we do mammograms every 2 years primarily, there is different recommendations, if she really feels strongly about it I can send one in, but she had one within the year  Thanks!

## 2019-12-20 NOTE — TELEPHONE ENCOUNTER
I call patient and she said the she take ibuprofen before with out allergy reaction  Can you please sent the prescription to the pharmacy for this patient   Add the Mammogram referral on the system for patient

## 2019-12-21 DIAGNOSIS — E55.9 VITAMIN D DEFICIENCY: Primary | ICD-10-CM

## 2019-12-21 DIAGNOSIS — G89.4 CHRONIC PAIN DISORDER: ICD-10-CM

## 2019-12-21 DIAGNOSIS — Z12.39 SCREENING FOR BREAST CANCER: ICD-10-CM

## 2019-12-21 RX ORDER — IBUPROFEN 400 MG/1
400 TABLET ORAL 2 TIMES DAILY PRN
Qty: 14 TABLET | Refills: 0 | Status: SHIPPED | OUTPATIENT
Start: 2019-12-21 | End: 2020-04-23 | Stop reason: ALTCHOICE

## 2020-01-02 ENCOUNTER — APPOINTMENT (OUTPATIENT)
Dept: LAB | Facility: CLINIC | Age: 55
End: 2020-01-02
Payer: COMMERCIAL

## 2020-01-02 DIAGNOSIS — M25.561 RIGHT KNEE PAIN, UNSPECIFIED CHRONICITY: ICD-10-CM

## 2020-01-02 DIAGNOSIS — Z13.89 SCREENING FOR RHEUMATIC DISORDER: ICD-10-CM

## 2020-01-02 DIAGNOSIS — M54.2 NECK PAIN: ICD-10-CM

## 2020-01-02 DIAGNOSIS — M25.50 ARTHRALGIA, UNSPECIFIED JOINT: Primary | ICD-10-CM

## 2020-01-02 DIAGNOSIS — R53.83 FATIGUE, UNSPECIFIED TYPE: ICD-10-CM

## 2020-01-02 DIAGNOSIS — M54.50 LOW BACK PAIN, UNSPECIFIED BACK PAIN LATERALITY, UNSPECIFIED CHRONICITY, UNSPECIFIED WHETHER SCIATICA PRESENT: ICD-10-CM

## 2020-01-02 LAB
25(OH)D3 SERPL-MCNC: 31.1 NG/ML (ref 30–100)
BASOPHILS # BLD AUTO: 0.01 THOUSANDS/ΜL (ref 0–0.1)
BASOPHILS NFR BLD AUTO: 0 % (ref 0–1)
CK SERPL-CCNC: 59 U/L (ref 26–192)
CRP SERPL QL: <3 MG/L
EOSINOPHIL # BLD AUTO: 0.08 THOUSAND/ΜL (ref 0–0.61)
EOSINOPHIL NFR BLD AUTO: 2 % (ref 0–6)
ERYTHROCYTE [DISTWIDTH] IN BLOOD BY AUTOMATED COUNT: 13 % (ref 11.6–15.1)
ERYTHROCYTE [SEDIMENTATION RATE] IN BLOOD: 25 MM/HOUR (ref 0–20)
HAV IGM SER QL: NORMAL
HBV CORE IGM SER QL: NORMAL
HBV SURFACE AG SER QL: NORMAL
HCT VFR BLD AUTO: 40 % (ref 34.8–46.1)
HCV AB SER QL: NORMAL
HGB BLD-MCNC: 13.1 G/DL (ref 11.5–15.4)
IMM GRANULOCYTES # BLD AUTO: 0.02 THOUSAND/UL (ref 0–0.2)
IMM GRANULOCYTES NFR BLD AUTO: 0 % (ref 0–2)
LYMPHOCYTES # BLD AUTO: 2.06 THOUSANDS/ΜL (ref 0.6–4.47)
LYMPHOCYTES NFR BLD AUTO: 41 % (ref 14–44)
MCH RBC QN AUTO: 28.4 PG (ref 26.8–34.3)
MCHC RBC AUTO-ENTMCNC: 32.8 G/DL (ref 31.4–37.4)
MCV RBC AUTO: 87 FL (ref 82–98)
MONOCYTES # BLD AUTO: 0.29 THOUSAND/ΜL (ref 0.17–1.22)
MONOCYTES NFR BLD AUTO: 6 % (ref 4–12)
NEUTROPHILS # BLD AUTO: 2.63 THOUSANDS/ΜL (ref 1.85–7.62)
NEUTS SEG NFR BLD AUTO: 51 % (ref 43–75)
NRBC BLD AUTO-RTO: 0 /100 WBCS
PLATELET # BLD AUTO: 174 THOUSANDS/UL (ref 149–390)
PMV BLD AUTO: 9.9 FL (ref 8.9–12.7)
RBC # BLD AUTO: 4.62 MILLION/UL (ref 3.81–5.12)
TSH SERPL DL<=0.05 MIU/L-ACNC: 1.42 UIU/ML (ref 0.36–3.74)
WBC # BLD AUTO: 5.09 THOUSAND/UL (ref 4.31–10.16)

## 2020-01-02 PROCEDURE — 84165 PROTEIN E-PHORESIS SERUM: CPT

## 2020-01-02 PROCEDURE — 82306 VITAMIN D 25 HYDROXY: CPT

## 2020-01-02 PROCEDURE — 82550 ASSAY OF CK (CPK): CPT

## 2020-01-02 PROCEDURE — 86235 NUCLEAR ANTIGEN ANTIBODY: CPT

## 2020-01-02 PROCEDURE — 86140 C-REACTIVE PROTEIN: CPT

## 2020-01-02 PROCEDURE — 85652 RBC SED RATE AUTOMATED: CPT

## 2020-01-02 PROCEDURE — 80074 ACUTE HEPATITIS PANEL: CPT

## 2020-01-02 PROCEDURE — 86038 ANTINUCLEAR ANTIBODIES: CPT

## 2020-01-02 PROCEDURE — 84165 PROTEIN E-PHORESIS SERUM: CPT | Performed by: PATHOLOGY

## 2020-01-02 PROCEDURE — 85025 COMPLETE CBC W/AUTO DIFF WBC: CPT

## 2020-01-02 PROCEDURE — 81374 HLA I TYPING 1 ANTIGEN LR: CPT

## 2020-01-02 PROCEDURE — 86430 RHEUMATOID FACTOR TEST QUAL: CPT

## 2020-01-02 PROCEDURE — 84443 ASSAY THYROID STIM HORMONE: CPT

## 2020-01-02 PROCEDURE — 36415 COLL VENOUS BLD VENIPUNCTURE: CPT

## 2020-01-02 PROCEDURE — 86200 CCP ANTIBODY: CPT

## 2020-01-03 LAB
CCP IGA+IGG SERPL IA-ACNC: 8 UNITS (ref 0–19)
ENA SS-A AB SER-ACNC: <0.2 AI (ref 0–0.9)
ENA SS-B AB SER-ACNC: <0.2 AI (ref 0–0.9)
RHEUMATOID FACT SER QL LA: NEGATIVE
RYE IGE QN: NEGATIVE

## 2020-01-04 LAB
ALBUMIN SERPL ELPH-MCNC: 4.29 G/DL (ref 3.5–5)
ALBUMIN SERPL ELPH-MCNC: 60.4 % (ref 52–65)
ALPHA1 GLOB SERPL ELPH-MCNC: 0.28 G/DL (ref 0.1–0.4)
ALPHA1 GLOB SERPL ELPH-MCNC: 3.9 % (ref 2.5–5)
ALPHA2 GLOB SERPL ELPH-MCNC: 0.68 G/DL (ref 0.4–1.2)
ALPHA2 GLOB SERPL ELPH-MCNC: 9.6 % (ref 7–13)
BETA GLOB ABNORMAL SERPL ELPH-MCNC: 0.36 G/DL (ref 0.4–0.8)
BETA1 GLOB SERPL ELPH-MCNC: 5.1 % (ref 5–13)
BETA2 GLOB SERPL ELPH-MCNC: 5.5 % (ref 2–8)
BETA2+GAMMA GLOB SERPL ELPH-MCNC: 0.39 G/DL (ref 0.2–0.5)
GAMMA GLOB ABNORMAL SERPL ELPH-MCNC: 1.1 G/DL (ref 0.5–1.6)
GAMMA GLOB SERPL ELPH-MCNC: 15.5 % (ref 12–22)
IGG/ALB SER: 1.53 {RATIO} (ref 1.1–1.8)
PROT PATTERN SERPL ELPH-IMP: ABNORMAL
PROT SERPL-MCNC: 7.1 G/DL (ref 6.4–8.2)

## 2020-01-07 LAB — HLA-B27 QL NAA+PROBE: NEGATIVE

## 2020-01-19 DIAGNOSIS — M79.7 FIBROMYALGIA: ICD-10-CM

## 2020-01-19 DIAGNOSIS — G89.4 CHRONIC PAIN DISORDER: ICD-10-CM

## 2020-01-19 RX ORDER — DULOXETIN HYDROCHLORIDE 30 MG/1
CAPSULE, DELAYED RELEASE ORAL
Qty: 90 CAPSULE | Refills: 0 | Status: SHIPPED | OUTPATIENT
Start: 2020-01-19 | End: 2020-03-22

## 2020-01-21 ENCOUNTER — HOSPITAL ENCOUNTER (EMERGENCY)
Facility: HOSPITAL | Age: 55
Discharge: HOME | End: 2020-01-21
Attending: EMERGENCY MEDICINE | Admitting: EMERGENCY MEDICINE
Payer: COMMERCIAL

## 2020-01-21 ENCOUNTER — APPOINTMENT (EMERGENCY)
Dept: RADIOLOGY | Facility: HOSPITAL | Age: 55
End: 2020-01-21
Payer: COMMERCIAL

## 2020-01-21 VITALS
TEMPERATURE: 97.6 F | OXYGEN SATURATION: 100 % | WEIGHT: 144.84 LBS | HEART RATE: 62 BPM | RESPIRATION RATE: 18 BRPM | SYSTOLIC BLOOD PRESSURE: 125 MMHG | DIASTOLIC BLOOD PRESSURE: 58 MMHG | BODY MASS INDEX: 28.29 KG/M2

## 2020-01-21 DIAGNOSIS — R06.02 SHORTNESS OF BREATH: ICD-10-CM

## 2020-01-21 DIAGNOSIS — R09.89 GLOBUS SENSATION: Primary | ICD-10-CM

## 2020-01-21 DIAGNOSIS — R13.10 DIFFICULTY SWALLOWING: ICD-10-CM

## 2020-01-21 LAB
ALBUMIN SERPL BCP-MCNC: 4.2 G/DL (ref 3.5–5)
ALP SERPL-CCNC: 87 U/L (ref 46–116)
ALT SERPL W P-5'-P-CCNC: 27 U/L (ref 12–78)
ANION GAP SERPL CALCULATED.3IONS-SCNC: 5 MMOL/L (ref 4–13)
APTT PPP: 30 SECONDS (ref 23–37)
AST SERPL W P-5'-P-CCNC: 16 U/L (ref 5–45)
BASOPHILS # BLD AUTO: 0.01 THOUSANDS/ΜL (ref 0–0.1)
BASOPHILS NFR BLD AUTO: 0 % (ref 0–1)
BILIRUB SERPL-MCNC: 0.41 MG/DL (ref 0.2–1)
BUN SERPL-MCNC: 9 MG/DL (ref 5–25)
CALCIUM SERPL-MCNC: 9.5 MG/DL (ref 8.3–10.1)
CHLORIDE SERPL-SCNC: 110 MMOL/L (ref 100–108)
CO2 SERPL-SCNC: 25 MMOL/L (ref 21–32)
CREAT SERPL-MCNC: 0.63 MG/DL (ref 0.6–1.3)
EOSINOPHIL # BLD AUTO: 0.04 THOUSAND/ΜL (ref 0–0.61)
EOSINOPHIL NFR BLD AUTO: 1 % (ref 0–6)
ERYTHROCYTE [DISTWIDTH] IN BLOOD BY AUTOMATED COUNT: 12.8 % (ref 11.6–15.1)
GFR SERPL CREATININE-BSD FRML MDRD: 119 ML/MIN/1.73SQ M
GLUCOSE SERPL-MCNC: 98 MG/DL (ref 65–140)
HCT VFR BLD AUTO: 38.8 % (ref 34.8–46.1)
HGB BLD-MCNC: 13.5 G/DL (ref 11.5–15.4)
IMM GRANULOCYTES # BLD AUTO: 0.01 THOUSAND/UL (ref 0–0.2)
IMM GRANULOCYTES NFR BLD AUTO: 0 % (ref 0–2)
INR PPP: 1.08 (ref 0.84–1.19)
LYMPHOCYTES # BLD AUTO: 2.15 THOUSANDS/ΜL (ref 0.6–4.47)
LYMPHOCYTES NFR BLD AUTO: 45 % (ref 14–44)
MCH RBC QN AUTO: 29 PG (ref 26.8–34.3)
MCHC RBC AUTO-ENTMCNC: 34.8 G/DL (ref 31.4–37.4)
MCV RBC AUTO: 83 FL (ref 82–98)
MONOCYTES # BLD AUTO: 0.33 THOUSAND/ΜL (ref 0.17–1.22)
MONOCYTES NFR BLD AUTO: 7 % (ref 4–12)
NEUTROPHILS # BLD AUTO: 2.29 THOUSANDS/ΜL (ref 1.85–7.62)
NEUTS SEG NFR BLD AUTO: 47 % (ref 43–75)
NRBC BLD AUTO-RTO: 0 /100 WBCS
PLATELET # BLD AUTO: 172 THOUSANDS/UL (ref 149–390)
PMV BLD AUTO: 9.4 FL (ref 8.9–12.7)
POTASSIUM SERPL-SCNC: 3.8 MMOL/L (ref 3.5–5.3)
PROT SERPL-MCNC: 7.9 G/DL (ref 6.4–8.2)
PROTHROMBIN TIME: 13.6 SECONDS (ref 11.6–14.5)
RBC # BLD AUTO: 4.66 MILLION/UL (ref 3.81–5.12)
SODIUM SERPL-SCNC: 140 MMOL/L (ref 136–145)
TROPONIN I SERPL-MCNC: <0.02 NG/ML
TSH SERPL DL<=0.05 MIU/L-ACNC: 1.94 UIU/ML (ref 0.36–3.74)
WBC # BLD AUTO: 4.83 THOUSAND/UL (ref 4.31–10.16)

## 2020-01-21 PROCEDURE — 84443 ASSAY THYROID STIM HORMONE: CPT | Performed by: EMERGENCY MEDICINE

## 2020-01-21 PROCEDURE — 85025 COMPLETE CBC W/AUTO DIFF WBC: CPT | Performed by: EMERGENCY MEDICINE

## 2020-01-21 PROCEDURE — 93005 ELECTROCARDIOGRAM TRACING: CPT

## 2020-01-21 PROCEDURE — 99285 EMERGENCY DEPT VISIT HI MDM: CPT

## 2020-01-21 PROCEDURE — 71046 X-RAY EXAM CHEST 2 VIEWS: CPT

## 2020-01-21 PROCEDURE — 99285 EMERGENCY DEPT VISIT HI MDM: CPT | Performed by: EMERGENCY MEDICINE

## 2020-01-21 PROCEDURE — 84484 ASSAY OF TROPONIN QUANT: CPT | Performed by: EMERGENCY MEDICINE

## 2020-01-21 PROCEDURE — 85610 PROTHROMBIN TIME: CPT | Performed by: EMERGENCY MEDICINE

## 2020-01-21 PROCEDURE — 85730 THROMBOPLASTIN TIME PARTIAL: CPT | Performed by: EMERGENCY MEDICINE

## 2020-01-21 PROCEDURE — 36415 COLL VENOUS BLD VENIPUNCTURE: CPT

## 2020-01-21 PROCEDURE — 80053 COMPREHEN METABOLIC PANEL: CPT | Performed by: EMERGENCY MEDICINE

## 2020-01-21 RX ORDER — DICLOFENAC POTASSIUM 50 MG/1
50 TABLET, FILM COATED ORAL 2 TIMES DAILY
COMMUNITY
End: 2020-07-10
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
## 2020-01-21 NOTE — ED ATTENDING ATTESTATION
1/21/2020  I, Alvin Hogan MD, saw and evaluated the patient  I have discussed the patient with the resident/non-physician practitioner and agree with the resident's/non-physician practitioner's findings, Plan of Care, and MDM as documented in the resident's/non-physician practitioner's note, except where noted  All available labs and Radiology studies were reviewed  I was present for key portions of any procedure(s) performed by the resident/non-physician practitioner and I was immediately available to provide assistance  At this point I agree with the current assessment done in the Emergency Department  I have conducted an independent evaluation of this patient a history and physical is as follows:  Patient presents to the emergency department with a complaint a sensation of swelling in her throat  The patient reports this is been here continuously, every day, since early December  The symptoms have not improved over that time course  The patient has no trouble swallowing reports she has no trouble breathing  The patient does not feel short of breath  The patient just has this persistent sense of her throat being swollen  The patient reports she is able to swallow liquids and solids although she chews her solids thoroughly because she feels like she might choke although she has never actually choked her had any difficulty swallowing  The patient has had no esophageal foreign body sensation or difficulty getting liquids down or solids down  The patient denies any chest pain or shortness of breath  The patient reports that she has been seen by her primary care provider for the same problem  Physical exam demonstrates a pleasant alert nontoxic female no acute distress  The patient is well-hydrated  HEENT exam is normal   The throat appeared to be normal and the neck was normal to examination  There were no palpable masses in the neck    The skin over the neck was normal   No goiter was appreciated  The lungs are clear with equal breath sounds  The heart had a regular rhythm  The abdomen is soft and nontender  Skin had no rash    There is a normal neurologic exam   ED Course         Critical Care Time  Procedures

## 2020-01-22 ENCOUNTER — OFFICE VISIT (OUTPATIENT)
Dept: GASTROENTEROLOGY | Facility: CLINIC | Age: 55
End: 2020-01-22
Payer: COMMERCIAL

## 2020-01-22 VITALS
SYSTOLIC BLOOD PRESSURE: 116 MMHG | WEIGHT: 142.4 LBS | HEART RATE: 60 BPM | DIASTOLIC BLOOD PRESSURE: 76 MMHG | TEMPERATURE: 97.5 F | BODY MASS INDEX: 27.96 KG/M2 | HEIGHT: 60 IN

## 2020-01-22 DIAGNOSIS — Z11.59 ENCOUNTER FOR HEPATITIS C SCREENING TEST FOR LOW RISK PATIENT: ICD-10-CM

## 2020-01-22 DIAGNOSIS — Z12.11 COLON CANCER SCREENING: ICD-10-CM

## 2020-01-22 DIAGNOSIS — E55.9 VITAMIN D DEFICIENCY: ICD-10-CM

## 2020-01-22 DIAGNOSIS — Z12.11 COLON CANCER SCREENING: Primary | ICD-10-CM

## 2020-01-22 DIAGNOSIS — R13.12 OROPHARYNGEAL DYSPHAGIA: Primary | ICD-10-CM

## 2020-01-22 LAB
ATRIAL RATE: 73 BPM
P AXIS: 54 DEGREES
PR INTERVAL: 138 MS
QRS AXIS: 54 DEGREES
QRSD INTERVAL: 70 MS
QT INTERVAL: 386 MS
QTC INTERVAL: 425 MS
T WAVE AXIS: 48 DEGREES
VENTRICULAR RATE: 73 BPM

## 2020-01-22 PROCEDURE — 93010 ELECTROCARDIOGRAM REPORT: CPT | Performed by: INTERNAL MEDICINE

## 2020-01-22 PROCEDURE — 99244 OFF/OP CNSLTJ NEW/EST MOD 40: CPT | Performed by: INTERNAL MEDICINE
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
## 2020-01-22 NOTE — PATIENT INSTRUCTIONS
EGD/COLON scheduled with Dr Baeza at [facility] on 2/11/20  Roni gave patient verbal instructions/paper work given    Suprep sample given to patient

## 2020-02-03 ENCOUNTER — APPOINTMENT (OUTPATIENT)
Dept: LAB | Facility: CLINIC | Age: 55
End: 2020-02-03
Payer: COMMERCIAL

## 2020-02-03 DIAGNOSIS — Z11.59 ENCOUNTER FOR HEPATITIS C SCREENING TEST FOR LOW RISK PATIENT: ICD-10-CM

## 2020-02-03 LAB — HCV AB SER QL: NORMAL

## 2020-02-03 PROCEDURE — 86803 HEPATITIS C AB TEST: CPT

## 2020-02-03 PROCEDURE — 36415 COLL VENOUS BLD VENIPUNCTURE: CPT

## 2020-02-11 ENCOUNTER — HOSPITAL ENCOUNTER (OUTPATIENT)
Dept: GASTROENTEROLOGY | Facility: AMBULARY SURGERY CENTER | Age: 55
Setting detail: OUTPATIENT SURGERY
Discharge: HOME | End: 2020-02-11
Attending: INTERNAL MEDICINE

## 2020-02-11 RX ORDER — SODIUM CHLORIDE, SODIUM LACTATE, POTASSIUM CHLORIDE, CALCIUM CHLORIDE 600; 310; 30; 20 MG/100ML; MG/100ML; MG/100ML; MG/100ML
125 INJECTION, SOLUTION INTRAVENOUS CONTINUOUS
Status: CANCELLED | OUTPATIENT
Start: 2020-02-11

## 2020-02-11 RX ORDER — LIDOCAINE HYDROCHLORIDE 10 MG/ML
0.5 INJECTION, SOLUTION EPIDURAL; INFILTRATION; INTRACAUDAL; PERINEURAL ONCE AS NEEDED
Status: CANCELLED | OUTPATIENT
Start: 2020-02-11

## 2020-03-20 DIAGNOSIS — M79.7 FIBROMYALGIA: ICD-10-CM

## 2020-03-20 DIAGNOSIS — G89.4 CHRONIC PAIN DISORDER: ICD-10-CM

## 2020-03-22 RX ORDER — DULOXETIN HYDROCHLORIDE 30 MG/1
CAPSULE, DELAYED RELEASE ORAL
Qty: 90 CAPSULE | Refills: 3 | Status: SHIPPED | OUTPATIENT
Start: 2020-03-22 | End: 2020-04-12

## 2020-03-23 ENCOUNTER — TELEPHONE (OUTPATIENT)
Dept: GASTROENTEROLOGY | Facility: CLINIC | Age: 55
End: 2020-03-23

## 2020-03-23 NOTE — TELEPHONE ENCOUNTER
LMOM for pt to call back and rescheduled cancelled procedure for 04/15/20   Procedure was cancelled due to COVID-19

## 2020-04-12 DIAGNOSIS — M79.7 FIBROMYALGIA: ICD-10-CM

## 2020-04-12 DIAGNOSIS — G89.4 CHRONIC PAIN DISORDER: ICD-10-CM

## 2020-04-12 RX ORDER — DULOXETIN HYDROCHLORIDE 30 MG/1
CAPSULE, DELAYED RELEASE ORAL
Qty: 90 CAPSULE | Refills: 3 | Status: SHIPPED | OUTPATIENT
Start: 2020-04-12 | End: 2020-11-18

## 2020-04-23 ENCOUNTER — TELEMEDICINE (OUTPATIENT)
Dept: INTERNAL MEDICINE CLINIC | Facility: CLINIC | Age: 55
End: 2020-04-23

## 2020-04-23 DIAGNOSIS — Z12.4 CERVICAL CANCER SCREENING: ICD-10-CM

## 2020-04-23 DIAGNOSIS — G89.4 CHRONIC PAIN DISORDER: ICD-10-CM

## 2020-04-23 DIAGNOSIS — E55.9 VITAMIN D DEFICIENCY: ICD-10-CM

## 2020-04-23 DIAGNOSIS — M79.7 FIBROMYALGIA: Primary | ICD-10-CM

## 2020-04-23 PROCEDURE — 99214 OFFICE O/P EST MOD 30 MIN: CPT | Performed by: HOSPITALIST

## 2020-04-23 PROCEDURE — T1015 CLINIC SERVICE: HCPCS | Performed by: HOSPITALIST

## 2020-07-10 ENCOUNTER — OFFICE VISIT (OUTPATIENT)
Dept: INTERNAL MEDICINE CLINIC | Facility: CLINIC | Age: 55
End: 2020-07-10

## 2020-07-10 VITALS
HEIGHT: 60 IN | DIASTOLIC BLOOD PRESSURE: 70 MMHG | SYSTOLIC BLOOD PRESSURE: 110 MMHG | TEMPERATURE: 97.1 F | HEART RATE: 60 BPM | BODY MASS INDEX: 28.91 KG/M2 | WEIGHT: 147.27 LBS

## 2020-07-10 DIAGNOSIS — E55.9 VITAMIN D DEFICIENCY: ICD-10-CM

## 2020-07-10 DIAGNOSIS — M54.2 NECK PAIN: ICD-10-CM

## 2020-07-10 DIAGNOSIS — R23.3 SPONTANEOUS BRUISING: Primary | ICD-10-CM

## 2020-07-10 DIAGNOSIS — M79.601 PAIN OF RIGHT UPPER EXTREMITY: ICD-10-CM

## 2020-07-10 DIAGNOSIS — R20.2 TINGLING SENSATION: ICD-10-CM

## 2020-07-10 PROBLEM — D69.6 DECREASED PLATELET COUNT (HCC): Status: ACTIVE | Noted: 2020-07-10

## 2020-07-10 PROBLEM — Z11.59 ENCOUNTER FOR HEPATITIS C SCREENING TEST FOR LOW RISK PATIENT: Status: RESOLVED | Noted: 2020-01-22 | Resolved: 2020-07-10

## 2020-07-10 PROBLEM — F41.9 ANXIETY: Status: ACTIVE | Noted: 2020-07-10

## 2020-07-10 PROBLEM — R92.8 ABNORMAL FINDING ON MAMMOGRAPHY: Status: ACTIVE | Noted: 2020-07-10

## 2020-07-10 PROBLEM — N20.0 CALCULUS OF KIDNEY: Status: ACTIVE | Noted: 2020-07-10

## 2020-07-10 PROCEDURE — 3008F BODY MASS INDEX DOCD: CPT | Performed by: PHYSICIAN ASSISTANT

## 2020-07-10 PROCEDURE — 3008F BODY MASS INDEX DOCD: CPT | Performed by: INTERNAL MEDICINE

## 2020-07-10 PROCEDURE — 99214 OFFICE O/P EST MOD 30 MIN: CPT | Performed by: PHYSICIAN ASSISTANT

## 2020-07-10 PROCEDURE — 1036F TOBACCO NON-USER: CPT | Performed by: PHYSICIAN ASSISTANT

## 2020-07-10 RX ORDER — HYDROCODONE BITARTRATE AND ACETAMINOPHEN 5; 325 MG/1; MG/1
TABLET ORAL
COMMUNITY
Start: 2016-06-28 | End: 2020-11-13 | Stop reason: HOSPADM

## 2020-07-10 RX ORDER — NAPROXEN 500 MG/1
TABLET ORAL
COMMUNITY
Start: 2015-06-17 | End: 2020-07-10

## 2020-07-10 RX ORDER — OXYCODONE HYDROCHLORIDE AND ACETAMINOPHEN 5; 325 MG/1; MG/1
TABLET ORAL
COMMUNITY
End: 2020-07-28 | Stop reason: ALTCHOICE

## 2020-07-10 RX ORDER — GLUCOSAMINE/CHONDR SU A SOD 750-600 MG
TABLET ORAL
Qty: 90 CAPSULE | Refills: 1 | OUTPATIENT
Start: 2020-07-10

## 2020-07-10 RX ORDER — SERTRALINE HYDROCHLORIDE 25 MG/1
25 TABLET, FILM COATED ORAL DAILY
COMMUNITY

## 2020-07-10 NOTE — PROGRESS NOTES
Assessment/Plan:      Diagnoses and all orders for this visit:    Spontaneous bruising  -     CBC and differential; Future  -     APTT; Future  -     Protime-INR; Future  -     Vitamin B12; Future  -     VWF Activity; Future    Tingling sensation  -     CBC and differential; Future  -     Vitamin B12; Future  -     XR spine cervical complete 4 or 5 vw non injury; Future    Neck pain  -     XR spine cervical complete 4 or 5 vw non injury; Future    Pain of right upper extremity  -     XR spine cervical complete 4 or 5 vw non injury; Future    Other orders  -     oxyCODONE-acetaminophen (PERCOCET) 5-325 mg per tablet; Oxycodone-Acetaminophen 5-325 MG Oral Tablet  TAKE 1 TABLET EVERY 4 HOURS AS NEEDED FOR PAIN  Refills: 0    Active  -     sertraline (Zoloft) 25 mg tablet; Zoloft 25 MG Oral Tablet   Refills: 0    Active  -     Discontinue: naproxen (NAPROSYN) 500 mg tablet; Naproxen 500 MG Oral Tablet   Refills: 0     Started 17-June-2015  Active  -     HYDROcodone-acetaminophen (NORCO) 5-325 mg per tablet; Hydrocodone-Acetaminophen 5-325 MG Oral Tablet  TAKE 1 TO 2 TABLETS EVERY 4 TO 6 HOURS AS NEEDED FOR PAIN  Quantity: 15; Refills: 0       Alexis Avalos MD;  Started 28-June-2016  Active      patient is a 56-year-old female presenting today for same-day medical home appointment, resident patient, bring up multiple complaints at today's visit, initially scheduled for bruising on her thighs   via telephone needed  Patient has bruising addressed initially, only appearing to have a faint small bruise on her right anterolateral thigh which is not tender to touch  She states that the bruising when she gets it on her thighs and 1 that she had on her right arm were all tender when they started but she denies any specific trauma, force or pressure over the area to initiate the bruising    I explained to patient that I would think the bruising would be tender because of some sort of trauma or pressure over the area to initiate the bruising and typically she should not have any specific tenderness if the bruising is spontaneous  Also interestingly enough patient states that she took aspirin for the problem which thin blood and puts patient is more at risk for easy bruising, though she states that the aspirin actually helped to resolve the problem  Unfortunately I am quite baffled by patient's presentation at this time  Difficult to tell if she truly did have some sort of trauma that she did not recall which I am suspecting being that this is a new problem just for the past month and she has not noticed any new bruises since onset  For patient's peace of mind I will check some routine labs for clotting deficiency workup including VWF, CBC, PT and PTT as well as vitamin B12  In meantime I did advise patient to monitor closely any new bruising and certainly be aware if she does have any trauma to the area that could have caused it  She does have a decreased platelet count noted in her problem list so I do think this is worth working up with blood work  Patient also complaining of pain and tingling in her right neck also for the past month with pain into her right upper arm  There are no obvious deficits on examination today and only slight pain in her right neck reproduced with range of motion testing  Will order cervical spine x-ray to rule out stenosis or other cause to her sensations in her neck  During today's visit patient also complained of multiple other things including swelling in her hands and feet and requesting a circulation test because she thinks her blood is too thick  She also complains of feeling pressure in her head when she bends over  Patient had no evidence of arterial insufficiency or venous insufficiency on the exam today as there is no obvious swelling or pitting edema    Her arterial pulses in her radial and dorsalis pedis locations are intact, so I do not see a need for any type of circulation testing at present  Regarding the pressure in her head when she bends over, I told her that this can be common for some patients because gravity pulls blood down and typically when you bend your head over blood will rush into your head sometimes causing pressure or dizziness  She does not have any neurological symptoms or headaches otherwise other than symptoms during that specific situation  Patient expresses understanding  Patient states that she did stop all of her medications a month ago when the symptoms started  She denies NSAID use at that time except for aspirin  It is unclear what exact medications in her medication list patient was on prior to her discontinuing all medications a month ago  Most of them are mental health medications for which she follows with a psychiatrist   I would hesitate to restart Cymbalta with risk for bleeding and certainly if she is taking NSAIDs with the medication can certainly increase her risk for bruising as well so I will keep her off this medication at this time  She does have fibromyalgia in her problem list so it would be beneficial for her to consider restarting Lyrica but will have patient follow-up with her resident PCP as well as her psychiatrist to determine what meds should be restarted pending her workup today  Patient is aware that she needs to see her psychiatrist to discuss what medications are appropriate  We will call pt with results when available  Chief Complaint   Patient presents with    Follow-up     patient here today for bruising on the legs  As per patient this began about 3 weeks ago so she began taking Aspirin 81 MG and noticed that the bruising was less    Patient began taking Aspirin 81 MG 2 weeks ago and she is taking it every other day,       Subjective:     Patient ID: Jodie Rivas is a 50 y o  female     52y/o female resident pt here today for [facility] for concern of  1 month of bruising on thighs  [facility]  used for OV  She denies any trauma or anything to cause the bruising  She denies having new bruising since onset, the bruises have been there the entire time  States they used to hurt before and started using aspirin 81mg and they have cleared up  She reports stopping all of her medications because she wasn't sure if any medications could cause the problem  She stopped them about 1 month ago  She states she also had a spot on her right upper arm with bruising that was sore but resolved since onset within past month  She denies any known bleeding disorders  She thinks she has circulation problems, they swell and they hurt  Sometimes She puts her head down and she gets a pressure  She states she thinks her blood is too thick She states she wants all tests to see what medical problems she has  She also reports having tingling in the neck, x 1 month  She denies any headache or headache  She states the pain shoots into her right arm  She does follow with psychiatry for [address] but once again stopped all medications  Review of Systems   Constitutional: Negative  Respiratory: Negative  Cardiovascular: Negative  Gastrointestinal: Negative  Genitourinary: Negative  Musculoskeletal:        Thigh discomfort in areas of bruising  As in HPI   Skin:        Bruising as in HPI   Neurological:        Tingling right neck and RUE   Hematological: Bruises/bleeds easily  The following portions of the patient's history were reviewed and updated as appropriate: allergies, current medications, past family history, past medical history, past social history, past surgical history and problem list       Objective:     Physical Exam   Constitutional: She is oriented to person, place, and time  She appears well-developed  No distress  Neck: Neck supple  Cardiovascular: Normal rate, regular rhythm and normal heart sounds     Pulses:       Carotid pulses are 2+ on the right side, and 2+ on the left side  Dorsalis pedis pulses are 2+ on the right side, and 2+ on the left side  No obvious swelling of legs/feet or arms/hands   Pulmonary/Chest: Effort normal and breath sounds normal    Musculoskeletal:   Mild TTP right cervical paraspinals  No TTP muscles of upper arms or thigh muscles  Full AROM of neck in all directions with minimal discomfort right neck with ROM testing  Lymphadenopathy:        Head (right side): No submandibular and no tonsillar adenopathy present  Head (left side): No submandibular and no tonsillar adenopathy present  Neurological: She is alert and oriented to person, place, and time  She has normal strength  Coordination and gait normal    Skin:   Faint bruise noted right cole-lateral thigh, uncomplicated  I cannot visualize any other distinct bruising elsewhere on body  Psychiatric: Her speech is normal and behavior is normal  Her mood appears anxious (worried)  Vitals reviewed        Vitals:    07/10/20 1312   BP: 110/70   Pulse: 60   Temp: (!) 97 1 °F (36 2 °C)   Weight: 66 8 kg (147 lb 4 3 oz)   Height: 5' (1 524 m)

## 2020-07-13 ENCOUNTER — TRANSCRIBE ORDERS (OUTPATIENT)
Dept: RADIOLOGY | Facility: HOSPITAL | Age: 55
End: 2020-07-13

## 2020-07-13 ENCOUNTER — HOSPITAL ENCOUNTER (OUTPATIENT)
Dept: RADIOLOGY | Facility: HOSPITAL | Age: 55
Discharge: HOME | End: 2020-07-13
Payer: COMMERCIAL

## 2020-07-13 DIAGNOSIS — M54.2 NECK PAIN: ICD-10-CM

## 2020-07-13 DIAGNOSIS — M79.601 PAIN OF RIGHT UPPER EXTREMITY: ICD-10-CM

## 2020-07-13 DIAGNOSIS — R20.2 TINGLING SENSATION: ICD-10-CM

## 2020-07-13 PROCEDURE — 72050 X-RAY EXAM NECK SPINE 4/5VWS: CPT

## 2020-07-17 ENCOUNTER — TELEPHONE (OUTPATIENT)
Dept: INTERNAL MEDICINE CLINIC | Facility: CLINIC | Age: 55
End: 2020-07-17

## 2020-07-17 NOTE — TELEPHONE ENCOUNTER
Patient called, she had her labs and xray completed  Please call her with the results  She does not want to come in for an appt

## 2020-07-20 LAB
APTT PPP: 29 SEC (ref 22–34)
BASOPHILS # BLD AUTO: 0 CELLS/UL (ref 0–200)
BASOPHILS NFR BLD AUTO: 0 %
EOSINOPHIL # BLD AUTO: 48 CELLS/UL (ref 15–500)
EOSINOPHIL NFR BLD AUTO: 1 %
ERYTHROCYTE [DISTWIDTH] IN BLOOD BY AUTOMATED COUNT: 14 % (ref 11–15)
HCT VFR BLD AUTO: 39.2 % (ref 35–45)
HGB BLD-MCNC: 13.1 G/DL (ref 11.7–15.5)
INR PPP: 1
LYMPHOCYTES # BLD AUTO: 2016 CELLS/UL (ref 850–3900)
LYMPHOCYTES NFR BLD AUTO: 42 %
MCH RBC QN AUTO: 28.1 PG (ref 27–33)
MCHC RBC AUTO-ENTMCNC: 33.4 G/DL (ref 32–36)
MCV RBC AUTO: 83.9 FL (ref 80–100)
MONOCYTES # BLD AUTO: 240 CELLS/UL (ref 200–950)
MONOCYTES NFR BLD AUTO: 5 %
NEUTROPHILS # BLD AUTO: 2496 CELLS/UL (ref 1500–7800)
NEUTROPHILS NFR BLD AUTO: 52 %
PLATELET # BLD AUTO: 182 THOUSAND/UL (ref 140–400)
PMV BLD REES-ECKER: 9.6 FL (ref 7.5–12.5)
PROTHROMBIN TIME: 10 SEC (ref 9–11.5)
RBC # BLD AUTO: 4.67 MILLION/UL (ref 3.8–5.1)
VIT B12 SERPL-MCNC: 356 PG/ML (ref 200–1100)
VWF MULTIMERS PPP QL: NORMAL
WBC # BLD AUTO: 4.8 THOUSAND/UL (ref 3.8–10.8)

## 2020-07-28 ENCOUNTER — OFFICE VISIT (OUTPATIENT)
Dept: INTERNAL MEDICINE CLINIC | Facility: CLINIC | Age: 55
End: 2020-07-28

## 2020-07-28 VITALS
BODY MASS INDEX: 28.68 KG/M2 | SYSTOLIC BLOOD PRESSURE: 102 MMHG | HEART RATE: 62 BPM | WEIGHT: 146.83 LBS | DIASTOLIC BLOOD PRESSURE: 62 MMHG | TEMPERATURE: 96.7 F

## 2020-07-28 DIAGNOSIS — F41.9 ANXIETY: ICD-10-CM

## 2020-07-28 DIAGNOSIS — R51.9 NONINTRACTABLE HEADACHE, UNSPECIFIED CHRONICITY PATTERN, UNSPECIFIED HEADACHE TYPE: ICD-10-CM

## 2020-07-28 DIAGNOSIS — M79.7 FIBROMYALGIA: Primary | ICD-10-CM

## 2020-07-28 PROCEDURE — 1036F TOBACCO NON-USER: CPT | Performed by: INTERNAL MEDICINE

## 2020-07-28 PROCEDURE — 99213 OFFICE O/P EST LOW 20 MIN: CPT | Performed by: INTERNAL MEDICINE

## 2020-07-28 RX ORDER — ACETAMINOPHEN 500 MG
500 TABLET ORAL EVERY 6 HOURS PRN
Qty: 30 TABLET | Refills: 0 | Status: SHIPPED | OUTPATIENT
Start: 2020-07-28 | End: 2020-09-23 | Stop reason: SDUPTHER

## 2020-07-28 NOTE — PATIENT INSTRUCTIONS
[place]a tensional   LO QUE NECESITA SABER:   Los pankaj de temi por tensión son por lo general el resultado del estrés, de forzar la vista o de tensión muscular  El dolor podría comenzar en la frente o la parte posterior de la temi  A menudo el dolor se propaga a toda la temi, al cordelia y los hombros  Los medicamentos de Marylen Grooms son el tratamiento más útil y común para los pankaj de temi por tensión  Es posible que el ejercicio, la retroalimentación biológica, la meditación y las técnicas de relajación también contribuyan a calmar el dolor  INSTRUCCIONES SOBRE EL LUISA HOSPITALARIA:   Regrese a la ayala de emergencias si:   · Usted tiene dolor de temi repentino que parece diferente o peor que ana marai pankaj de temi habituales  · Usted tiene dificultad para josephine, hablar o moverse  · Usted se desmaya, se siente confundido o tiene clayton convulsión  · Usted tiene dolor de [place], [place] y rigidez en el cordelia  Pregúntele a hodge Josh Fanti vitaminas y minerales son adecuados para usted  · Ana Maria pankaj de [place] siguen empeorando  · Los pankaj de [place] son tan frecuentes que interfieren con hodge capacidad para hacer hodge trabajo o ana maria actividades normales  · Usted debe mj medicamento para los pankaj de temi con mayor frecuencia de la indicada por hodge médico     · El dolor de temi es tan intenso que le causa vómitos  · Usted tiene preguntas o inquietudes acerca de hodge condición o cuidado  Medicamentos:   · AINEs (Analgésicos antiinflamatorios no esteroides) grant el ibuprofeno, ayudan a disminuir la inflamación, el dolor y la fiebre  [place] medicamento esta disponible con o sin clayton receta médica  Los AINEs pueden causar sangrado estomacal o problemas renales en ciertas personas  Si usted dameon un medicamento anticoagulante, siempre pregúntele a hodge médico si los RIKA son seguros para usted  Siempre moose la etiqueta de [place] medicamento y [place] instrucciones      · Acetaminofeno:  Richard Shin dolor y baja la fiebre  Está disponible sin receta médica  Pregunte la cantidad y la frecuencia con que debe tomarlos  [address]  Melissa las etiquetas de todos los demás medicamentos que esté usando para saber si también contienen acetaminofén, o pregunte a hodge médico o farmacéutico  El acetaminofén puede causar daño en el hígado cuando no se dameon de forma correcta  No use más de 4 gramos (4000 miligramos) en total de acetaminofeno en un día  · [place] roosevelt medicamentos grant se le haya indicado  Consulte con hodge médico si usted sofya que hodge medicamento no le está ayudando o si presenta efectos secundarios  Infórmele si es alérgico a algún medicamento  Mantenga clayton lista actualizada de los [place], las vitaminas y los productos herbales que dameon  Incluya los siguientes datos de los medicamentos: cantidad, frecuencia y motivo de administración  Traiga con usted la lista o los envases de la píldoras a roosevelt citas de seguimiento  Lleve la lista de los medicamentos con usted en basilio de clayton emergencia  [place] de hodge síntomas:   · Mantenga un registro de roosevelt pankaj de [place]  Nancylee Shells y [employer] pankaj de [place] y [employer]  Describa roosevelt síntomas, cómo se siente el dolor, dónde es, y hodge severidad  Registre todo lo que comió o tomó keya las 24 horas antes de que comenzara el dolor de [place]  Tr [place] hodge registro a roosevelt citas  · Aplique calor según indicaciones  El calor podría calmar el dolor de temi y los espasmos musculares  Aplíquese calor en el área lesionada keya 20 a 30 minutos cada 2 horas keya la cantidad de [employer] indiquen  Un baño caliente también puede ayudar a aliviar la tensión muscular y los espasmos  · Aplique hielo según las indicaciones  Es posible que el hielo le alivie el dolor de [place]   Use clayton bolsa con hielo o ponga hielo triturado en clayton bolsa de plástico  Charlynne Becky con clayton toalla y aplíquelo sobre el área por 15 a 20 minutos cada hora según indicaciones  Evite los pankaj de temi por tensión:   · Evite la tensión muscular  No permanezca en la misma posición keya un período prolongado de Kerri  Use clayton almohada diferente si se despierta con dolor en los músculos del cordelia y del hombro  Busque clayton manera de [employer], grant darse un masaje o descansar en clayton habitación tranquila y Particia [place]  · Evite forzar la vista  Asegúrese de tener suficiente india para leer, coser o realizar actividades similares  Hágase exámenes de la vista anuales y use roosevelt lentes grant se le indique  · Duerma lo suficiente  Duerma entre 8 y 10 horas cada noche  Establezca un horario para dormir  Acuéstese y levántese a la misma hora todos los días  Puede resultarle útil hacer algo relajante antes de acostarse  No gian televisión antes de acostarse  · Consuma alimentos saludables y variados  Los alimentos saludables incluyen frutas, verduras, pan integral, productos lácteos bajos en grasa, frijoles, john magras y pescado  No coma alimentos que le provoquen pankaj de [place]  · Realice actividad física con regularidad  El ejercicio ayuda a aliviar la tensión y los pankaj de [place]  Pida más información acerca de un plan de ejercicio adecuado para usted  · [address] se le haya indicado  Es probable que usted necesite mj más líquido para prevenir la deshidratación  La deshidratación puede empeorar el dolor de temi tensional  Pregúntele al médico cuánto líquido debe mj y qué líquidos son mejores para usted  Limite el consumo de cafeína grant se le haya indicado  La cafeína puede empeorar el dolor de temi tensional     · No consuma alcohol  El alcohol puede desencadenar un dolor de [place]  También puede impedir que los medicamentos detengan hodge dolor de [place]  · No fume  La nicotina y otros químicos en los cigarrillos o cigarros pueden desencadenar un dolor de [place] y [place] pueden provocar daño al pulmón   Pida información a miller médico si usted actualmente fuma y necesita ayuda para dejar de fumar  Los cigarrillos electrónicos o tabaco sin humo todavía contienen nicotina  Consulte con miller médico antes de [employer]  Acuda a roosevelt consultas de control con miller médico según le indicaron  Traiga el registro de pankaj de temi con usted  Anote roosevelt preguntas para que se acuerde de hacerlas keya roosevelt visitas  © 2017 [address]  Information is for End User's use only and may not be sold, redistributed or otherwise used for commercial purposes  All illustrations and images included in CareNotes® are the copyrighted property of A D A M , Inc  or Ren [place]  Esta información es sólo para uso en educación  Miller intención no es darle un consejo médico sobre enfermedades o tratamientos  Colsulte con miller Cayla Poncho farmacéutico antes de seguir cualquier régimen médico para saber si es seguro y efectivo para usted

## 2020-07-28 NOTE — PROGRESS NOTES
INTERNAL MEDICINE OFFICE VISIT  [facility]  [address] [place], [place] [address]    NAME: Angie Goins  AGE: 50 y o  SEX: female    DATE OF ENCOUNTER: 7/28/2020    Assessment and Plan     1  Tension-Type Headache  Patient presents with what appears to be tension type headache with hypertonic trapezius muscles and tenderness to palpation in this region  States that pain starts in her shoulders and wraps around her forehead  Denies any traumatic incidents  Has not tried medications for pain relief  · Tylenol 500 mg q6 hours PRN mild pain  · Endorsed patient to utilize warm compress and massage therapy  · Follow-up as needed    BMI Counseling: Body mass index is 28 68 kg/m²  The BMI is above normal  Nutrition recommendations include decreasing portion sizes, encouraging healthy choices of fruits and vegetables, decreasing fast food intake, consuming healthier snacks, limiting drinks that contain sugar, moderation in carbohydrate intake, increasing intake of lean protein, reducing intake of saturated and trans fat and reducing intake of cholesterol  Exercise recommendations include moderate physical activity 150 minutes/week  Chief Complaint     Headache    History of Present Illness     Patient is a very pleasant 16-year-old female with a past medical history significant for anxiety and depression, bipolar disorder who presents today with acute complaints of neck and head pain  Patient states that she began to experience pain in her neck radiating to her head and rounded to her forehead for the past couple of days  She has not tried any medications to relieve her pain  She denies any traumatic incidents to the region  She has no other complaints at this time  She denies chest pain, shortness of breath, diaphoresis, nausea/vomiting/diarrhea, fevers/chills  Review of Systems     Review of Systems   Constitutional: Negative  HENT: Negative      Eyes: Negative  Respiratory: Negative  Cardiovascular: Negative  Gastrointestinal: Negative  Endocrine: Negative  Genitourinary: Negative  Musculoskeletal: Negative  Skin: Negative  Allergic/Immunologic: Negative  Neurological: Positive for headaches  Hematological: Negative  Psychiatric/Behavioral: Negative  Active Problem List     Patient Active Problem List   Diagnosis    Vitamin D deficiency    Chronic pain disorder    History of kidney stones    Depression with anxiety    Joint pain    Seasonal allergic rhinitis due to pollen    Dermatitis    Chronic bilateral low back pain without sciatica    Fibromyalgia    Oropharyngeal dysphagia    Abnormal finding on mammography    Anxiety    Calculus of kidney    Decreased platelet count (HCC)    Nonintractable headache       Objective     /62 (BP Location: Left arm, Patient Position: Sitting, Cuff Size: Standard)   Pulse 62   Temp (!) 96 7 °F (35 9 °C) (Temporal)   Wt 66 6 kg (146 lb 13 2 oz)   LMP  (LMP Unknown)   BMI 28 68 kg/m²     Physical Exam   Constitutional: She is oriented to person, place, and time  She appears well-developed and well-nourished  No distress  HENT:   Head: Normocephalic and atraumatic  Nose: Nose normal    Mouth/Throat: No oropharyngeal exudate  Eyes: Conjunctivae are normal  Right eye exhibits no discharge  Left eye exhibits no discharge  No scleral icterus  Neck: Neck supple  No JVD present  Cardiovascular: Normal rate, regular rhythm, normal heart sounds and intact distal pulses  Exam reveals no gallop and no friction rub  No murmur heard  Pulmonary/Chest: Effort normal and breath sounds normal  No respiratory distress  She has no wheezes  She has no rales  Abdominal: Soft  Bowel sounds are normal  She exhibits no distension  There is no tenderness  There is no rebound and no guarding  Musculoskeletal: Normal range of motion  She exhibits no edema     Neurological: She is alert and oriented to person, place, and time  Skin: Skin is warm and dry  She is not diaphoretic  No erythema  Psychiatric: She has a normal mood and affect  Current Medications     Current Outpatient Medications:     ALPRAZolam (XANAX) 0 5 mg tablet, Take by mouth 2 (two) times a day as needed, Disp: , Rfl:     buPROPion (WELLBUTRIN XL) 300 mg 24 hr tablet, Take by mouth, Disp: , Rfl:     busPIRone (BUSPAR) 15 mg tablet, Take 15 mg by mouth 2 (two) times a day, Disp: , Rfl:     HYDROcodone-acetaminophen (NORCO) 5-325 mg per tablet, Hydrocodone-Acetaminophen 5-325 MG Oral Tablet TAKE 1 TO 2 TABLETS EVERY 4 TO 6 HOURS AS NEEDED FOR PAIN  Quantity: 15; Refills: 0    Alexis Avalos MD;  Started [id number] Active, Disp: , Rfl:     OLANZapine (ZyPREXA) 20 MG tablet, Take 20 mg by mouth daily at bedtime  , Disp: , Rfl:     pregabalin (LYRICA) 50 mg capsule, Take 50 mg by mouth 3 (three) times a day, Disp: , Rfl:     sertraline (Zoloft) 25 mg tablet, Zoloft 25 MG Oral Tablet  Refills: 0  Active, Disp: , Rfl:     acetaminophen (TYLENOL) 500 mg tablet, Take 1 tablet (500 mg total) by mouth every 6 (six) hours as needed for mild pain, Disp: 30 tablet, Rfl: 0    Cholecalciferol (RA VITAMIN D-3) 50 MCG (2000 UT) CAPS, Take 1 capsule (2,000 Units total) by mouth daily (Patient not taking: Reported on 7/10/2020), Disp: 90 capsule, Rfl: 1    DULoxetine (CYMBALTA) 30 mg delayed release capsule, take 1 capsule by mouth once daily (Patient not taking: Reported on 7/10/2020), Disp: 90 capsule, Rfl: 3    Health Maintenance     Health Maintenance   Topic Date Due    CRC Screening: Colonoscopy  1965    HIV Screening  04/15/1982    BMI: Followup Plan  04/15/1985    MAMMOGRAM  01/03/2020    Influenza Vaccine  07/01/2020    Annual Physical  10/29/2020    BMI: Adult  07/10/2021    DTaP,Tdap,and Td Vaccines (5 - Td) 12/05/2027    Pneumococcal Vaccine: 65+ Years (1 of 2 - PCV13) 04/15/2032    Hepatitis C Screening  Completed    Pneumococcal Vaccine: Pediatrics (0 to 5 Years) and At-Risk Patients (6 to 59 Years)  Aged Out    HIB Vaccine  Aged Out    Hepatitis B Vaccine  Aged Out    IPV Vaccine  Aged Out    Hepatitis A Vaccine  Aged Out    Meningococcal ACWY Vaccine  Aged Out    HPV Vaccine  Aged [employer] History   Administered Date(s) Administered    IPV 1965, 11/30/1984    MMR 11/30/1984    Td (adult), adsorbed 01/30/1984, 11/20/1984, 10/15/1992, 11/02/2004    Tdap 05/06/2011, 12/05/2017       Tyson Zamudio DO  Internal Medicine  PGY-2  7/28/2020 9:03 AM

## 2020-08-17 ENCOUNTER — TELEPHONE (OUTPATIENT)
Dept: INTERNAL MEDICINE CLINIC | Facility: CLINIC | Age: 55
End: 2020-08-17

## 2020-08-17 DIAGNOSIS — M79.7 FIBROMYALGIA: Primary | ICD-10-CM

## 2020-09-17 ENCOUNTER — TELEPHONE (OUTPATIENT)
Dept: INTERNAL MEDICINE CLINIC | Facility: CLINIC | Age: 55
End: 2020-09-17

## 2020-09-17 NOTE — TELEPHONE ENCOUNTER
Jennifer Hendrix Color-  Green     Name of Form- Handicap Parking Space Application    Form to be filled out by- Dr Rey Pearl    Form to be picked up by patient    Patient made aware of 10 business day policy

## 2020-09-23 ENCOUNTER — OFFICE VISIT (OUTPATIENT)
Dept: INTERNAL MEDICINE CLINIC | Facility: CLINIC | Age: 55
End: 2020-09-23

## 2020-09-23 VITALS
WEIGHT: 148.59 LBS | HEART RATE: 72 BPM | DIASTOLIC BLOOD PRESSURE: 62 MMHG | BODY MASS INDEX: 29.17 KG/M2 | SYSTOLIC BLOOD PRESSURE: 104 MMHG | TEMPERATURE: 97 F | OXYGEN SATURATION: 99 % | HEIGHT: 60 IN

## 2020-09-23 DIAGNOSIS — R51.9 NONINTRACTABLE HEADACHE, UNSPECIFIED CHRONICITY PATTERN, UNSPECIFIED HEADACHE TYPE: ICD-10-CM

## 2020-09-23 PROCEDURE — 3008F BODY MASS INDEX DOCD: CPT | Performed by: INTERNAL MEDICINE

## 2020-09-23 PROCEDURE — 1036F TOBACCO NON-USER: CPT | Performed by: INTERNAL MEDICINE

## 2020-09-23 PROCEDURE — 99213 OFFICE O/P EST LOW 20 MIN: CPT | Performed by: INTERNAL MEDICINE

## 2020-09-23 RX ORDER — ACETAMINOPHEN 500 MG
500 TABLET ORAL EVERY 6 HOURS PRN
Qty: 30 TABLET | Refills: 0 | Status: SHIPPED | OUTPATIENT
Start: 2020-09-23 | End: 2020-11-18
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
## 2020-09-23 NOTE — TELEPHONE ENCOUNTER
Advise patient the form is already complete by Dr Stanley  Patient is coming tomorrow to  the form ,copy made to be scan and original in the accordion file under letter "R" for patient

## 2020-10-05 ENCOUNTER — TELEPHONE (OUTPATIENT)
Dept: INTERNAL MEDICINE CLINIC | Facility: CLINIC | Age: 55
End: 2020-10-05

## 2020-10-07 DIAGNOSIS — E55.9 VITAMIN D DEFICIENCY: ICD-10-CM

## 2020-10-09 RX ORDER — GLUCOSAMINE/CHONDR SU A SOD 750-600 MG
TABLET ORAL
Qty: 90 CAPSULE | Refills: 1 | OUTPATIENT
Start: 2020-10-09

## 2020-10-14 RX ORDER — GLUCOSAMINE/CHONDR SU A SOD 750-600 MG
1 TABLET ORAL DAILY
Qty: 90 CAPSULE | Refills: 1 | Status: SHIPPED | OUTPATIENT
Start: 2020-10-14 | End: 2020-11-18

## 2020-11-01 ENCOUNTER — APPOINTMENT (EMERGENCY)
Dept: RADIOLOGY | Facility: HOSPITAL | Age: 55
End: 2020-11-01
Payer: COMMERCIAL

## 2020-11-01 ENCOUNTER — HOSPITAL ENCOUNTER (EMERGENCY)
Facility: HOSPITAL | Age: 55
Discharge: HOME | End: 2020-11-01
Attending: EMERGENCY MEDICINE
Payer: COMMERCIAL

## 2020-11-01 VITALS
HEART RATE: 90 BPM | OXYGEN SATURATION: 100 % | DIASTOLIC BLOOD PRESSURE: 78 MMHG | RESPIRATION RATE: 16 BRPM | TEMPERATURE: 99.3 F | SYSTOLIC BLOOD PRESSURE: 143 MMHG

## 2020-11-01 DIAGNOSIS — S52.572A OTHER CLOSED INTRA-ARTICULAR FRACTURE OF DISTAL END OF LEFT RADIUS, INITIAL ENCOUNTER: ICD-10-CM
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
## 2020-11-01 PROCEDURE — 99284 EMERGENCY DEPT VISIT MOD MDM: CPT | Performed by: EMERGENCY MEDICINE

## 2020-11-01 PROCEDURE — 73110 X-RAY EXAM OF WRIST: CPT

## 2020-11-01 PROCEDURE — 99283 EMERGENCY DEPT VISIT LOW MDM: CPT

## 2020-11-01 PROCEDURE — 29125 APPL SHORT ARM SPLINT STATIC: CPT | Performed by: EMERGENCY MEDICINE

## 2020-11-01 PROCEDURE — 73090 X-RAY EXAM OF FOREARM: CPT

## 2020-11-01 RX ORDER — OXYCODONE HYDROCHLORIDE 5 MG/1
5 TABLET ORAL ONCE
Status: DISCONTINUED | OUTPATIENT
Start: 2020-11-01 | End: 2020-11-01 | Stop reason: HOSPADM

## 2020-11-02 ENCOUNTER — TELEPHONE (OUTPATIENT)
Dept: OBGYN CLINIC | Facility: HOSPITAL | Age: 55
End: 2020-11-02

## 2020-11-06 ENCOUNTER — HOSPITAL ENCOUNTER (OUTPATIENT)
Dept: RADIOLOGY | Facility: HOSPITAL | Age: 55
Discharge: HOME | End: 2020-11-06
Attending: SURGERY
Payer: COMMERCIAL

## 2020-11-06 ENCOUNTER — OFFICE VISIT (OUTPATIENT)
Dept: OBGYN CLINIC | Facility: HOSPITAL | Age: 55
End: 2020-11-06
Payer: COMMERCIAL

## 2020-11-06 ENCOUNTER — ANESTHESIA EVENT (OUTPATIENT)
Dept: PERIOP | Facility: HOSPITAL | Age: 55
End: 2020-11-06
Payer: COMMERCIAL

## 2020-11-06 VITALS
DIASTOLIC BLOOD PRESSURE: 76 MMHG | HEIGHT: 60 IN | HEART RATE: 73 BPM | SYSTOLIC BLOOD PRESSURE: 124 MMHG | WEIGHT: 149 LBS | BODY MASS INDEX: 29.25 KG/M2

## 2020-11-06 DIAGNOSIS — M25.532 LEFT WRIST PAIN: ICD-10-CM

## 2020-11-06 DIAGNOSIS — S52.502A CLOSED FRACTURE OF DISTAL ENDS OF LEFT RADIUS AND ULNA, INITIAL ENCOUNTER: Primary | ICD-10-CM

## 2020-11-06 DIAGNOSIS — S52.602A CLOSED FRACTURE OF DISTAL ENDS OF LEFT RADIUS AND ULNA, INITIAL ENCOUNTER: Primary | ICD-10-CM

## 2020-11-06 PROCEDURE — 1036F TOBACCO NON-USER: CPT | Performed by: SURGERY

## 2020-11-06 PROCEDURE — 73110 X-RAY EXAM OF WRIST: CPT

## 2020-11-06 PROCEDURE — 99204 OFFICE O/P NEW MOD 45 MIN: CPT | Performed by: SURGERY

## 2020-11-06 RX ORDER — ACETAMINOPHEN 160 MG/5ML
15 SOLUTION ORAL EVERY 4 HOURS PRN
COMMUNITY
End: 2020-11-09

## 2020-11-06 RX ORDER — CEFAZOLIN SODIUM 1 G/50ML
1000 SOLUTION INTRAVENOUS ONCE
Status: CANCELLED | OUTPATIENT
Start: 2020-11-06 | End: 2020-11-06

## 2020-11-09 ENCOUNTER — HOSPITAL ENCOUNTER (OUTPATIENT)
Dept: RADIOLOGY | Age: 55
Discharge: HOME | End: 2020-11-09
Payer: COMMERCIAL

## 2020-11-09 DIAGNOSIS — M25.532 LEFT WRIST PAIN: ICD-10-CM

## 2020-11-09 PROCEDURE — 73200 CT UPPER EXTREMITY W/O DYE: CPT

## 2020-11-10 ENCOUNTER — ANESTHESIA (OUTPATIENT)
Dept: PERIOP | Facility: HOSPITAL | Age: 55
End: 2020-11-10
Payer: COMMERCIAL

## 2020-11-13 ENCOUNTER — HOSPITAL ENCOUNTER (OUTPATIENT)
Facility: HOSPITAL | Age: 55
Setting detail: OUTPATIENT SURGERY
Discharge: HOME | End: 2020-11-13
Attending: SURGERY | Admitting: SURGERY
Payer: COMMERCIAL

## 2020-11-13 ENCOUNTER — HOSPITAL ENCOUNTER (OUTPATIENT)
Dept: RADIOLOGY | Facility: HOSPITAL | Age: 55
Setting detail: OUTPATIENT SURGERY
Discharge: HOME | End: 2020-11-13
Payer: COMMERCIAL

## 2020-11-13 VITALS
SYSTOLIC BLOOD PRESSURE: 117 MMHG | BODY MASS INDEX: 29.25 KG/M2 | HEART RATE: 94 BPM | RESPIRATION RATE: 16 BRPM | DIASTOLIC BLOOD PRESSURE: 72 MMHG | HEIGHT: 60 IN | WEIGHT: 149 LBS | OXYGEN SATURATION: 99 % | TEMPERATURE: 97.2 F

## 2020-11-13 VITALS — HEART RATE: 97 BPM

## 2020-11-13 DIAGNOSIS — Z47.89 AFTERCARE FOLLOWING SURGERY OF THE MUSCULOSKELETAL SYSTEM: ICD-10-CM

## 2020-11-13 DIAGNOSIS — S52.602A CLOSED FRACTURE OF DISTAL ENDS OF LEFT RADIUS AND ULNA, INITIAL ENCOUNTER: ICD-10-CM

## 2020-11-13 DIAGNOSIS — S52.602D CLOSED FRACTURE OF DISTAL ENDS OF LEFT RADIUS AND ULNA WITH ROUTINE HEALING, SUBSEQUENT ENCOUNTER: Primary | ICD-10-CM

## 2020-11-13 DIAGNOSIS — S52.502A CLOSED FRACTURE OF DISTAL ENDS OF LEFT RADIUS AND ULNA, INITIAL ENCOUNTER: ICD-10-CM

## 2020-11-13 DIAGNOSIS — S52.502D CLOSED FRACTURE OF DISTAL ENDS OF LEFT RADIUS AND ULNA WITH ROUTINE HEALING, SUBSEQUENT ENCOUNTER: Primary | ICD-10-CM

## 2020-11-13 PROCEDURE — 25609 OPTX DST RD XART FX/EP SEP3+: CPT | Performed by: PHYSICIAN ASSISTANT

## 2020-11-13 PROCEDURE — C1713 ANCHOR/SCREW BN/BN,TIS/BN: HCPCS | Performed by: SURGERY

## 2020-11-13 PROCEDURE — 77071 MNL APPL STRS JT RADIOGRAPHY: CPT | Performed by: SURGERY

## 2020-11-13 PROCEDURE — 73110 X-RAY EXAM OF WRIST: CPT

## 2020-11-13 PROCEDURE — 64721 CARPAL TUNNEL SURGERY: CPT | Performed by: PHYSICIAN ASSISTANT

## 2020-11-13 PROCEDURE — 64721 CARPAL TUNNEL SURGERY: CPT | Performed by: SURGERY

## 2020-11-13 PROCEDURE — 25609 OPTX DST RD XART FX/EP SEP3+: CPT | Performed by: SURGERY

## 2020-11-13 DEVICE — IMPLANTABLE DEVICE: Type: IMPLANTABLE DEVICE | Site: WRIST | Status: FUNCTIONAL

## 2020-11-13 DEVICE — SCREW CORTICAL 2.3 X 16MM: Type: IMPLANTABLE DEVICE | Site: WRIST | Status: FUNCTIONAL

## 2020-11-13 RX ORDER — SODIUM CHLORIDE, SODIUM LACTATE, POTASSIUM CHLORIDE, CALCIUM CHLORIDE 600; 310; 30; 20 MG/100ML; MG/100ML; MG/100ML; MG/100ML
INJECTION, SOLUTION INTRAVENOUS CONTINUOUS PRN
Status: DISCONTINUED | OUTPATIENT
Start: 2020-11-13 | End: 2020-11-13

## 2020-11-13 RX ORDER — ROPIVACAINE HYDROCHLORIDE 2 MG/ML
INJECTION, SOLUTION EPIDURAL; INFILTRATION; PERINEURAL
Status: DISCONTINUED | OUTPATIENT
Start: 2020-11-13 | End: 2020-11-13

## 2020-11-13 RX ORDER — PROPOFOL 10 MG/ML
INJECTION, EMULSION INTRAVENOUS AS NEEDED
Status: DISCONTINUED | OUTPATIENT
Start: 2020-11-13 | End: 2020-11-13

## 2020-11-13 RX ORDER — MIDAZOLAM HYDROCHLORIDE 2 MG/2ML
2 INJECTION, SOLUTION INTRAMUSCULAR; INTRAVENOUS ONCE
Status: DISCONTINUED | OUTPATIENT
Start: 2020-11-13 | End: 2020-11-13 | Stop reason: HOSPADM

## 2020-11-13 RX ORDER — EPHEDRINE SULFATE 50 MG/ML
INJECTION INTRAVENOUS AS NEEDED
Status: DISCONTINUED | OUTPATIENT
Start: 2020-11-13 | End: 2020-11-13

## 2020-11-13 RX ORDER — CEFAZOLIN SODIUM 1 G/50ML
1000 SOLUTION INTRAVENOUS ONCE
Status: COMPLETED | OUTPATIENT
Start: 2020-11-13 | End: 2020-11-13

## 2020-11-13 RX ORDER — MAGNESIUM HYDROXIDE 1200 MG/15ML
LIQUID ORAL AS NEEDED
Status: DISCONTINUED | OUTPATIENT
Start: 2020-11-13 | End: 2020-11-13 | Stop reason: HOSPADM

## 2020-11-13 RX ORDER — FENTANYL CITRATE 50 UG/ML
INJECTION, SOLUTION INTRAMUSCULAR; INTRAVENOUS AS NEEDED
Status: DISCONTINUED | OUTPATIENT
Start: 2020-11-13 | End: 2020-11-13

## 2020-11-13 RX ORDER — FENTANYL CITRATE/PF 50 MCG/ML
25 SYRINGE (ML) INJECTION
Status: DISCONTINUED | OUTPATIENT
Start: 2020-11-13 | End: 2020-11-13 | Stop reason: HOSPADM

## 2020-11-13 RX ORDER — ONDANSETRON 2 MG/ML
4 INJECTION INTRAMUSCULAR; INTRAVENOUS ONCE AS NEEDED
Status: DISCONTINUED | OUTPATIENT
Start: 2020-11-13 | End: 2020-11-13 | Stop reason: HOSPADM

## 2020-11-13 RX ORDER — SODIUM CHLORIDE, SODIUM LACTATE, POTASSIUM CHLORIDE, CALCIUM CHLORIDE 600; 310; 30; 20 MG/100ML; MG/100ML; MG/100ML; MG/100ML
100 INJECTION, SOLUTION INTRAVENOUS CONTINUOUS
Status: DISCONTINUED | OUTPATIENT
Start: 2020-11-13 | End: 2020-11-13 | Stop reason: HOSPADM

## 2020-11-13 RX ORDER — OXYCODONE HYDROCHLORIDE 5 MG/1
5 TABLET ORAL EVERY 4 HOURS PRN
Status: DISCONTINUED | OUTPATIENT
Start: 2020-11-13 | End: 2020-11-13 | Stop reason: HOSPADM

## 2020-11-13 RX ORDER — LIDOCAINE HYDROCHLORIDE 10 MG/ML
INJECTION, SOLUTION EPIDURAL; INFILTRATION; INTRACAUDAL; PERINEURAL AS NEEDED
Status: DISCONTINUED | OUTPATIENT
Start: 2020-11-13 | End: 2020-11-13

## 2020-11-13 RX ORDER — HYDROCODONE BITARTRATE AND ACETAMINOPHEN 5; 325 MG/1; MG/1
1 TABLET ORAL EVERY 6 HOURS PRN
Qty: 24 TABLET | Refills: 0 | Status: SHIPPED | OUTPATIENT
Start: 2020-11-13 | End: 2020-11-23

## 2020-11-13 RX ORDER — SODIUM CHLORIDE, SODIUM LACTATE, POTASSIUM CHLORIDE, CALCIUM CHLORIDE 600; 310; 30; 20 MG/100ML; MG/100ML; MG/100ML; MG/100ML
125 INJECTION, SOLUTION INTRAVENOUS CONTINUOUS
Status: DISCONTINUED | OUTPATIENT
Start: 2020-11-13 | End: 2020-11-13 | Stop reason: HOSPADM

## 2020-11-13 RX ORDER — ONDANSETRON 2 MG/ML
INJECTION INTRAMUSCULAR; INTRAVENOUS AS NEEDED
Status: DISCONTINUED | OUTPATIENT
Start: 2020-11-13 | End: 2020-11-13

## 2020-11-13 RX ORDER — OXYCODONE HYDROCHLORIDE 5 MG/1
10 TABLET ORAL EVERY 4 HOURS PRN
Status: DISCONTINUED | OUTPATIENT
Start: 2020-11-13 | End: 2020-11-13 | Stop reason: HOSPADM

## 2020-11-13 RX ADMIN — EPHEDRINE SULFATE 10 MG: 50 INJECTION, SOLUTION INTRAVENOUS at 10:17

## 2020-11-13 RX ADMIN — PHENYLEPHRINE HYDROCHLORIDE 200 MCG: 10 INJECTION INTRAVENOUS at 10:29

## 2020-11-13 RX ADMIN — ROPIVACAINE HYDROCHLORIDE 25 ML: 2 INJECTION, SOLUTION EPIDURAL; INFILTRATION at 19:08

## 2020-11-13 RX ADMIN — PHENYLEPHRINE HYDROCHLORIDE 200 MCG: 10 INJECTION INTRAVENOUS at 09:48

## 2020-11-13 RX ADMIN — PHENYLEPHRINE HYDROCHLORIDE 200 MCG: 10 INJECTION INTRAVENOUS at 09:58

## 2020-11-13 RX ADMIN — FENTANYL CITRATE 50 MCG: 50 INJECTION, SOLUTION INTRAMUSCULAR; INTRAVENOUS at 09:48

## 2020-11-13 RX ADMIN — SODIUM CHLORIDE, SODIUM LACTATE, POTASSIUM CHLORIDE, AND CALCIUM CHLORIDE: .6; .31; .03; .02 INJECTION, SOLUTION INTRAVENOUS at 09:26

## 2020-11-13 RX ADMIN — PROPOFOL 150 MG: 10 INJECTION, EMULSION INTRAVENOUS at 09:28

## 2020-11-13 RX ADMIN — CEFAZOLIN SODIUM 1000 MG: 1 SOLUTION INTRAVENOUS at 09:26

## 2020-11-13 RX ADMIN — EPHEDRINE SULFATE 10 MG: 50 INJECTION, SOLUTION INTRAVENOUS at 09:58

## 2020-11-13 RX ADMIN — PHENYLEPHRINE HYDROCHLORIDE 200 MCG: 10 INJECTION INTRAVENOUS at 09:53

## 2020-11-13 RX ADMIN — SODIUM CHLORIDE, SODIUM LACTATE, POTASSIUM CHLORIDE, AND CALCIUM CHLORIDE 125 ML/HR: .6; .31; .03; .02 INJECTION, SOLUTION INTRAVENOUS at 08:47

## 2020-11-13 RX ADMIN — ONDANSETRON 4 MG: 2 INJECTION INTRAMUSCULAR; INTRAVENOUS at 09:53

## 2020-11-13 RX ADMIN — FENTANYL CITRATE 50 MCG: 50 INJECTION, SOLUTION INTRAMUSCULAR; INTRAVENOUS at 09:44

## 2020-11-13 RX ADMIN — PHENYLEPHRINE HYDROCHLORIDE 200 MCG: 10 INJECTION INTRAVENOUS at 10:11

## 2020-11-13 RX ADMIN — LIDOCAINE HYDROCHLORIDE 50 MG: 10 INJECTION, SOLUTION EPIDURAL; INFILTRATION; INTRACAUDAL; PERINEURAL at 09:28

## 2020-11-13 RX ADMIN — PHENYLEPHRINE HYDROCHLORIDE 30 MCG/MIN: 10 INJECTION INTRAVENOUS at 10:29

## 2020-11-13 RX ADMIN — FENTANYL CITRATE 50 MCG: 50 INJECTION, SOLUTION INTRAMUSCULAR; INTRAVENOUS at 11:48

## 2020-11-18 ENCOUNTER — TELEPHONE (OUTPATIENT)
Dept: INTERNAL MEDICINE CLINIC | Facility: CLINIC | Age: 55
End: 2020-11-18

## 2020-11-18 ENCOUNTER — OFFICE VISIT (OUTPATIENT)
Dept: INTERNAL MEDICINE CLINIC | Facility: CLINIC | Age: 55
End: 2020-11-18

## 2020-11-18 ENCOUNTER — ANTICOAG VISIT (OUTPATIENT)
Dept: INTERNAL MEDICINE CLINIC | Facility: CLINIC | Age: 55
End: 2020-11-18

## 2020-11-18 VITALS
WEIGHT: 149 LBS | BODY MASS INDEX: 29.1 KG/M2 | DIASTOLIC BLOOD PRESSURE: 62 MMHG | SYSTOLIC BLOOD PRESSURE: 110 MMHG | OXYGEN SATURATION: 99 % | HEART RATE: 84 BPM | TEMPERATURE: 97.6 F

## 2020-11-18 DIAGNOSIS — Z00.00 ROUTINE GENERAL MEDICAL EXAMINATION AT HEALTH CARE FACILITY: Primary | ICD-10-CM

## 2020-11-18 DIAGNOSIS — S52.602D CLOSED FRACTURE OF DISTAL ENDS OF LEFT RADIUS AND ULNA WITH ROUTINE HEALING, SUBSEQUENT ENCOUNTER: ICD-10-CM

## 2020-11-18 DIAGNOSIS — S52.502D CLOSED FRACTURE OF DISTAL ENDS OF LEFT RADIUS AND ULNA WITH ROUTINE HEALING, SUBSEQUENT ENCOUNTER: ICD-10-CM

## 2020-11-18 DIAGNOSIS — Z12.31 ENCOUNTER FOR SCREENING MAMMOGRAM FOR BREAST CANCER: ICD-10-CM

## 2020-11-18 PROCEDURE — 1036F TOBACCO NON-USER: CPT | Performed by: INTERNAL MEDICINE

## 2020-11-18 PROCEDURE — 99213 OFFICE O/P EST LOW 20 MIN: CPT | Performed by: INTERNAL MEDICINE

## 2020-11-20 NOTE — TELEPHONE ENCOUNTER
Form placed in [address] clerical folder        Patient came in for appt on 11/18/2020 with a new copy of her handicap parking space application  It was previously filled out on 9/24/2020 for her but she wanted a new one done stating she cannot walk 200 feet   Form was redone by Dr Nury Andino

## 2020-11-25 ENCOUNTER — APPOINTMENT (OUTPATIENT)
Dept: RADIOLOGY | Facility: AMBULARY SURGERY CENTER | Age: 55
End: 2020-11-25
Attending: SURGERY
Payer: COMMERCIAL

## 2020-11-25 ENCOUNTER — OFFICE VISIT (OUTPATIENT)
Dept: OBGYN CLINIC | Facility: CLINIC | Age: 55
End: 2020-11-25

## 2020-11-25 VITALS
BODY MASS INDEX: 29.25 KG/M2 | WEIGHT: 149 LBS | HEART RATE: 86 BPM | DIASTOLIC BLOOD PRESSURE: 83 MMHG | HEIGHT: 60 IN | SYSTOLIC BLOOD PRESSURE: 138 MMHG

## 2020-11-25 DIAGNOSIS — S52.502A CLOSED FRACTURE OF DISTAL ENDS OF LEFT RADIUS AND ULNA, INITIAL ENCOUNTER: ICD-10-CM

## 2020-11-25 DIAGNOSIS — S52.502A CLOSED FRACTURE OF DISTAL ENDS OF LEFT RADIUS AND ULNA, INITIAL ENCOUNTER: Primary | ICD-10-CM

## 2020-11-25 DIAGNOSIS — S52.602A CLOSED FRACTURE OF DISTAL ENDS OF LEFT RADIUS AND ULNA, INITIAL ENCOUNTER: ICD-10-CM

## 2020-11-25 DIAGNOSIS — Z87.81 S/P ORIF (OPEN REDUCTION INTERNAL FIXATION) FRACTURE: ICD-10-CM

## 2020-11-25 DIAGNOSIS — S52.602A CLOSED FRACTURE OF DISTAL ENDS OF LEFT RADIUS AND ULNA, INITIAL ENCOUNTER: Primary | ICD-10-CM

## 2020-11-25 DIAGNOSIS — Z98.890 S/P ORIF (OPEN REDUCTION INTERNAL FIXATION) FRACTURE: ICD-10-CM

## 2020-11-25 PROCEDURE — 73110 X-RAY EXAM OF WRIST: CPT

## 2020-11-25 PROCEDURE — 3008F BODY MASS INDEX DOCD: CPT | Performed by: INTERNAL MEDICINE

## 2020-11-25 PROCEDURE — 99024 POSTOP FOLLOW-UP VISIT: CPT | Performed by: SURGERY

## 2020-11-25 PROCEDURE — 3008F BODY MASS INDEX DOCD: CPT | Performed by: SURGERY

## 2020-12-07 ENCOUNTER — EVALUATION (OUTPATIENT)
Dept: PHYSICAL THERAPY | Age: 55
End: 2020-12-07
Payer: COMMERCIAL

## 2020-12-07 DIAGNOSIS — S52.502D CLOSED FRACTURE OF DISTAL ENDS OF LEFT RADIUS AND ULNA WITH ROUTINE HEALING, SUBSEQUENT ENCOUNTER: Primary | ICD-10-CM

## 2020-12-07 DIAGNOSIS — S52.602D CLOSED FRACTURE OF DISTAL ENDS OF LEFT RADIUS AND ULNA WITH ROUTINE HEALING, SUBSEQUENT ENCOUNTER: Primary | ICD-10-CM

## 2020-12-07 PROCEDURE — 97110 THERAPEUTIC EXERCISES: CPT | Performed by: PHYSICAL THERAPIST

## 2020-12-07 PROCEDURE — 97162 PT EVAL MOD COMPLEX 30 MIN: CPT | Performed by: PHYSICAL THERAPIST

## 2020-12-10 ENCOUNTER — OFFICE VISIT (OUTPATIENT)
Dept: OBGYN CLINIC | Facility: CLINIC | Age: 55
End: 2020-12-10

## 2020-12-10 VITALS
HEIGHT: 60 IN | SYSTOLIC BLOOD PRESSURE: 115 MMHG | DIASTOLIC BLOOD PRESSURE: 77 MMHG | BODY MASS INDEX: 29.06 KG/M2 | WEIGHT: 148 LBS | HEART RATE: 78 BPM

## 2020-12-10 DIAGNOSIS — Z98.890 S/P ORIF (OPEN REDUCTION INTERNAL FIXATION) FRACTURE: ICD-10-CM

## 2020-12-10 DIAGNOSIS — S52.602D CLOSED FRACTURE OF DISTAL ENDS OF LEFT RADIUS AND ULNA WITH ROUTINE HEALING, SUBSEQUENT ENCOUNTER: Primary | ICD-10-CM

## 2020-12-10 DIAGNOSIS — S52.502D CLOSED FRACTURE OF DISTAL ENDS OF LEFT RADIUS AND ULNA WITH ROUTINE HEALING, SUBSEQUENT ENCOUNTER: Primary | ICD-10-CM

## 2020-12-10 DIAGNOSIS — Z87.81 S/P ORIF (OPEN REDUCTION INTERNAL FIXATION) FRACTURE: ICD-10-CM

## 2020-12-10 PROCEDURE — 99024 POSTOP FOLLOW-UP VISIT: CPT | Performed by: SURGERY

## 2020-12-10 PROCEDURE — 3008F BODY MASS INDEX DOCD: CPT | Performed by: INTERNAL MEDICINE

## 2020-12-10 PROCEDURE — 3008F BODY MASS INDEX DOCD: CPT | Performed by: SURGERY

## 2020-12-14 ENCOUNTER — OFFICE VISIT (OUTPATIENT)
Dept: PHYSICAL THERAPY | Age: 55
End: 2020-12-14
Payer: COMMERCIAL

## 2020-12-14 DIAGNOSIS — S52.602D CLOSED FRACTURE OF DISTAL ENDS OF LEFT RADIUS AND ULNA WITH ROUTINE HEALING, SUBSEQUENT ENCOUNTER: Primary | ICD-10-CM

## 2020-12-14 DIAGNOSIS — S52.502D CLOSED FRACTURE OF DISTAL ENDS OF LEFT RADIUS AND ULNA WITH ROUTINE HEALING, SUBSEQUENT ENCOUNTER: Primary | ICD-10-CM

## 2020-12-14 PROCEDURE — 97110 THERAPEUTIC EXERCISES: CPT | Performed by: PHYSICAL THERAPIST

## 2020-12-14 PROCEDURE — 97140 MANUAL THERAPY 1/> REGIONS: CPT | Performed by: PHYSICAL THERAPIST

## 2020-12-21 ENCOUNTER — OFFICE VISIT (OUTPATIENT)
Dept: PHYSICAL THERAPY | Age: 55
End: 2020-12-21
Payer: COMMERCIAL

## 2020-12-21 DIAGNOSIS — S52.602D CLOSED FRACTURE OF DISTAL ENDS OF LEFT RADIUS AND ULNA WITH ROUTINE HEALING, SUBSEQUENT ENCOUNTER: Primary | ICD-10-CM

## 2020-12-21 DIAGNOSIS — S52.502D CLOSED FRACTURE OF DISTAL ENDS OF LEFT RADIUS AND ULNA WITH ROUTINE HEALING, SUBSEQUENT ENCOUNTER: Primary | ICD-10-CM

## 2020-12-21 PROCEDURE — 97140 MANUAL THERAPY 1/> REGIONS: CPT | Performed by: PHYSICAL THERAPIST

## 2020-12-21 PROCEDURE — 97110 THERAPEUTIC EXERCISES: CPT | Performed by: PHYSICAL THERAPIST

## 2020-12-23 ENCOUNTER — OFFICE VISIT (OUTPATIENT)
Dept: PHYSICAL THERAPY | Age: 55
End: 2020-12-23
Payer: COMMERCIAL

## 2020-12-23 DIAGNOSIS — S52.602D CLOSED FRACTURE OF DISTAL ENDS OF LEFT RADIUS AND ULNA WITH ROUTINE HEALING, SUBSEQUENT ENCOUNTER: Primary | ICD-10-CM

## 2020-12-23 DIAGNOSIS — S52.502D CLOSED FRACTURE OF DISTAL ENDS OF LEFT RADIUS AND ULNA WITH ROUTINE HEALING, SUBSEQUENT ENCOUNTER: Primary | ICD-10-CM

## 2020-12-23 PROCEDURE — 97110 THERAPEUTIC EXERCISES: CPT | Performed by: PHYSICAL THERAPIST

## 2020-12-23 PROCEDURE — 97140 MANUAL THERAPY 1/> REGIONS: CPT | Performed by: PHYSICAL THERAPIST

## 2020-12-24 ENCOUNTER — OFFICE VISIT (OUTPATIENT)
Dept: PHYSICAL THERAPY | Age: 55
End: 2020-12-24
Payer: COMMERCIAL

## 2020-12-24 DIAGNOSIS — S52.502D CLOSED FRACTURE OF DISTAL ENDS OF LEFT RADIUS AND ULNA WITH ROUTINE HEALING, SUBSEQUENT ENCOUNTER: Primary | ICD-10-CM

## 2020-12-24 DIAGNOSIS — S52.602D CLOSED FRACTURE OF DISTAL ENDS OF LEFT RADIUS AND ULNA WITH ROUTINE HEALING, SUBSEQUENT ENCOUNTER: Primary | ICD-10-CM

## 2020-12-24 PROCEDURE — 97140 MANUAL THERAPY 1/> REGIONS: CPT | Performed by: PHYSICAL THERAPIST

## 2020-12-24 PROCEDURE — 97110 THERAPEUTIC EXERCISES: CPT | Performed by: PHYSICAL THERAPIST

## 2020-12-28 ENCOUNTER — OFFICE VISIT (OUTPATIENT)
Dept: PHYSICAL THERAPY | Age: 55
End: 2020-12-28
Payer: COMMERCIAL

## 2020-12-28 DIAGNOSIS — S52.502D CLOSED FRACTURE OF DISTAL ENDS OF LEFT RADIUS AND ULNA WITH ROUTINE HEALING, SUBSEQUENT ENCOUNTER: Primary | ICD-10-CM

## 2020-12-28 DIAGNOSIS — S52.602D CLOSED FRACTURE OF DISTAL ENDS OF LEFT RADIUS AND ULNA WITH ROUTINE HEALING, SUBSEQUENT ENCOUNTER: Primary | ICD-10-CM

## 2020-12-28 PROCEDURE — 97140 MANUAL THERAPY 1/> REGIONS: CPT | Performed by: PHYSICAL THERAPIST

## 2020-12-29 ENCOUNTER — OFFICE VISIT (OUTPATIENT)
Dept: PHYSICAL THERAPY | Age: 55
End: 2020-12-29
Payer: COMMERCIAL

## 2020-12-29 DIAGNOSIS — S52.602D CLOSED FRACTURE OF DISTAL ENDS OF LEFT RADIUS AND ULNA WITH ROUTINE HEALING, SUBSEQUENT ENCOUNTER: Primary | ICD-10-CM

## 2020-12-29 DIAGNOSIS — S52.502D CLOSED FRACTURE OF DISTAL ENDS OF LEFT RADIUS AND ULNA WITH ROUTINE HEALING, SUBSEQUENT ENCOUNTER: Primary | ICD-10-CM

## 2020-12-29 PROCEDURE — 97110 THERAPEUTIC EXERCISES: CPT | Performed by: PHYSICAL THERAPIST

## 2020-12-29 PROCEDURE — 97140 MANUAL THERAPY 1/> REGIONS: CPT | Performed by: PHYSICAL THERAPIST

## 2020-12-31 ENCOUNTER — OFFICE VISIT (OUTPATIENT)
Dept: PHYSICAL THERAPY | Age: 55
End: 2020-12-31
Payer: COMMERCIAL

## 2020-12-31 DIAGNOSIS — S52.502D CLOSED FRACTURE OF DISTAL ENDS OF LEFT RADIUS AND ULNA WITH ROUTINE HEALING, SUBSEQUENT ENCOUNTER: Primary | ICD-10-CM

## 2020-12-31 DIAGNOSIS — S52.602D CLOSED FRACTURE OF DISTAL ENDS OF LEFT RADIUS AND ULNA WITH ROUTINE HEALING, SUBSEQUENT ENCOUNTER: Primary | ICD-10-CM

## 2020-12-31 PROCEDURE — 97140 MANUAL THERAPY 1/> REGIONS: CPT | Performed by: PHYSICAL THERAPIST

## 2021-01-04 ENCOUNTER — OFFICE VISIT (OUTPATIENT)
Dept: PHYSICAL THERAPY | Age: 56
End: 2021-01-04
Payer: COMMERCIAL

## 2021-01-04 DIAGNOSIS — S52.602D CLOSED FRACTURE OF DISTAL ENDS OF LEFT RADIUS AND ULNA WITH ROUTINE HEALING, SUBSEQUENT ENCOUNTER: Primary | ICD-10-CM

## 2021-01-04 DIAGNOSIS — S52.502D CLOSED FRACTURE OF DISTAL ENDS OF LEFT RADIUS AND ULNA WITH ROUTINE HEALING, SUBSEQUENT ENCOUNTER: Primary | ICD-10-CM

## 2021-01-04 PROCEDURE — 97140 MANUAL THERAPY 1/> REGIONS: CPT | Performed by: PHYSICAL THERAPIST

## 2021-01-04 NOTE — PROGRESS NOTES
Daily Note     Today's date: 2021  Patient name: Anca Ryan  : 1965  MRN: [id number]  Referring provider: Caroline Urena MD  Dx:   Encounter Diagnosis     ICD-10-CM    1  Closed fracture of distal ends of left radius and ulna with routine healing, subsequent encounter  S52 502D     S52 602D                   Subjective: Patient reports that her hand was swollen  morning, but has since gone down  No increase in pain  Feels very tight in wrist and hand  Objective: See treatment diary below    Wrist extension PROM 70*, AROM 50*  Wrist flexion PROM 60*  Wrist supination PROM 70*    Assessment: Patient has been compliant with HEP at home and is making progress with finger function and use of the hand at home with various activities (ie dressing, washing dishes, combing her hair)  Focusing on progressing range  Patient able to tolerate progressive weight bearing today, reduced height, improved wrist extension with cuing and facilitation from PT for elbow extension during weight bearing  Plan: Continue per plan of care  Precautions: L wrist fx with ORIF of the distal radius  AROM only L wrist x 2 weeks, then PROM  A/PROM L fingers to tolerance      Manuals    PROM when ready x60' x45 flex, ext; supination Progressive passive ROM  Flexion, extension and supination of the wrist   x30' x30' x50'   Scar massage Good scar mobility  Graston x 15 - flexors, extensors, anterior wrist and palm of the hand x15'            Thumb stretching In to opposition  Too painful to tolerate Finkelstein's test position  No k tape today, able to fully oppose thumb to 5th digit       Wrist joint mobs Grade II AP and PA at the wrist 10x ea 10x ea 10x ea Grade II and Grade III 10x Grade II                                            Neuro Re-Ed                                                                Ther Ex        Wrist AROM 10x AA with supination only today 10x AA flex, ext and supination with 2# wt 10x AA supination only 10x AA with supination with hammer- using weight for added stretch 10x AA    Finger AROM Doing better, working on at home  WB extension ROM stretch- standing 10x:10-20 sec 2x10 :10 stretch, hand pressing in to pillow    Finger PROM- self No longer needs  Extension ROM stretch- with ball, WB 10x:10-20 sec  With ball initially 10x     Reviewed prayer stretch with elbows on the table                                                 Ther Activity                        Gait Training                        Modalities         MH x 10', seated MH x10'  x 5'

## 2021-01-05 ENCOUNTER — OFFICE VISIT (OUTPATIENT)
Dept: PHYSICAL THERAPY | Age: 56
End: 2021-01-05
Payer: COMMERCIAL

## 2021-01-05 DIAGNOSIS — S52.502D CLOSED FRACTURE OF DISTAL ENDS OF LEFT RADIUS AND ULNA WITH ROUTINE HEALING, SUBSEQUENT ENCOUNTER: Primary | ICD-10-CM

## 2021-01-05 DIAGNOSIS — S52.602D CLOSED FRACTURE OF DISTAL ENDS OF LEFT RADIUS AND ULNA WITH ROUTINE HEALING, SUBSEQUENT ENCOUNTER: Primary | ICD-10-CM

## 2021-01-05 PROCEDURE — 97140 MANUAL THERAPY 1/> REGIONS: CPT | Performed by: PHYSICAL THERAPIST

## 2021-01-05 NOTE — PROGRESS NOTES
Daily Note     Today's date: 2021  Patient name: Kristen Pichardo  : 1965  MRN: [id number]  Referring provider: Marj Valerio MD  Dx:   Encounter Diagnosis     ICD-10-CM    1  Closed fracture of distal ends of left radius and ulna with routine healing, subsequent encounter  S52 502D     S52 602D                   Subjective: Patient reports that she's been doing her exercises at home, massaging her hand daily  Reports that she was able to comb and put her hair up today with less difficulty than previous days  Objective: See treatment diary below      Assessment: Focusing on joint mobs, Graston and manual work to regain motion  Scheduled for PT prior to MD appt  Making progress with PROM  Still lacks active movement, improved finger and hand use, decrease in hand swelling  Compliant with HEP and is making steady progress  Good tolerance to weightbearing stretches today, improved form from yesterday  Plan: Continue per plan of care  Precautions: L wrist fx with ORIF of the distal radius  AROM only L wrist x 2 weeks, then PROM  A/PROM L fingers to tolerance      Manuals    PROM when ready x60' x45 flex, ext; supination Progressive passive ROM  Flexion, extension and supination of the wrist   x30' x30' x50   Scar massage Good scar mobility  Graston x 15 - flexors, extensors, anterior wrist and palm of the hand x15' x10'           Thumb stretching In to opposition  Too painful to tolerate Finkelstein's test position  No k tape today, able to fully oppose thumb to 5th digit       Wrist joint mobs Grade II AP and PA at the wrist 10x ea 10x ea 10x ea Grade II and Grade III 10x Grade II Throughout PROM today, Grade I, II, III to improve range                                           Neuro Re-Ed                                                                Ther Ex        Wrist AROM 10x AA with supination only today 10x AA flex, ext and supination with 2# wt 10x AA supination only 10x AA with supination with hammer- using weight for added stretch -   Finger AROM Doing better, working on at home  WB extension ROM stretch- standing 10x:10-20 sec 2x10 :10 stretch, hand pressing in to pillow 2x10   Finger PROM- self No longer needs  Extension ROM stretch- with ball, WB 10x:10-20 sec  With ball initially 10x NT    Reviewed prayer stretch with elbows on the table                                                 Ther Activity                        Gait Training                        Modalities         MH x 10', seated MH x10' MH x 5'  MH x 10 unbilled

## 2021-01-07 ENCOUNTER — OFFICE VISIT (OUTPATIENT)
Dept: OBGYN CLINIC | Facility: CLINIC | Age: 56
End: 2021-01-07

## 2021-01-07 ENCOUNTER — OFFICE VISIT (OUTPATIENT)
Dept: PHYSICAL THERAPY | Age: 56
End: 2021-01-07
Payer: COMMERCIAL

## 2021-01-07 VITALS
SYSTOLIC BLOOD PRESSURE: 131 MMHG | DIASTOLIC BLOOD PRESSURE: 74 MMHG | HEIGHT: 60 IN | WEIGHT: 148 LBS | HEART RATE: 67 BPM | BODY MASS INDEX: 29.06 KG/M2

## 2021-01-07 DIAGNOSIS — S52.602D CLOSED FRACTURE OF DISTAL ENDS OF LEFT RADIUS AND ULNA WITH ROUTINE HEALING, SUBSEQUENT ENCOUNTER: Primary | ICD-10-CM

## 2021-01-07 DIAGNOSIS — S52.502D CLOSED FRACTURE OF DISTAL ENDS OF LEFT RADIUS AND ULNA WITH ROUTINE HEALING, SUBSEQUENT ENCOUNTER: Primary | ICD-10-CM

## 2021-01-07 PROCEDURE — 97140 MANUAL THERAPY 1/> REGIONS: CPT | Performed by: PHYSICAL THERAPIST

## 2021-01-07 PROCEDURE — 97110 THERAPEUTIC EXERCISES: CPT | Performed by: PHYSICAL THERAPIST

## 2021-01-07 PROCEDURE — 99024 POSTOP FOLLOW-UP VISIT: CPT | Performed by: SURGERY

## 2021-01-07 NOTE — PROGRESS NOTES
PT Re-Evaluation     Today's date: 2021  Patient name: Anuel Osei  : 1965  MRN: [id number]  Referring provider: Avleino Mcfadden MD  Dx:   Encounter Diagnosis     ICD-10-CM    1  Closed fracture of distal ends of left radius and ulna with routine healing, subsequent encounter  S52 502D     S52 602D                   Assessment  Assessment details: Patient seen for PT re-assessment  Patient presents with improved PROM of the L wrist  Patient has been compliant with HEP and is starting to use hand more in the home with functional and daily activities (ie chores, sweeping)  Patient does report swelling in the hand at night- will discuss with MD  PT focusing on regaining passive movement, then will establish and increase AROM  Patient has just been able to tolerate weightbearing exercises to improve extension ROM  Patient still quite limited with PROM in to flexion; will continue to work on all ROM and strengthening of hand/wrist for patient to regain function in hand  Impairments: abnormal muscle tone, abnormal or restricted ROM, activity intolerance, impaired physical strength, lacks appropriate home exercise program, pain with function and weight-bearing intolerance  Functional limitations: Patient reports moderate limitations use of L UE since surgery as patient is fearful of movement  Understanding of Dx/Px/POC: good   Prognosis: good    Goals  Impairment Goals to be met within 4 weeks  - Decrease pain to 5/10 at rest/with activity met  - Improve ROM by 5-10 degrees wrist  met  - Return full finger AROM met- tight at thumb PIP  - Increase strength to 3+/5 throughout L wrist not met  - Improve shoulder strength to 4/5  Not met    Added:  - increase flexion ROM to 60* passively  - Increase extension AROM to 60*  - Increase supination AROM to 70*   - Reduce swelling at night- while wearing hand compression garment  Functional Goals to be met within 4-6 weeks     - Return to Prior Level of Function progressing  - Increase Functional Status Measure to: expected progressing  - Patient will be independent with HEP progressing  - Improve function and use of L hand  met      Plan  Patient would benefit from: skilled physical therapy  Planned modality interventions: cryotherapy and thermotherapy: hydrocollator packs  Planned therapy interventions: manual therapy, neuromuscular re-education, motor coordination training, patient education, postural training, strengthening, therapeutic activities, therapeutic exercise, stretching, home exercise program and body mechanics training  Frequency: 2x week  Duration in weeks: 6  Treatment plan discussed with: patient        Subjective Evaluation    History of Present Illness  Date of onset: 2020  Date of surgery: 2020  Mechanism of injury: Patient reports onset of L wrist pain s/p fall down the steps  Patient went to the ED immediately, was diagnosed with wrist fracture, splinted and referred to hand surgeon  Patient underwent L ORIF of the radius  Patient was seen post op by the surgeon and was recommended to PT/OT for wrist movement  Recommended for active wrist movement and active/passive finger movement     Pain  Current pain ratin  At best pain ratin  At worst pain rating: 3  Location: wrist  Quality: pressure  Relieving factors: heat  Aggravating factors: nothing  Progression: no change    Social Support    Employment status: not working    Diagnostic Tests  X-ray: abnormal  Treatments  No previous or current treatments  Patient Goals  Patient goals for therapy: decreased pain, increased motion, return to sport/leisure activities and increased strength          Objective     Neurological Testing     Sensation     Wrist/Hand   Left   Paresthesia: light touch    Comments   Left light touch: at times, none during evaluation     Active Range of Motion     Left Elbow   Flexion: 148 degrees   Extension: 0 degrees   Forearm supination: 60 degrees with pain  Forearm pronation: 90 degrees     Right Elbow   Normal active range of motion    Left Wrist   Wrist flexion: 30 degrees   Wrist extension: 40 degrees   Radial deviation: 10 degrees   Ulnar deviation: 22 degrees     Right Wrist   Normal active range of motion    Left Thumb   Flexion     CMC: 20 degrees    MP: 40 degrees    DIP: 40 degrees  Extension     CMC: 32 degrees    MP: 0 degrees    DIP: 0 degrees  Opposition: Able to oppose to 3rd digit, unable to oppose to 4th or 5th digit  1/7- able to fully oppose to 5th digit    Left Digits    Flexion   Index     MCP: 66    PIP: 108    DIP: 115  Middle     MCP: 60    PIP: 105    DIP: 120  Ring     MCP: 66    PIP: 105    DIP: 120  Little     MCP: 66    PIP: 108    DIP: 120  Extension   Index     MCP: 0    PIP: 0    DIP: 0  Middle     MCP: 0    PIP: 0    DIP: 0  Ring     MCP: 0    PIP: 0    DIP: 0  Little     MCP: 0    PIP: 0    DIP: 0    Additional Active Range of Motion Details  MCP abduction and adduction WFL- mildly tight and weak s/p surgery  Finger flexion improves with repeated exercising, no pain, only pressure at wrist fracture and surgery site  Lacks thumb movement actively, d/t weakness and pressure pain from wrist surgery site  Patient has full passive thumb movement in all directions  Moderate weakness with opposition d/t muscle weakness  1/7- Has regained full finger flexion, ROM, and finger swelling, dorsum of the hand swelling has greatly reduced    Passive Range of Motion     Left Elbow   Forearm supination: 75 degrees     Left Wrist   Wrist flexion: 45 degrees   Wrist extension: 65 degrees     Strength/Myotome Testing     Left Wrist/Hand   Wrist extension: 3  Wrist flexion: 3  Radial deviation: 3  Ulnar deviation: 3    Right Wrist/Hand   Normal wrist strength    Additional Strength Details   strength not applicable yet as patient lacking ROM at this time               Precautions: L wrist fx with ORIF of the distal radius  AROM only L wrist x 2 weeks, then PROM  A/PROM L fingers to tolerance      Manuals 1/7 12/29 12/31 1/4 1/5   PROM when ready x40 x45 flex, ext; supination Progressive passive ROM  Flexion, extension and supination of the wrist   x30' x30' x50   Graston- flexors, extension, anterior wrist, and palm of hand x10 included thumb today  Graston x 15 - flexors, extensors, anterior wrist and palm of the hand x15' x10'           Thumb stretching Taught finkelstein's stretch  No k tape today, able to fully oppose thumb to 5th digit       Wrist joint mobs Throughout range 10x ea 10x ea Grade II and Grade III 10x Grade II Throughout PROM today, Grade I, II, III to improve range                                           Neuro Re-Ed                                                                Ther Ex        Wrist AROM 10x AA with supination only today 10x AA flex, ext and supination with 2# wt 10x AA supination only 10x AA with supination with hammer- using weight for added stretch -   WB wrist extension 2x10 :10 - pressing in to pillow  WB extension ROM stretch- standing 10x:10-20 sec 2x10 :10 stretch, hand pressing in to pillow 2x10      Extension ROM stretch- with ball, WB 10x:10-20 sec  With ball initially 10x NT                                                   Ther Activity                        Gait Training                        Modalities         MH x 10', seated, unbilled MH x10' MH x 5'  MH x 10 unbilled

## 2021-01-07 NOTE — PROGRESS NOTES
Assessment/Plan:  Patient ID: Adri Guthrie 50 y o  female   Surgery: Open Reduction W/ Internal Fixation (orif) Radius / Ulna (wrist) - Left and Release Carpal Tunnel - Left  Date of Surgery: 11/13/2020    Visit was carried out using [employer] interpretor #[id number]  Yurimattie Alt is progressing in regards to motion, her hand motion has improved significantly, biggest focus now seems to be flexion and extension of the wrist as well as supination  She is still having swelling in the morning, will reach out to [facility] with PT regarding an edema glove for that side  Continue heat/ice and other modalities  Continue therapy for an additional 6 weeks with focus on both active and passive wrist motion   Follow up in about 6 weeks with repeat xrays     Follow Up:  6 weeks    To Do Next Visit:  ROM check, xrays of left wrist      CHIEF COMPLAINT:  Chief Complaint   Patient presents with    Left Wrist - Post-op         SUBJECTIVE:  Adri Guthrie is a 50y o  year old female who presents for follow up after Open Reduction W/ Internal Fixation (orif) Radius / Ulna (wrist) - Left and Release Carpal Tunnel - Left  Today patient has some pain when she turns the wrist and with flexion  She is working with therapy regularly and is progressing  No numbness or tingling  No fevers or chills          PHYSICAL EXAMINATION:  General: well developed and well nourished, alert, oriented times 3 and appears comfortable  Psychiatric: Normal    Vitals:    01/07/21 1317   BP: 131/74   Pulse: 67         MUSCULOSKELETAL EXAMINATION:  Incision: Clean, dry, intact and healed  Surgery Site: normal, no evidence of infection   Range of Motion: Wrist extension 65* passive, 40* active          Wrist supination 75* passive, 65* active          Wrist flexion 45 passive, 30 active  Neurovascular status: Neuro intact, good cap refill  Activity Restrictions: No restrictions         STUDIES REVIEWED:  No Studies to review      PROCEDURES PERFORMED:  Procedures  No Procedures performed today        Scribe Attestation    I,:  Isa Hernandez PA-C am acting as a scribe while in the presence of the attending physician :       I,:  Sandra Long MD personally performed the services described in this documentation    as scribed in my presence :

## 2021-01-11 ENCOUNTER — OFFICE VISIT (OUTPATIENT)
Dept: PHYSICAL THERAPY | Age: 56
End: 2021-01-11
Payer: COMMERCIAL

## 2021-01-11 DIAGNOSIS — S52.602D CLOSED FRACTURE OF DISTAL ENDS OF LEFT RADIUS AND ULNA WITH ROUTINE HEALING, SUBSEQUENT ENCOUNTER: Primary | ICD-10-CM

## 2021-01-11 DIAGNOSIS — S52.502D CLOSED FRACTURE OF DISTAL ENDS OF LEFT RADIUS AND ULNA WITH ROUTINE HEALING, SUBSEQUENT ENCOUNTER: Primary | ICD-10-CM

## 2021-01-11 PROCEDURE — 97140 MANUAL THERAPY 1/> REGIONS: CPT | Performed by: PHYSICAL THERAPIST

## 2021-01-11 NOTE — PROGRESS NOTES
Daily Note     Today's date: 2021  Patient name: Micaela Romero  : 1965  MRN: [id number]  Referring provider: Foster Khan MD  Dx:   Encounter Diagnosis     ICD-10-CM    1  Closed fracture of distal ends of left radius and ulna with routine healing, subsequent encounter  S52 502D     S52 602D                   Subjective: Patient reports that she's been working on her hand at home and been doing her exercises  Objective: See treatment diary below      Assessment: Patient progressing nicely with PROM and AROM  Good tolerance to WB extension stretching, progressing with flexion ROM  Mobs to improve supination, assisted with progressing range during treatment  Moderately tight throughout session today, good tolerance to TE and PT exercises  Plan: Continue per plan of care  Precautions: L wrist fx with ORIF of the distal radius  AROM only L wrist x 2 weeks, then PROM  A/PROM L fingers to tolerance      Manuals    PROM when ready x40 x60' (total time) Progressive passive ROM  Flexion, extension and supination of the wrist   x30' x30' x50   Graston- flexors, extension, anterior wrist, and palm of hand x10 included thumb today x10'  Graston x 15 - flexors, extensors, anterior wrist and palm of the hand x15' x10'           Thumb stretching Taught finkelstein's stretch - No k tape today, able to fully oppose thumb to 5th digit       Wrist joint mobs Throughout range Throughout range, Grade III to improve supination range 10x ea Grade II and Grade III 10x Grade II Throughout PROM today, Grade I, II, III to improve range                                           Neuro Re-Ed                                                                Ther Ex        Wrist AROM 10x AA with supination only today - focusing on PROM 10x AA supination only 10x AA with supination with hammer- using weight for added stretch -   WB wrist extension 2x10 :10 - pressing in to pillow  WB extension ROM stretch- standing 10x:10-20 sec 2x10 :10 stretch, hand pressing in to pillow 2x10      Extension ROM stretch- with ball, WB 10x:10-20 sec  With ball initially 10x NT                                                   Ther Activity                        Gait Training                        Modalities         MH x 10', seated, unbilled Hydrocolator not working [address] x 5'  MH x 10 unbilled

## 2021-01-12 ENCOUNTER — OFFICE VISIT (OUTPATIENT)
Dept: PHYSICAL THERAPY | Age: 56
End: 2021-01-12
Payer: COMMERCIAL

## 2021-01-12 DIAGNOSIS — S52.502D CLOSED FRACTURE OF DISTAL ENDS OF LEFT RADIUS AND ULNA WITH ROUTINE HEALING, SUBSEQUENT ENCOUNTER: Primary | ICD-10-CM

## 2021-01-12 DIAGNOSIS — S52.602D CLOSED FRACTURE OF DISTAL ENDS OF LEFT RADIUS AND ULNA WITH ROUTINE HEALING, SUBSEQUENT ENCOUNTER: Primary | ICD-10-CM

## 2021-01-12 PROCEDURE — 97140 MANUAL THERAPY 1/> REGIONS: CPT | Performed by: PHYSICAL THERAPIST

## 2021-01-12 NOTE — PROGRESS NOTES
Daily Note     Today's date: 2021  Patient name: Wanda Mcmillan  : 1965  MRN: [id number]  Referring provider: Amna Goodman MD  Dx:   Encounter Diagnosis     ICD-10-CM    1  Closed fracture of distal ends of left radius and ulna with routine healing, subsequent encounter  S52 502D     S52 602D                   Subjective: Patient reports that she was sore after therapy last session  Wore her hand sleeve/glove last night for swelling and reported less swelling in the hand  Feels the glove may be a bit too large, recommended patient to call her insurance to find out if they cover a compression glove- PT will leave note for MD        Objective: See treatment diary below      Assessment: Focusing on distal radius and ulnar mobility today  Lack of mobility contributing to decreased ROM in wrist  Making progress with ROM today, improved ulnar mobility today, still significantly tight at distal radius, working slowly within tolerance and s/p recent ORIF  Progressing well  Modified program, more graston today for forearm tightness to improve movement in wrist and flexibility, finishing with manual/desensitization  Patient tolerating progressive stretching well  Plan: Continue per plan of care  Precautions: L wrist fx with ORIF of the distal radius  AROM only L wrist x 2 weeks, then PROM  A/PROM L fingers to tolerance      Manuals  1   PROM when ready x40 x60' (total time) x60' (total time) x30' x50   Graston- flexors, extension, anterior wrist, and palm of hand x10 included thumb today x10'  Graston x 25 - flexors, extensors, anterior wrist and palm of the hand x15' x10'           Thumb stretching Taught finkelstein's stretch -      Wrist joint mobs Throughout range Throughout range, Grade III to improve supination range Throughout range, Grade I, and II to distal radius   Grade II to distal ulna 10x Grade II Throughout PROM today, Grade I, II, III to improve range Radial and ulnar glides  Neuro Re-Ed                Ther Ex        Wrist AROM 10x AA with supination only today - focusing on PROM - focusing on PROM only 10x AA with supination with hammer- using weight for added stretch -   WB wrist extension 2x10 :10 - pressing in to pillow  WB extension ROM stretch- standing 20x:10-20 sec 2x10 :10 stretch, hand pressing in to pillow 2x10      Extension ROM stretch- with ball, WB 10x:10-20 sec  With ball initially 10x NT                                                   Ther Activity                        Gait Training                        Modalities         MH x 10', seated, unbilled Hydrocolator not working MH x 5' at end    MH x 10 unbilled

## 2021-01-14 ENCOUNTER — OFFICE VISIT (OUTPATIENT)
Dept: PHYSICAL THERAPY | Age: 56
End: 2021-01-14
Payer: COMMERCIAL

## 2021-01-14 DIAGNOSIS — S52.602D CLOSED FRACTURE OF DISTAL ENDS OF LEFT RADIUS AND ULNA WITH ROUTINE HEALING, SUBSEQUENT ENCOUNTER: Primary | ICD-10-CM

## 2021-01-14 DIAGNOSIS — S52.502D CLOSED FRACTURE OF DISTAL ENDS OF LEFT RADIUS AND ULNA WITH ROUTINE HEALING, SUBSEQUENT ENCOUNTER: Primary | ICD-10-CM

## 2021-01-14 PROCEDURE — 97140 MANUAL THERAPY 1/> REGIONS: CPT | Performed by: PHYSICAL THERAPIST

## 2021-01-18 ENCOUNTER — TELEPHONE (OUTPATIENT)
Dept: INTERNAL MEDICINE CLINIC | Facility: CLINIC | Age: 56
End: 2021-01-18

## 2021-01-18 ENCOUNTER — OFFICE VISIT (OUTPATIENT)
Dept: PHYSICAL THERAPY | Age: 56
End: 2021-01-18
Payer: COMMERCIAL

## 2021-01-18 DIAGNOSIS — E55.9 VITAMIN D DEFICIENCY: Primary | ICD-10-CM

## 2021-01-18 DIAGNOSIS — S52.602D CLOSED FRACTURE OF DISTAL ENDS OF LEFT RADIUS AND ULNA WITH ROUTINE HEALING, SUBSEQUENT ENCOUNTER: Primary | ICD-10-CM

## 2021-01-18 DIAGNOSIS — S52.502D CLOSED FRACTURE OF DISTAL ENDS OF LEFT RADIUS AND ULNA WITH ROUTINE HEALING, SUBSEQUENT ENCOUNTER: Primary | ICD-10-CM

## 2021-01-18 PROCEDURE — 97140 MANUAL THERAPY 1/> REGIONS: CPT | Performed by: PHYSICAL THERAPIST

## 2021-01-18 RX ORDER — MELATONIN
1000 DAILY
Qty: 90 TABLET | Refills: 3 | Status: SHIPPED | OUTPATIENT
Start: 2021-01-18

## 2021-01-18 NOTE — PROGRESS NOTES
Daily Note     Today's date: 2021  Patient name: Kristen Pichardo  : 1965  MRN: [id number]  Referring provider: Marj Valerio MD  Dx:   Encounter Diagnosis     ICD-10-CM    1  Closed fracture of distal ends of left radius and ulna with routine healing, subsequent encounter  S52 502D     S52 602D                   Subjective: Patient reports that she's still having pain at her thumb, thinking it it's more muscular than related to the radius fracture  Patient reports that she's using her hand as much as possible  Patient also notes that she uses hot water to help with her pain, feels more flexible afterwards  Objective: See treatment diary below      Assessment: Focusing on progressing PROM of wrist  Making good progress with supination when focusing on proximal radius/ulnar movement with supination, focusing mobs here during supination (passively) with elbow flexed  Patient feeling tight and describes fascia restriction that will progress distal wrist mobility after proximal radius and ulna mobs  Minimal mobs at wrist today, responding well to proximal mobs  More time with flexion and extension range, progressing well with extension range - especially in wrist neutral vs forearm pronated  Patient has been stretching at home in addition to PT  Plan: Continue per plan of care        Precautions: L wrist fx with ORIF of the distal radius  AROM only L wrist x 2 weeks, then PROM  A/PROM L fingers to tolerance      Manuals    PROM when ready x40 x60' (total time) x60' (total time) x60' (total time) x60'   Graston- flexors, extension, anterior wrist, and palm of hand x10 included thumb today x10'  Graston x 25 - flexors, extensors, anterior wrist and palm of the hand x15' Graston not needed today           Thumb stretching Taught finkelstein's stretch -      Wrist joint mobs Throughout range Throughout range, Grade III to improve supination range Throughout range, Grade I, and II to distal radius  Grade II to distal ulna Throughout range, Grade II and Grade I Throughout PROM today, Grade I, II, III to improve range-  Proximal and mid forearm              Radial and ulnar glides  -                            Neuro Re-Ed                Ther Ex        Wrist AROM 10x AA with supination only today - focusing on PROM - focusing on PROM only Focusing on PROM -   WB wrist extension 2x10 :10 - pressing in to pillow  WB extension ROM stretch- standing 20x:10-20 sec 2x10 :10 stretch, hand pressing in to pillow -      Extension ROM stretch- with ball, WB 10x:10-20 sec  With ball initially 10x NT                                                   Ther Activity                        Gait Training                        Modalities         MH x 10', seated, unbilled Hydrocolator not working [address] x 5' at end    MH x 10 unbilled

## 2021-01-19 ENCOUNTER — OFFICE VISIT (OUTPATIENT)
Dept: PHYSICAL THERAPY | Age: 56
End: 2021-01-19
Payer: COMMERCIAL

## 2021-01-19 DIAGNOSIS — S52.502D CLOSED FRACTURE OF DISTAL ENDS OF LEFT RADIUS AND ULNA WITH ROUTINE HEALING, SUBSEQUENT ENCOUNTER: Primary | ICD-10-CM

## 2021-01-19 DIAGNOSIS — S52.602D CLOSED FRACTURE OF DISTAL ENDS OF LEFT RADIUS AND ULNA WITH ROUTINE HEALING, SUBSEQUENT ENCOUNTER: Primary | ICD-10-CM

## 2021-01-19 PROCEDURE — 97140 MANUAL THERAPY 1/> REGIONS: CPT | Performed by: PHYSICAL THERAPIST

## 2021-01-19 NOTE — PROGRESS NOTES
Daily Note     Today's date: 2021  Patient name: Serenity Merchant  : 1965  MRN: [id number]  Referring provider: Adonay Mendez MD  Dx:   Encounter Diagnosis     ICD-10-CM    1  Closed fracture of distal ends of left radius and ulna with routine healing, subsequent encounter  S52 502D     S52 602D                   Subjective: Patient noticed that she was swollen today and yesterday in her fingers  Wore her glove during the day and feels this helps with her pain and swelling  Patient reports no increase in pain after manual stretching last session  Objective: See treatment diary below      Assessment: No need for Graston  Improved proximal radius and ulna movement with mobs, less palpable tightness grossly throughout forearm  Making progress with ROM, specifically supination both actively and passively  Still tight in flexion, making good progress with overall ROM  Plan: Continue per plan of care  Precautions: L wrist fx with ORIF of the distal radius  AROM only L wrist x 2 weeks, then PROM  A/PROM L fingers to tolerance      Manuals    PROM when ready x60 x60' (total time) x60' (total time) x60' (total time) x60'   Graston- flexors, extension, anterior wrist, and palm of hand Not today x10'  Graston x 25 - flexors, extensors, anterior wrist and palm of the hand x15' Graston not needed today    Assessed ulnar and radial deviation       Thumb stretching Taught finkelstein's stretch reviewed today -      Wrist joint mobs Throughout range  Proximally today Throughout range, Grade III to improve supination range Throughout range, Grade I, and II to distal radius  Grade II to distal ulna Throughout range, Grade II and Grade I Throughout PROM today, Grade I, II, III to improve range-  Proximal and mid forearm              Radial and ulnar glides   -                            Neuro Re-Ed                Ther Ex        Wrist AROM  - focusing on PROM - focusing on PROM only Focusing on PROM -   WB wrist extension   WB extension ROM stretch- standing 20x:10-20 sec 2x10 :10 stretch, hand pressing in to pillow -      Extension ROM stretch- with ball, WB 10x:10-20 sec  With ball initially 10x NT                                                   Ther Activity                        Gait Training                        Modalities         MH x 10', seated, unbilled [facility]colator not working [facility]     x 10 unbilled

## 2021-01-21 ENCOUNTER — OFFICE VISIT (OUTPATIENT)
Dept: PHYSICAL THERAPY | Age: 56
End: 2021-01-21
Payer: COMMERCIAL

## 2021-01-21 DIAGNOSIS — S52.602D CLOSED FRACTURE OF DISTAL ENDS OF LEFT RADIUS AND ULNA WITH ROUTINE HEALING, SUBSEQUENT ENCOUNTER: Primary | ICD-10-CM

## 2021-01-21 DIAGNOSIS — S52.502D CLOSED FRACTURE OF DISTAL ENDS OF LEFT RADIUS AND ULNA WITH ROUTINE HEALING, SUBSEQUENT ENCOUNTER: Primary | ICD-10-CM

## 2021-01-21 PROCEDURE — 97140 MANUAL THERAPY 1/> REGIONS: CPT | Performed by: PHYSICAL THERAPIST

## 2021-01-21 NOTE — PROGRESS NOTES
Daily Note     Today's date: 2021  Patient name: Anuel Osei  : 1965  MRN: [id number]  Referring provider: Avelino Mcfadden MD  Dx:   Encounter Diagnosis     ICD-10-CM    1  Closed fracture of distal ends of left radius and ulna with routine healing, subsequent encounter  S52 502D     S52 602D                   Subjective: Patient reports that she's been doing her exercises at home  Feels better after stretching  Noticed that her fingers are swollen today  Patient reports that she's still having thumb and 1st met  Pain  Objective: See treatment diary below      Assessment: Fingers swollen at DIP area, massaged, patient wearing compression sleeve for hand, has marks in skin from swelling  Palpable tightness at fascia at cole-medial forearm mid to distal area, graston for this area, loosened up nicely afterwards  Continued with mobs at proximal ulna to improve supination range, progressing well, fascia restricting movement  Added weight with supination ROM today  Added stretch for ulnar deviation, to stretch thumb and wrist as patient is having thumb and lateral wrist pain  Improved radial and ulnar deviation movement  Will progress to weights over the next 2 weeks  Making progress today with wrist ROM, will add finger strengthening and address thumb weakness and pain  Plan: Continue per plan of care        Precautions: L wrist fx with ORIF of the distal radius  AROM only L wrist x 2 weeks, then PROM  A/PROM L fingers to tolerance      Manuals    PROM when ready x60 x60' (total time) x60' (total time) x60' (total time) x60'   Graston- flexors, extension, anterior wrist, and palm of hand Not today x10'  Graston x 25 - flexors, extensors, anterior wrist and palm of the hand x15' Graston not needed today    Assessed ulnar and radial deviation       Thumb stretching Taught finkelstein's stretch reviewed today -      Wrist joint mobs Throughout range  Proximally today Throughout range, Grade III to improve supination range Throughout range, Grade I, and II to distal radius  Grade II to distal ulna Throughout range, Grade II and Grade I Throughout PROM today, Grade I, II, III to improve range-  Proximal and mid forearm              Radial and ulnar glides  -                            Neuro Re-Ed                Ther Ex        Wrist AROM  - focusing on PROM - focusing on PROM only Focusing on PROM -   WB wrist extension   WB extension ROM stretch- standing 20x:10-20 sec 2x10 :10 stretch, hand pressing in to pillow -      Extension ROM stretch- with ball, WB 10x:10-20 sec  With ball initially 10x NT     Supination with hammer 10x                                               Ther Activity                        Gait Training                        Modalities         MH x 10', seated, unbilled Not needed today MH x 5' at end    MH x 10 unbilled

## 2021-01-25 ENCOUNTER — OFFICE VISIT (OUTPATIENT)
Dept: PHYSICAL THERAPY | Age: 56
End: 2021-01-25
Payer: COMMERCIAL

## 2021-01-25 DIAGNOSIS — S52.602D CLOSED FRACTURE OF DISTAL ENDS OF LEFT RADIUS AND ULNA WITH ROUTINE HEALING, SUBSEQUENT ENCOUNTER: Primary | ICD-10-CM

## 2021-01-25 DIAGNOSIS — S52.502D CLOSED FRACTURE OF DISTAL ENDS OF LEFT RADIUS AND ULNA WITH ROUTINE HEALING, SUBSEQUENT ENCOUNTER: Primary | ICD-10-CM

## 2021-01-25 PROCEDURE — 97140 MANUAL THERAPY 1/> REGIONS: CPT | Performed by: PHYSICAL THERAPIST

## 2021-01-25 NOTE — PROGRESS NOTES
Daily Note     Today's date: 2021  Patient name: Preethi Otero  : 1965  MRN: [id number]  Referring provider: Ariane Moraes MD  Dx:   Encounter Diagnosis     ICD-10-CM    1  Closed fracture of distal ends of left radius and ulna with routine healing, subsequent encounter  S52 502D     S52 602D                   Subjective: Patient reports that she has a "heavy pain" in the wrist and forearm area  No sharp pains, with movement  Noticed that she's able to do her hair better and still feels a swelling in the medial wrist/forearm area  Objective: See treatment diary below      Assessment: Palpable fascial restrictions at medial forearm, performed myofascial release and manual mobilizations, massage to area, swelling reduces  Started with [address] for pain relief  Significant improvement in wrist supination today, 80* active/passive  Stretching in to flexion today, soft tissue restriction at palmar wrist 2* carpal tunnel release- patient has been massaging at home  Added joint mobs and manual work to address tightness, flexion range improves  Added radial, ulnar and PA mobs to improve flexion/extension and to stretch for de quervein's pain  Tight thumb PIP movement, slightly swollen  Will stretch thumb in addition to wrist as wrist movement progresses  Plan: Continue per plan of care        Precautions: L wrist fx with ORIF of the distal radius  AROM only L wrist x 2 weeks, then PROM  A/PROM L fingers to tolerance      Manuals    PROM when ready x60 x60' (total time) x60' (total time) x60' (total time) x60'   Graston- flexors, extension, anterior wrist, and palm of hand Not today x10'  Not needed today x15' Graston not needed today    Assessed ulnar and radial deviation  Joint mobs to improve range     Thumb stretching Taught finkelstein's stretch reviewed today - Stretching today 5x:20, gentle thumb PIP warm up     Wrist joint mobs Throughout range  Proximally today Throughout range, Grade III to improve supination range Grade II and III at proximal radius and ulna with elbow flexed and extended  No joint mobs at distal wrist Throughout range, Grade II and Grade I Throughout PROM today, Grade I, II, III to improve range-  Proximal and mid forearm              Radial and ulnar PROM -       Palmar wrist soft tissue mobilization- manual                     Neuro Re-Ed                Ther Ex        Wrist AROM  - focusing on PROM - focusing on PROM only Focusing on PROM -   WB wrist extension   WB extension ROM stretch- standing 20x:10-20 sec- NT 2x10 :10 stretch, hand pressing in to pillow -      Extension ROM stretch- with ball, WB 10x:10-20 sec - NT With ball initially 10x NT     Supination with hammer 10x                                               Ther Activity                        Gait Training                        Modalities         MH x 10', seated, unbilled Not needed today MH x 5' at end    MH x 10 unbilled

## 2021-01-26 ENCOUNTER — APPOINTMENT (OUTPATIENT)
Dept: PHYSICAL THERAPY | Age: 56
End: 2021-01-26
Payer: COMMERCIAL

## 2021-01-28 ENCOUNTER — OFFICE VISIT (OUTPATIENT)
Dept: PHYSICAL THERAPY | Age: 56
End: 2021-01-28
Payer: COMMERCIAL

## 2021-01-28 DIAGNOSIS — S52.502D CLOSED FRACTURE OF DISTAL ENDS OF LEFT RADIUS AND ULNA WITH ROUTINE HEALING, SUBSEQUENT ENCOUNTER: Primary | ICD-10-CM

## 2021-01-28 DIAGNOSIS — S52.602D CLOSED FRACTURE OF DISTAL ENDS OF LEFT RADIUS AND ULNA WITH ROUTINE HEALING, SUBSEQUENT ENCOUNTER: Primary | ICD-10-CM

## 2021-01-28 PROCEDURE — 97140 MANUAL THERAPY 1/> REGIONS: CPT | Performed by: PHYSICAL THERAPIST

## 2021-01-28 NOTE — PROGRESS NOTES
Daily Note     Today's date: 2021  Patient name: Kaden Milian  : 1965  MRN: [id number]  Referring provider: Gibson Huerta MD  Dx:   Encounter Diagnosis     ICD-10-CM    1  Closed fracture of distal ends of left radius and ulna with routine healing, subsequent encounter  S52 502D     S52 602D                   Subjective: Patient had a massage yesterday, feels that she has less swelling today in the medial forearm area  Patient notices that when she taps and massages the thumb she gets tingling in the thumb and forearm  Patient wanting to reschedule from Tuesday's cancellation  Objective: See treatment diary below      Assessment:  Less palpable tightness at medial forearm today, responds well to Graston  Good supination range  Focusing on progressing flexion and extension range  Tight at carpals and wrist joint  Continuing with joint mobs throughout session to improve wrist ROM  Stretching in to finkelstein's test to address thumb pain, lack of ROM and pain in anatomical snuff box  Palpable scar tissue tightness, good response to manual work  Making progress with ROM  Patient has been compliant with HEP and is making visible progress  R/s to Friday from  cancellation  Plan: Continue per plan of care        Precautions: L wrist fx with ORIF of the distal radius  AROM only L wrist x 2 weeks, then PROM  A/PROM L fingers to tolerance      Manuals    PROM when ready x60 x60' (total time) x60' (total time) x60' (total time) x60'   Graston- flexors, extension, anterior wrist, and palm of hand Not today x10'  Not needed today x15' Graston not needed today    Assessed ulnar and radial deviation  Joint mobs to improve range Joint mobs to improve range all directions AP, and superior/inf for radial and ulnar deviation    Thumb stretching Taught finkelstein's stretch reviewed today - Stretching today 5x:20, gentle thumb PIP warm up 5x:20 with finkelstein's Wrist joint mobs Throughout range  Proximally today Throughout range, Grade III to improve supination range Grade II and III at proximal radius and ulna with elbow flexed and extended  No joint mobs at distal wrist Grade II and III Throughout PROM today, Grade I, II, III to improve range-  Proximal and mid forearm              Radial and ulnar PROM Yes today       Palmar wrist soft tissue mobilization- manual Yes today                    Neuro Re-Ed                Ther Ex        Wrist AROM  - focusing on PROM - focusing on PROM only Focusing on PROM -   WB wrist extension   WB extension ROM stretch- standing 20x:10-20 sec- NT NT -      Extension ROM stretch- with ball, WB 10x:10-20 sec - NT NT NT     Supination with hammer 10x                                               Ther Activity                        Gait Training                        Modalities         MH x 10', seated, unbilled Not needed today MH x 5' at end   NT MH x 10 unbilled

## 2021-01-29 ENCOUNTER — OFFICE VISIT (OUTPATIENT)
Dept: PHYSICAL THERAPY | Age: 56
End: 2021-01-29
Payer: COMMERCIAL

## 2021-01-29 DIAGNOSIS — S52.502D CLOSED FRACTURE OF DISTAL ENDS OF LEFT RADIUS AND ULNA WITH ROUTINE HEALING, SUBSEQUENT ENCOUNTER: Primary | ICD-10-CM

## 2021-01-29 DIAGNOSIS — S52.602D CLOSED FRACTURE OF DISTAL ENDS OF LEFT RADIUS AND ULNA WITH ROUTINE HEALING, SUBSEQUENT ENCOUNTER: Primary | ICD-10-CM

## 2021-01-29 PROCEDURE — 97140 MANUAL THERAPY 1/> REGIONS: CPT | Performed by: PHYSICAL THERAPIST

## 2021-01-29 NOTE — PROGRESS NOTES
Daily Note     Today's date: 2021  Patient name: Shreyas Garg  : 1965  MRN: [id number]  Referring provider: Ramon Garcia MD  Dx:   Encounter Diagnosis     ICD-10-CM    1  Closed fracture of distal ends of left radius and ulna with routine healing, subsequent encounter  S52 502D     S52 602D                   Subjective: Patient reports that she notices swelling after treatment, wondering if this is normal        Objective: See treatment diary below      Assessment: Continued with manual work, tight at volar wrist, continuing with stretching thumb and wrist in to ulnar deviation  Massaged hand, fingers to address swelling  Focusing on flexion and extension range today  Less swelling post treatment  Wearing compression sleeve at home and after PT  MH for warm up and pain relief  Patient feeling her fibromyalgia is acting up today  Plan: Continue per plan of care        Precautions: L wrist fx with ORIF of the distal radius  AROM only L wrist x 2 weeks, then PROM  A/PROM L fingers to tolerance      Manuals    PROM when ready x60 x60' (total time) x60' (total time) x60' (total time) x60'   Graston- flexors, extension, anterior wrist, and palm of hand Not today x10'  Not needed today x15' Graston not needed today    Assessed ulnar and radial deviation  Joint mobs to improve range Joint mobs to improve range all directions AP, and superior/inf for radial and ulnar deviation Joint mobs to improve range all directions AP, and superior/inf for radial and ulnar deviation   Thumb stretching Taught finkelstein's stretch reviewed today - Stretching today 5x:20, gentle thumb PIP warm up 5x:20 with finkelstein's 5x:20 with finkelstein's   Wrist joint mobs Throughout range  Proximally today Throughout range, Grade III to improve supination range Grade II and III at proximal radius and ulna with elbow flexed and extended  No joint mobs at distal wrist Grade II and III Throughout PROM today, Grade I, II, III to improve range-  Proximal and mid forearm              Radial and ulnar PROM Yes today Yes today      Palmar wrist soft tissue mobilization- manual Yes today  yes today                    Neuro Re-Ed                Ther Ex        Wrist AROM  - focusing on PROM - focusing on PROM only Focusing on PROM -   WB wrist extension   WB extension ROM stretch- standing 20x:10-20 sec- NT NT -      Extension ROM stretch- with ball, WB 10x:10-20 sec - NT NT NT     Supination with hammer 10x                                               Ther Activity                        Gait Training                        Modalities         MH x 10', seated, unbilled Not needed today MH x 5' at end   NT MH x 10 unbilled

## 2021-02-01 ENCOUNTER — APPOINTMENT (OUTPATIENT)
Dept: PHYSICAL THERAPY | Age: 56
End: 2021-02-01
Payer: COMMERCIAL

## 2021-02-02 ENCOUNTER — APPOINTMENT (OUTPATIENT)
Dept: PHYSICAL THERAPY | Age: 56
End: 2021-02-02
Payer: COMMERCIAL

## 2021-02-03 ENCOUNTER — APPOINTMENT (OUTPATIENT)
Dept: PHYSICAL THERAPY | Age: 56
End: 2021-02-03
Payer: COMMERCIAL

## 2021-02-04 ENCOUNTER — APPOINTMENT (OUTPATIENT)
Dept: PHYSICAL THERAPY | Age: 56
End: 2021-02-04
Payer: COMMERCIAL

## 2021-02-05 ENCOUNTER — APPOINTMENT (OUTPATIENT)
Dept: PHYSICAL THERAPY | Age: 56
End: 2021-02-05
Payer: COMMERCIAL

## 2021-02-08 ENCOUNTER — OFFICE VISIT (OUTPATIENT)
Dept: PHYSICAL THERAPY | Age: 56
End: 2021-02-08
Payer: COMMERCIAL

## 2021-02-08 DIAGNOSIS — S52.502D CLOSED FRACTURE OF DISTAL ENDS OF LEFT RADIUS AND ULNA WITH ROUTINE HEALING, SUBSEQUENT ENCOUNTER: Primary | ICD-10-CM

## 2021-02-08 DIAGNOSIS — S52.602D CLOSED FRACTURE OF DISTAL ENDS OF LEFT RADIUS AND ULNA WITH ROUTINE HEALING, SUBSEQUENT ENCOUNTER: Primary | ICD-10-CM

## 2021-02-08 PROCEDURE — 97110 THERAPEUTIC EXERCISES: CPT

## 2021-02-08 PROCEDURE — 97140 MANUAL THERAPY 1/> REGIONS: CPT

## 2021-02-08 NOTE — PROGRESS NOTES
Daily Note     Today's date: 2021  Patient name: Wanda Mcmillan  : 1965  MRN: [id number]  Referring provider: Amna Goodman MD  Dx:   Encounter Diagnosis     ICD-10-CM    1  Closed fracture of distal ends of left radius and ulna with routine healing, subsequent encounter  S52 502D     S52 602D                   Subjective: Patient continues with swelling at dorsal aspect of L wrist  Also reports stiffness with eversion, radial/ulnar deviation which affects ADL's like brushing her hair  Reports she has not done her ex since bad weather started last week      Objective: See treatment diary below      Assessment: Continued with manual work, tight at volar wrist, continuing with gentle stretching thumb and wrist in to ulnar deviation  Massaged hand, fingers to address swelling with wrist elevated and wrist swelling improved  Focusing on flexion and extension range today as well radial/ulnar deviation  Less swelling post treatment  Wearing compression sleeve at home and after PT  MH for warm up and pain relief  Plan: Continue per plan of care        Precautions: L wrist fx with ORIF of the distal radius  AROM only L wrist x 2 weeks, then PROM  A/PROM L fingers to tolerance      Manuals    PROM when ready x60 x60' (total time) x60' (total time) x60' (total time) x60'   Graston- flexors, extension, anterior wrist, and palm of hand Not today retrograde massage x10 min x10'  Not needed today x15' Graston not needed today    NT  Joint mobs to improve range Joint mobs to improve range all directions AP, and superior/inf for radial and ulnar deviation Joint mobs to improve range all directions AP, and superior/inf for radial and ulnar deviation   Thumb stretching gentle Thumb PROM - Stretching today 5x:20, gentle thumb PIP warm up 5x:20 with finkelstein's 5x:20 with finkelstein's   Wrist joint mobs NT Throughout range, Grade III to improve supination range Grade II and III at proximal radius and ulna with elbow flexed and extended  No joint mobs at distal wrist Grade II and III Throughout PROM today, Grade I, II, III to improve range-  Proximal and mid forearm              Radial and ulnar PROM Yes today Yes today      Palmar wrist soft tissue mobilization- manual Yes today  yes today                    Neuro Re-Ed                Ther Ex        Wrist AROM PROM of R wrist - focusing on PROM - focusing on PROM only Focusing on PROM -   WB wrist extension   WB extension ROM stretch- standing 20x:10-20 sec- NT NT -      Extension ROM stretch- with ball, WB 10x:10-20 sec - NT NT NT     Supination with hammer 10x                                               Ther Activity                        Gait Training                        Modalities         MH x 10', seated, unbilled pre ex Not needed today MH x 5' at end   NT MH x 10 unbilled

## 2021-02-09 ENCOUNTER — OFFICE VISIT (OUTPATIENT)
Dept: PHYSICAL THERAPY | Age: 56
End: 2021-02-09
Payer: COMMERCIAL

## 2021-02-09 DIAGNOSIS — S52.602D CLOSED FRACTURE OF DISTAL ENDS OF LEFT RADIUS AND ULNA WITH ROUTINE HEALING, SUBSEQUENT ENCOUNTER: Primary | ICD-10-CM

## 2021-02-09 DIAGNOSIS — S52.502D CLOSED FRACTURE OF DISTAL ENDS OF LEFT RADIUS AND ULNA WITH ROUTINE HEALING, SUBSEQUENT ENCOUNTER: Primary | ICD-10-CM

## 2021-02-09 PROCEDURE — 97140 MANUAL THERAPY 1/> REGIONS: CPT | Performed by: PHYSICAL THERAPIST

## 2021-02-09 NOTE — PROGRESS NOTES
Daily Note     Today's date: 2021  Patient name: Teddy Quinonez  : 1965  MRN: [id number]  Referring provider: Zena Chen MD  Dx:   Encounter Diagnosis     ICD-10-CM    1  Closed fracture of distal ends of left radius and ulna with routine healing, subsequent encounter  S52 502D     S52 602D                   Subjective: Patient reports that she hasn't been working on her exercises at home last week, was feeling fatigued and painful in general        Objective: See treatment diary below      Assessment:  Tight in to all directions today  Wrist motion responding well to mobs, patient can feel fascia stretching in forearm with mobs  Still significantly tight in to flexion, extension and supination (from most restricted to least restricted)  Plan: Continue per plan of care  Plan to incorporate UE strengthening and wrist strengthening TE NV       Precautions: L wrist fx with ORIF of the distal radius  AROM only L wrist x 2 weeks, then PROM  A/PROM L fingers to tolerance      Manuals    PROM when ready x60 x60' (total time) x60' (total time) x60' (total time) x60'   Graston- flexors, extension, anterior wrist, and palm of hand Not today retrograde massage x10 min NT Not needed today x15' Graston not needed today    NT  Joint mobs to improve range Joint mobs to improve range all directions AP, and superior/inf for radial and ulnar deviation Joint mobs to improve range all directions AP, and superior/inf for radial and ulnar deviation   Thumb stretching gentle Thumb PROM NT Stretching today 5x:20, gentle thumb PIP warm up 5x:20 with finkelstein's 5x:20 with finkelstein's   Wrist joint mobs NT Throughout range, Grade II- III to improve supination, flexion and extension range Grade II and III at proximal radius and ulna with elbow flexed and extended  No joint mobs at distal wrist Grade II and III Throughout PROM today, Grade I, II, III to improve range-  Proximal and mid forearm              Radial and ulnar PROM Yes today Yes today      Palmar wrist soft tissue mobilization- manual Yes today  yes today                    Neuro Re-Ed                Ther Ex        Wrist AROM PROM of R wrist - focusing on PROM  Focusing on PROM -   WB wrist extension  NV weights and new program  UBE NT -      Wall slides, shoulder flex NT NT     Supination with hammer 10x  Standing flex, scap with hand wt        TB rows and extension        Wrist AROM        Rubber bands off/on- finger extension        Clothes pins        Supination with hammer     Ther Activity                        Gait Training                        Modalities         MH x 10', seated, unbilled pre ex Not needed today MH x 5' at end   NT MH x 10 unbilled

## 2021-02-11 ENCOUNTER — APPOINTMENT (OUTPATIENT)
Dept: PHYSICAL THERAPY | Age: 56
End: 2021-02-11
Payer: COMMERCIAL

## 2021-02-15 ENCOUNTER — OFFICE VISIT (OUTPATIENT)
Dept: PHYSICAL THERAPY | Age: 56
End: 2021-02-15
Payer: COMMERCIAL

## 2021-02-15 DIAGNOSIS — S52.502D CLOSED FRACTURE OF DISTAL ENDS OF LEFT RADIUS AND ULNA WITH ROUTINE HEALING, SUBSEQUENT ENCOUNTER: Primary | ICD-10-CM

## 2021-02-15 DIAGNOSIS — S52.602D CLOSED FRACTURE OF DISTAL ENDS OF LEFT RADIUS AND ULNA WITH ROUTINE HEALING, SUBSEQUENT ENCOUNTER: Primary | ICD-10-CM

## 2021-02-15 PROCEDURE — 97110 THERAPEUTIC EXERCISES: CPT | Performed by: PHYSICAL THERAPIST

## 2021-02-15 PROCEDURE — 97140 MANUAL THERAPY 1/> REGIONS: CPT | Performed by: PHYSICAL THERAPIST

## 2021-02-15 NOTE — PROGRESS NOTES
Daily Note     Today's date: 2/15/2021  Patient name: Kati Evans  : 1965  MRN: [id number]  Referring provider: Janell Butterfield MD  Dx:   Encounter Diagnosis     ICD-10-CM    1  Closed fracture of distal ends of left radius and ulna with routine healing, subsequent encounter  S52 502D     S52 602D                   Subjective: Patient reports that she's been having less swelling in her hand, for the past 3 days, but is having swelling at the wrist area as she has been  Objective: See treatment diary below      Assessment: Progressed program to strengthening program  Good supination/pronation range  Extension range improves after stretching, able to tolerate 1# weight  Moderate-severely tight in to flexion  Still requiring manual stretching in to flexion  Improving finger strength and mobility  Sees MD this week, will need a re-assess prior, PT will reach out to PA at MD's office for update  Patient is making progress  Still moderate-severely limited with range  Patient still is having swelling intermittently and is still compliant with her HEP  Good range with exercising, all except for flexion  Plan: Continue per plan of care        Precautions: L wrist fx with ORIF of the distal radius  AROM only L wrist x 2 weeks, then PROM  A/PROM L fingers to tolerance      Manuals  2/9 2/15 1/28 1/29   PROM when ready x60 x60' (total time) x10' flexion only x60' (total time) x60'   Graston- flexors, extension, anterior wrist, and palm of hand Not today retrograde massage x10 min NT Not needed today x15' Graston not needed today    NT  Joint mobs to improve range Joint mobs to improve range all directions AP, and superior/inf for radial and ulnar deviation Joint mobs to improve range all directions AP, and superior/inf for radial and ulnar deviation   Thumb stretching gentle Thumb PROM NT  5x:20 with finkelstein's 5x:20 with finkelstein's   Wrist joint mobs NT Throughout range, Grade II- III to improve supination, flexion and extension range  Grade II and III Throughout PROM today, Grade I, II, III to improve range-  Proximal and mid forearm               Yes today Yes today       Yes today  yes today                    Neuro Re-Ed                Ther Ex        Wrist AROM PROM of R wrist - focusing on PROM  Focusing on PROM -   WB wrist extension  NV weights and new program  UBE 5/5 NT -      Wall slides, shoulder flex 2x10 NT NT     Supination with hammer 10x  Standing flex, scap with hand wt NT        TB rows and extension  RTB 2x10        Wrist AROM 1# 2x10 extension        Rubber bands off/on- finger extension- big and small, 3x with small, finger extension;  0x with big bands        Clothes pins- red and green alternate on yellow         Supination with hammer 2x10, small hammer     Ther Activity                        Gait Training                        Modalities         MH x 10', seated, unbilled pre ex Not needed today NT NT MH x 10 unbilled

## 2021-02-16 ENCOUNTER — APPOINTMENT (OUTPATIENT)
Dept: PHYSICAL THERAPY | Age: 56
End: 2021-02-16
Payer: COMMERCIAL

## 2021-02-17 ENCOUNTER — OFFICE VISIT (OUTPATIENT)
Dept: PHYSICAL THERAPY | Age: 56
End: 2021-02-17
Payer: COMMERCIAL

## 2021-02-17 DIAGNOSIS — S52.602D CLOSED FRACTURE OF DISTAL ENDS OF LEFT RADIUS AND ULNA WITH ROUTINE HEALING, SUBSEQUENT ENCOUNTER: Primary | ICD-10-CM

## 2021-02-17 DIAGNOSIS — S52.502D CLOSED FRACTURE OF DISTAL ENDS OF LEFT RADIUS AND ULNA WITH ROUTINE HEALING, SUBSEQUENT ENCOUNTER: Primary | ICD-10-CM

## 2021-02-17 PROCEDURE — 97110 THERAPEUTIC EXERCISES: CPT | Performed by: PHYSICAL THERAPIST

## 2021-02-17 PROCEDURE — 97140 MANUAL THERAPY 1/> REGIONS: CPT | Performed by: PHYSICAL THERAPIST

## 2021-02-17 NOTE — PROGRESS NOTES
PT Re-Evaluation     Today's date: 2021  Patient name: Kristen Pichardo  : 1965  MRN: [id number]  Referring provider: Marj Valerio MD  Dx:   Encounter Diagnosis     ICD-10-CM    1  Closed fracture of distal ends of left radius and ulna with routine healing, subsequent encounter  S52 502D     S52 602D                   Assessment  Assessment details: Patient seen for PT re-assessment  Patient is making steady progress towards goals in PT, progressing with ROM  PT has been adding and progressing strengthening  Did miss ~ 1 week because of snow and poor weather; however, has been doing her exercises at home consistently  Patient continues to present with significant tightness and lacks ROM in all directions with flex, extension being the most limited  Improving finger function and finger opposition  Impairments: abnormal muscle tone, abnormal or restricted ROM, activity intolerance, impaired physical strength, lacks appropriate home exercise program, pain with function and weight-bearing intolerance  Functional limitations: Patient reports moderate limitations use of L UE since surgery as patient is fearful of movement  Understanding of Dx/Px/POC: good   Prognosis: good    Goals  Impairment Goals to be met within 4 weeks  - Decrease pain to 5/10 at rest/with activity met  - Improve ROM by 5-10 degrees wrist  met  - Return full finger AROM met- tight at thumb PIP  - Increase strength to 3+/5 throughout L wrist not met  - Improve shoulder strength to 4/5  Not met    Added:  - increase flexion ROM to 60* passively  - Increase extension AROM to 60*  - Increase supination AROM to 70*   - Reduce swelling at night- while wearing hand compression garment  Functional Goals to be met within 4-6 weeks     - Return to Prior Level of Function progressing  - Increase Functional Status Measure to: expected progressing  - Patient will be independent with HEP progressing  - Improve function and use of L hand   met      Plan  Patient would benefit from: skilled physical therapy  Planned modality interventions: cryotherapy and thermotherapy: hydrocollator packs  Planned therapy interventions: manual therapy, neuromuscular re-education, motor coordination training, patient education, postural training, strengthening, therapeutic activities, therapeutic exercise, stretching, home exercise program and body mechanics training  Frequency: 2x week  Duration in weeks: 6  Treatment plan discussed with: patient        Subjective Evaluation    History of Present Illness  Date of onset: 2020  Date of surgery: 2020  Mechanism of injury: Patient reports onset of L wrist pain s/p fall down the steps  Patient went to the ED immediately, was diagnosed with wrist fracture, splinted and referred to hand surgeon  Patient underwent L ORIF of the radius  Patient was seen post op by the surgeon and was recommended to PT/OT for wrist movement  Recommended for active wrist movement and active/passive finger movement     Pain  Current pain ratin  At best pain ratin  At worst pain rating: 3  Location: wrist  Quality: pressure  Relieving factors: heat  Aggravating factors: nothing  Progression: no change    Social Support    Employment status: not working    Diagnostic Tests  X-ray: abnormal  Treatments  No previous or current treatments  Patient Goals  Patient goals for therapy: decreased pain, increased motion, return to sport/leisure activities and increased strength          Objective     Neurological Testing     Sensation     Wrist/Hand   Left   Paresthesia: light touch    Comments   Left light touch: at times, none during evaluation     Active Range of Motion   Left Shoulder   Flexion: WFL    Right Shoulder   Flexion: WFL    Left Elbow   Flexion: 148 degrees   Extension: 0 degrees   Forearm supination: 35 degrees with pain  Forearm pronation: 90 degrees     Right Elbow   Normal active range of motion    Left Wrist Wrist flexion: 30 degrees   Wrist extension: 45 degrees   Radial deviation: 10 degrees   Ulnar deviation: 22 degrees     Right Wrist   Normal active range of motion    Left Thumb   Flexion     CMC: 20 degrees    MP: 40 degrees    DIP: 40 degrees  Extension     CMC: 32 degrees    MP: 0 degrees    DIP: 0 degrees  Opposition: Able to oppose to 3rd digit, unable to oppose to 4th or 5th digit  1/7- able to fully oppose to 5th digit    Left Digits    Flexion   Index     MCP: 66    PIP: 108    DIP: 115  Middle     MCP: 60    PIP: 105    DIP: 120  Ring     MCP: 66    PIP: 105    DIP: 120  Little     MCP: 66    PIP: 108    DIP: 120  Extension   Index     MCP: 0    PIP: 0    DIP: 0  Middle     MCP: 0    PIP: 0    DIP: 0  Ring     MCP: 0    PIP: 0    DIP: 0  Little     MCP: 0    PIP: 0    DIP: 0    Additional Active Range of Motion Details  MCP abduction and adduction WFL- mildly tight and weak s/p surgery  Finger flexion improves with repeated exercising, no pain, only pressure at wrist fracture and surgery site  Lacks thumb movement actively, d/t weakness and pressure pain from wrist surgery site  Patient has full passive thumb movement in all directions  Moderate weakness with opposition d/t muscle weakness       1/7- Has regained full finger flexion, ROM, and finger swelling, dorsum of the hand swelling has greatly reduced    Passive Range of Motion     Left Elbow   Forearm supination: 50 degrees     Left Wrist   Wrist flexion: 50 degrees   Wrist extension: 60 degrees     Strength/Myotome Testing     Left Shoulder     Planes of Motion   Flexion: 4-   Abduction: 4-     Right Shoulder     Planes of Motion   Flexion: 4   Abduction: 4     Left Elbow   Flexion: 3-  Extension: 3-  Forearm supination: 3-  Forearm pronation: 3-    Left Wrist/Hand   Wrist extension: 3  Wrist flexion: 3  Radial deviation: 3  Ulnar deviation: 3    Right Wrist/Hand   Normal wrist strength    Additional Strength Details   strength not applicable yet as patient lacking ROM at this time               Precautions: L wrist fx with ORIF of the distal radius  AROM only L wrist x 2 weeks, then PROM  A/PROM L fingers to tolerance      Manuals 2/8 2/9 2/15 1/28 1/29   PROM when ready x60 x60' (total time) x10' flexion only x60' (total time) x60'   Graston- flexors, extension, anterior wrist, and palm of hand Not today retrograde massage x10 min NT Not needed today x15' Graston not needed today    NT  Joint mobs to improve range Joint mobs to improve range all directions AP, and superior/inf for radial and ulnar deviation Joint mobs to improve range all directions AP, and superior/inf for radial and ulnar deviation   Thumb stretching gentle Thumb PROM NT  5x:20 with finkelstein's 5x:20 with finkelstein's   Wrist joint mobs NT Throughout range, Grade II- III to improve supination, flexion and extension range  Grade II and III Throughout PROM today, Grade I, II, III to improve range-  Proximal and mid forearm               Yes today Yes today       Yes today  yes today                    Neuro Re-Ed                Ther Ex        Wrist AROM PROM of R wrist - focusing on PROM  Focusing on PROM -   WB wrist extension  NV weights and new program  UBE 5/5 NT -      Wall slides, shoulder flex 2x10 NT NT     Supination with hammer 10x  Standing flex, scap with hand wt NT        TB rows and extension  RTB 2x10        Wrist AROM 1# 2x10 extension        Rubber bands off/on- finger extension- big and small, 3x with small, finger extension;  0x with big bands        Clothes pins- red and green alternate on yellow         Supination with hammer 2x10, small hammer     Ther Activity                        Gait Training                        Modalities         MH x 10', seated, unbilled pre ex Not needed today NT NT MH x 10 unbilled

## 2021-02-18 ENCOUNTER — APPOINTMENT (OUTPATIENT)
Dept: PHYSICAL THERAPY | Age: 56
End: 2021-02-18
Payer: COMMERCIAL

## 2021-02-19 ENCOUNTER — APPOINTMENT (OUTPATIENT)
Dept: PHYSICAL THERAPY | Age: 56
End: 2021-02-19
Payer: COMMERCIAL

## 2021-02-22 ENCOUNTER — APPOINTMENT (OUTPATIENT)
Dept: PHYSICAL THERAPY | Age: 56
End: 2021-02-22
Payer: COMMERCIAL

## 2021-02-23 ENCOUNTER — OFFICE VISIT (OUTPATIENT)
Dept: PHYSICAL THERAPY | Age: 56
End: 2021-02-23
Payer: COMMERCIAL

## 2021-02-23 DIAGNOSIS — S52.602D CLOSED FRACTURE OF DISTAL ENDS OF LEFT RADIUS AND ULNA WITH ROUTINE HEALING, SUBSEQUENT ENCOUNTER: Primary | ICD-10-CM

## 2021-02-23 DIAGNOSIS — S52.502D CLOSED FRACTURE OF DISTAL ENDS OF LEFT RADIUS AND ULNA WITH ROUTINE HEALING, SUBSEQUENT ENCOUNTER: Primary | ICD-10-CM

## 2021-02-23 PROCEDURE — 97140 MANUAL THERAPY 1/> REGIONS: CPT | Performed by: PHYSICAL THERAPIST

## 2021-02-23 PROCEDURE — 97110 THERAPEUTIC EXERCISES: CPT | Performed by: PHYSICAL THERAPIST

## 2021-02-23 NOTE — PROGRESS NOTES
Daily Note     Today's date: 2021  Patient name: Larry Gonzalez  : 1965  MRN: [id number]  Referring provider: Felipe Cowan MD  Dx:   Encounter Diagnosis     ICD-10-CM    1  Closed fracture of distal ends of left radius and ulna with routine healing, subsequent encounter  S52 502D     S52 602D                   Subjective: Patient reports that she missed her MD appt because of the snowy weather last week, waiting for a call back to reschedule- PT will assist and send message through Epic for office staff to call, will also update MD  Patient denies wrist pain, does feel swollen in forearm and notices a bump on the lateral aspect of her arm- appears to be soft tissue tightness, most likely fascia restrictions  Objective: See treatment diary below      Assessment: Progressed program to challenge strength and ROM  Patient has maintained ROM well since missing 2 sessions of PT d/t snowy weather  Patient still quick to compensate, needs frequent cuing and facilitation to move the wrist vs compensating, cues to flex the fingers to increase strength  Plan: Continue per plan of care        Precautions: L wrist fx with ORIF of the distal radius  AROM only L wrist x 2 weeks, then PROM  A/PROM L fingers to tolerance      Manuals  2/9 2/15 2/23 1/29   PROM when ready x60 x60' (total time) x10' flexion only x10' x60'   Graston- flexors, extension, anterior wrist, and palm of hand Not today retrograde massage x10 min NT Not needed today x5' lateral wrist area Graston not needed today    NT  Joint mobs to improve range Grade II to improve flex/ext Joint mobs to improve range all directions AP, and superior/inf for radial and ulnar deviation   Thumb stretching gentle Thumb PROM NT   5x:20 with finkelstein's   Wrist joint mobs NT Throughout range, Grade II- III to improve supination, flexion and extension range   Throughout PROM today, Grade I, II, III to improve range-  Proximal and mid forearm                                           Neuro Re-Ed                Ther Ex        Wrist AROM PROM of R wrist - focusing on PROM   -   WB wrist extension  NV weights and new program  UBE 5/5 5/5 -      Wall slides, shoulder flex 2x10 2x10 NT     Supination with hammer 10x  Standing flex, scap with hand wt NT        TB rows and extension  RTB 2x10 -       Wrist AROM 1# 2x10 extension -       Rubber bands off/on- finger extension- big and small, 3x with small, finger extension;  0x with big bands All bands off/on        Clothes pins- red and green alternate on yellow  1x around yellow [place]       Supination with hammer 2x10, small hammer 2x10 bigger hammer        Standing wrist extension stretch 5x:10        U and N red therabar 2x10 ea        Attempted wrist flex/ex with therabar, too hard, modified to twist tool for easier use 20x - to work both flex and ext                                                                    Ther Activity                        Gait Training                        Modalities         MH x 10', seated, unbilled pre ex Not needed today NT NT MH x 10 unbilled

## 2021-02-25 ENCOUNTER — OFFICE VISIT (OUTPATIENT)
Dept: PHYSICAL THERAPY | Age: 56
End: 2021-02-25
Payer: COMMERCIAL

## 2021-02-25 DIAGNOSIS — S52.602D CLOSED FRACTURE OF DISTAL ENDS OF LEFT RADIUS AND ULNA WITH ROUTINE HEALING, SUBSEQUENT ENCOUNTER: Primary | ICD-10-CM

## 2021-02-25 DIAGNOSIS — S52.502D CLOSED FRACTURE OF DISTAL ENDS OF LEFT RADIUS AND ULNA WITH ROUTINE HEALING, SUBSEQUENT ENCOUNTER: Primary | ICD-10-CM

## 2021-02-25 PROCEDURE — 97110 THERAPEUTIC EXERCISES: CPT | Performed by: PHYSICAL THERAPIST

## 2021-02-25 PROCEDURE — 97140 MANUAL THERAPY 1/> REGIONS: CPT | Performed by: PHYSICAL THERAPIST

## 2021-02-25 NOTE — PROGRESS NOTES
Daily Note     Today's date: 2021  Patient name: Anabella Guzmán  : 1965  MRN: [id number]  Referring provider: Facundo Schaffer MD  Dx:   Encounter Diagnosis     ICD-10-CM    1  Closed fracture of distal ends of left radius and ulna with routine healing, subsequent encounter  S52 502D     S52 602D                   Subjective: Patient reports that she hasn't been having wrist pain, but has been having different sensations when she's stretching at home and exercising  Objective: See treatment diary below      Assessment: Discussed patient's concerns, appears to be fascia restrictions that patient is feeling tight when she's stretching  Progressing with extension range and maintaining supination range, very tight in to flexion  Taught stretching for wrist flexion at home, patient will add this to her HEP  Progressing well with strengthening program  Improved wrist control with U and N exercise today compared to last visit  Patient has been compliant with HEP is making objective improvements in wrist ROM  Does present with fascia restriction at ulnar aspect of wrist       Plan: Continue per plan of care        Precautions: L wrist fx with ORIF of the distal radius  AROM only L wrist x 2 weeks, then PROM  A/PROM L fingers to tolerance      Manuals  2/9 2/15 2/23 2/25   PROM when ready x60 x60' (total time) x10' flexion only x10' x15   Graston- flexors, extension, anterior wrist, and palm of hand Not today retrograde massage x10 min NT Not needed today x5' lateral wrist area x5' lateral area    NT  Joint mobs to improve range Grade II to improve flex/ext Grade II for flex/ext   Thumb stretching gentle Thumb PROM NT      Wrist joint mobs NT Throughout range, Grade II- III to improve supination, flexion and extension range                                              Neuro Re-Ed                Ther Ex        Wrist AROM PROM of R wrist - focusing on PROM   -   WB wrist extension  NV weights and new program  UBE 5/5 5/5 5/5      Wall slides, shoulder flex 2x10 2x10 NT     Supination with hammer 10x  Standing flex, scap with hand wt NT        TB rows and extension  RTB 2x10 - -      Wrist AROM 1# 2x10 extension - -      Rubber bands off/on- finger extension- big and small, 3x with small, finger extension;  0x with big bands All bands off/on  -      Clothes pins- red and green alternate on yellow  1x around yellow [place] 2x      Supination with hammer 2x10, small hammer 2x10 bigger hammer 2x10       Standing wrist extension stretch 5x:10 5x:10       U and N red therabar 2x10 ea 2x10 slower       Attempted wrist flex/ex with therabar, too hard, modified to twist tool for easier use 20x - to work both flex and ext 2x10 with increased resistance                                                                   Ther Activity                        Gait Training                        Modalities         MH x 10', seated, unbilled pre ex Not needed today NT NT MH x 10 unbilled

## 2021-03-01 ENCOUNTER — OFFICE VISIT (OUTPATIENT)
Dept: PHYSICAL THERAPY | Age: 56
End: 2021-03-01
Payer: COMMERCIAL

## 2021-03-01 DIAGNOSIS — S52.602D CLOSED FRACTURE OF DISTAL ENDS OF LEFT RADIUS AND ULNA WITH ROUTINE HEALING, SUBSEQUENT ENCOUNTER: Primary | ICD-10-CM

## 2021-03-01 DIAGNOSIS — S52.502D CLOSED FRACTURE OF DISTAL ENDS OF LEFT RADIUS AND ULNA WITH ROUTINE HEALING, SUBSEQUENT ENCOUNTER: Primary | ICD-10-CM

## 2021-03-01 PROCEDURE — 97140 MANUAL THERAPY 1/> REGIONS: CPT

## 2021-03-01 PROCEDURE — 97110 THERAPEUTIC EXERCISES: CPT

## 2021-03-01 NOTE — PROGRESS NOTES
Daily Note     Today's date: 3/1/2021  Patient name: Margarete Bernheim  : 1965  MRN: [id number]  Referring provider: Jaswant Ann MD  Dx:   Encounter Diagnosis     ICD-10-CM    1  Closed fracture of distal ends of left radius and ulna with routine healing, subsequent encounter  S52 502D     S52 602D                   Subjective: Patient reports main issues are flexing her wrist and moving her thumb for opposition  Objective: See treatment diary below      Assessment: ROM limits due to muscular restriction vs pain  Encouraged pt to be diligent with home ex as ROM can come back even months after fracture  Pt doing better with light resistive ex with better effort noted  Patient has been compliant with HEP is making objective improvements in wrist ROM  Does present with fascia restriction at ulnar aspect of wrist       Plan: Continue per plan of care        Precautions: L wrist fx with ORIF of the distal radius  AROM only L wrist x 2 weeks, then PROM  A/PROM L fingers to tolerance      Manuals 3/1 2/9 2/15 2/23 2/25   PROM when ready x10m  x60' (total time) x10' flexion only x10' x15   Graston- flexors, extension, anterior wrist, and palm of hand x5 min laterla wist area NT Not needed today x5' lateral wrist area x5' lateral area    NT  Joint mobs to improve range Grade II to improve flex/ext Grade II for flex/ext   Thumb stretching  NT      Wrist joint mobs NT Throughout range, Grade II- III to improve supination, flexion and extension range                                              Neuro Re-Ed                Ther Ex        Wrist AROM PROM of R wrist x20ea - focusing on PROM   -   WB wrist extension  NV weights and new program  UBE 5/5 5/5 5/5   UBE 10m /  Wall slides, shoulder flex 2x10 2x10 NT   Wall slides x20 Supination with hammer 10x  Standing flex, scap with hand wt NT     Wrist AROM 1# wrist flexion/extension  TB rows and extension  RTB 2x10 - -   Clothes pins Red and green around yellow web disc x1  Wrist AROM 1# 2x10 extension - -   Yellow hammer- supination 2x10  Rubber bands off/on- finger extension- big and small, 3x with small, finger extension;  0x with big bands All bands off/on  -   therabar 2x10 ea U & N  Clothes pins- red and green alternate on yellow  1x around yellow [place] 2x   Black wrist twister- Flex/ext Easy 2x10  Supination with hammer 2x10, small hammer 2x10 bigger hammer 2x10       Standing wrist extension stretch 5x:10 5x:10       U and N red therabar 2x10 ea 2x10 slower       Attempted wrist flex/ex with therabar, too hard, modified to twist tool for easier use 20x - to work both flex and ext 2x10 with increased resistance                                                                   Ther Activity                        Gait Training                        Modalities         MH x 10', seated, unbilled pre ex Not needed today NT NT MH x 10 unbilled

## 2021-03-02 ENCOUNTER — OFFICE VISIT (OUTPATIENT)
Dept: PHYSICAL THERAPY | Age: 56
End: 2021-03-02
Payer: COMMERCIAL

## 2021-03-02 DIAGNOSIS — S52.602D CLOSED FRACTURE OF DISTAL ENDS OF LEFT RADIUS AND ULNA WITH ROUTINE HEALING, SUBSEQUENT ENCOUNTER: Primary | ICD-10-CM

## 2021-03-02 DIAGNOSIS — S52.502D CLOSED FRACTURE OF DISTAL ENDS OF LEFT RADIUS AND ULNA WITH ROUTINE HEALING, SUBSEQUENT ENCOUNTER: Primary | ICD-10-CM

## 2021-03-02 PROCEDURE — 97110 THERAPEUTIC EXERCISES: CPT

## 2021-03-02 PROCEDURE — 97140 MANUAL THERAPY 1/> REGIONS: CPT

## 2021-03-02 NOTE — PROGRESS NOTES
Daily Note     Today's date: 3/2/2021  Patient name: Ronald Ma  : 1965  MRN: [id number]  Referring provider: Tracey Wu MD  Dx:   Encounter Diagnosis     ICD-10-CM    1  Closed fracture of distal ends of left radius and ulna with routine healing, subsequent encounter  S52 502D     S52 602D                   Subjective: Patient still with main issues are flexing her wrist and moving her thumb for opposition  No ill effects from yesterday's tx      Objective: See treatment diary below      Assessment: ROM limits due to muscular restriction vs pain    Pt doing better with light resistive ex with better effort noted  Noted better opposition motion with thumb after session  Patient has been compliant with HEP is making objective improvements in wrist ROM  Does present with fascia restriction at ulnar aspect of wrist       Plan: Continue per plan of care        Precautions: L wrist fx with ORIF of the distal radius  AROM only L wrist x 2 weeks, then PROM  A/PROM L fingers to tolerance      Manuals 3/1 3/2 2/15 2/23 2/25   PROM when ready x10m  x10 x10' flexion only x10' x15   Graston- flexors, extension, anterior wrist, and palm of hand x5 min lateral wrist area NT Not needed today x5' lateral wrist area x5' lateral area    NT  Joint mobs to improve range Grade II to improve flex/ext Grade II for flex/ext   Thumb stretching  NT      Wrist joint mobs NT Throughout range, Grade II- III to improve supination, flexion and extension range                                              Neuro Re-Ed                Ther Ex        Wrist AROM PROM of R wrist x20ea - focusing on PROM   -   WB wrist extension  NV weights and new program  UBE 5/5 5/5 5/5   UBE 10m /  Wall slides, shoulder flex 2x10 2x10 NT   Wall slides x20 Supination with hammer 10x  Standing flex, scap with hand wt NT     Wrist AROM 1# wrist flexion/extension  TB rows and extension  RTB 2x10 - -   Clothes pins Red and green around yellow web disc x1  Wrist AROM 1# 2x10 extension - -   Yellow hammer- supination 2x10  Rubber bands off/on- finger extension- big and small, 3x with small, finger extension;  0x with big bands All bands off/on  -   therabar 2x10 ea U & N  Clothes pins- red and green alternate on yellow  1x around yellow [place] 2x   Black wrist twister- Flex/ext Easy 2x10  Supination with hammer 2x10, small hammer 2x10 bigger hammer 2x10       Standing wrist extension stretch 5x:10 5x:10       U and N red therabar 2x10 ea 2x10 slower       Attempted wrist flex/ex with therabar, too hard, modified to twist tool for easier use 20x - to work both flex and ext 2x10 with increased resistance                                                                   Ther Activity                        Gait Training                        Modalities         MH x 10', seated, unbilled pre ex Not needed today NT NT MH x 10 unbilled

## 2021-03-03 ENCOUNTER — TELEPHONE (OUTPATIENT)
Dept: OBGYN CLINIC | Facility: HOSPITAL | Age: 56
End: 2021-03-03

## 2021-03-03 NOTE — TELEPHONE ENCOUNTER
Patient sees Dr Steve Ritter for her left wrist   Daughter Zenia Fry is calling to request the OT order for her hand to be sent to [facility]      Please manually fax the order to:  Attn: Zenia Fry  Fax#[phone]  Ph# if needed #[phone]

## 2021-03-11 ENCOUNTER — APPOINTMENT (OUTPATIENT)
Dept: PHYSICAL THERAPY | Age: 56
End: 2021-03-11
Payer: COMMERCIAL

## 2021-03-15 ENCOUNTER — APPOINTMENT (OUTPATIENT)
Dept: PHYSICAL THERAPY | Age: 56
End: 2021-03-15
Payer: COMMERCIAL

## 2021-03-16 ENCOUNTER — APPOINTMENT (OUTPATIENT)
Dept: PHYSICAL THERAPY | Age: 56
End: 2021-03-16
Payer: COMMERCIAL

## 2021-03-18 ENCOUNTER — APPOINTMENT (OUTPATIENT)
Dept: PHYSICAL THERAPY | Age: 56
End: 2021-03-18
Payer: COMMERCIAL

## 2021-03-22 ENCOUNTER — APPOINTMENT (OUTPATIENT)
Dept: PHYSICAL THERAPY | Age: 56
End: 2021-03-22
Payer: COMMERCIAL

## 2021-03-23 ENCOUNTER — APPOINTMENT (OUTPATIENT)
Dept: PHYSICAL THERAPY | Age: 56
End: 2021-03-23
Payer: COMMERCIAL

## 2021-03-24 ENCOUNTER — OFFICE VISIT (OUTPATIENT)
Dept: OBGYN CLINIC | Facility: CLINIC | Age: 56
End: 2021-03-24
Payer: COMMERCIAL

## 2021-03-24 ENCOUNTER — APPOINTMENT (OUTPATIENT)
Dept: RADIOLOGY | Facility: AMBULARY SURGERY CENTER | Age: 56
End: 2021-03-24
Attending: SURGERY
Payer: COMMERCIAL

## 2021-03-24 VITALS
BODY MASS INDEX: 28.66 KG/M2 | SYSTOLIC BLOOD PRESSURE: 137 MMHG | HEART RATE: 97 BPM | WEIGHT: 146 LBS | HEIGHT: 60 IN | DIASTOLIC BLOOD PRESSURE: 81 MMHG

## 2021-03-24 DIAGNOSIS — S52.602D CLOSED FRACTURE OF DISTAL ENDS OF LEFT RADIUS AND ULNA WITH ROUTINE HEALING, SUBSEQUENT ENCOUNTER: Primary | ICD-10-CM

## 2021-03-24 DIAGNOSIS — S52.502D CLOSED FRACTURE OF DISTAL ENDS OF LEFT RADIUS AND ULNA WITH ROUTINE HEALING, SUBSEQUENT ENCOUNTER: Primary | ICD-10-CM

## 2021-03-24 DIAGNOSIS — S52.502D CLOSED FRACTURE OF DISTAL ENDS OF LEFT RADIUS AND ULNA WITH ROUTINE HEALING, SUBSEQUENT ENCOUNTER: ICD-10-CM

## 2021-03-24 DIAGNOSIS — S52.602D CLOSED FRACTURE OF DISTAL ENDS OF LEFT RADIUS AND ULNA WITH ROUTINE HEALING, SUBSEQUENT ENCOUNTER: ICD-10-CM

## 2021-03-24 PROCEDURE — 1036F TOBACCO NON-USER: CPT | Performed by: SURGERY

## 2021-03-24 PROCEDURE — 3008F BODY MASS INDEX DOCD: CPT | Performed by: SURGERY

## 2021-03-24 PROCEDURE — 73110 X-RAY EXAM OF WRIST: CPT

## 2021-03-24 PROCEDURE — 99213 OFFICE O/P EST LOW 20 MIN: CPT | Performed by: SURGERY
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
## 2021-03-25 ENCOUNTER — APPOINTMENT (OUTPATIENT)
Dept: PHYSICAL THERAPY | Age: 56
End: 2021-03-25
Payer: COMMERCIAL

## 2021-03-29 ENCOUNTER — APPOINTMENT (OUTPATIENT)
Dept: PHYSICAL THERAPY | Age: 56
End: 2021-03-29
Payer: COMMERCIAL

## 2021-03-30 ENCOUNTER — APPOINTMENT (OUTPATIENT)
Dept: PHYSICAL THERAPY | Age: 56
End: 2021-03-30
Payer: COMMERCIAL

## 2021-05-24 ENCOUNTER — OFFICE VISIT (OUTPATIENT)
Dept: OBGYN CLINIC | Facility: CLINIC | Age: 56
End: 2021-05-24
Payer: COMMERCIAL

## 2021-05-24 VITALS
WEIGHT: 146 LBS | HEIGHT: 60 IN | SYSTOLIC BLOOD PRESSURE: 131 MMHG | HEART RATE: 80 BPM | BODY MASS INDEX: 28.66 KG/M2 | DIASTOLIC BLOOD PRESSURE: 81 MMHG

## 2021-05-24 DIAGNOSIS — Z87.81 S/P ORIF (OPEN REDUCTION INTERNAL FIXATION) FRACTURE: ICD-10-CM

## 2021-05-24 DIAGNOSIS — Z98.890 S/P ORIF (OPEN REDUCTION INTERNAL FIXATION) FRACTURE: ICD-10-CM

## 2021-05-24 DIAGNOSIS — S52.502D CLOSED FRACTURE OF DISTAL ENDS OF LEFT RADIUS AND ULNA WITH ROUTINE HEALING, SUBSEQUENT ENCOUNTER: Primary | ICD-10-CM

## 2021-05-24 DIAGNOSIS — S52.602D CLOSED FRACTURE OF DISTAL ENDS OF LEFT RADIUS AND ULNA WITH ROUTINE HEALING, SUBSEQUENT ENCOUNTER: Primary | ICD-10-CM

## 2021-05-24 PROCEDURE — 1036F TOBACCO NON-USER: CPT | Performed by: SURGERY

## 2021-05-24 PROCEDURE — 99213 OFFICE O/P EST LOW 20 MIN: CPT | Performed by: SURGERY

## 2021-05-24 PROCEDURE — 3008F BODY MASS INDEX DOCD: CPT | Performed by: SURGERY
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
## 2021-09-07 NOTE — TELEPHONE ENCOUNTER
Form scanned to patient's chart  almost daily prior to incarceration 2 months ago- but recently incarcerated so has not drank >6 weeks

## 2021-09-22 ENCOUNTER — TELEPHONE (OUTPATIENT)
Dept: INTERNAL MEDICINE CLINIC | Facility: CLINIC | Age: 56
End: 2021-09-22

## 2022-03-23 ENCOUNTER — HOSPITAL ENCOUNTER (OUTPATIENT)
Dept: MAMMOGRAPHY | Facility: CLINIC | Age: 57
Discharge: HOME | End: 2022-03-23
Payer: COMMERCIAL

## 2022-03-23 VITALS — WEIGHT: 146 LBS | HEIGHT: 60 IN | BODY MASS INDEX: 28.66 KG/M2

## 2022-03-23 DIAGNOSIS — Z12.31 ENCOUNTER FOR SCREENING MAMMOGRAM FOR BREAST CANCER: ICD-10-CM

## 2022-03-23 PROCEDURE — 77067 SCR MAMMO BI INCL CAD: CPT

## 2022-03-23 PROCEDURE — 77063 BREAST TOMOSYNTHESIS BI: CPT

## 2022-03-31 ENCOUNTER — TELEPHONE (OUTPATIENT)
Dept: INTERNAL MEDICINE CLINIC | Facility: CLINIC | Age: 57
End: 2022-03-31

## 2022-03-31 ENCOUNTER — TELEPHONE (OUTPATIENT)
Dept: ADMINISTRATIVE | Facility: OTHER | Age: 57
End: 2022-03-31

## 2022-03-31 NOTE — TELEPHONE ENCOUNTER
ANA to the clinical team  I called patient to schedule a follow up with her PCP  She stated she switched PCP offices and has established care on [facility]  I will remove us as PCP

## 2022-03-31 NOTE — TELEPHONE ENCOUNTER
Upon review of the In Basket request we have noted this is a NON VALUE BASED item  We are unable to complete this request  Our team has the capability to assist in retrieval, updating, and linking of the following items: Chlamydia, CRC Cologuard, CRC Colonoscopy, CRC CT Colonography, CRC FIT/FOBT(3), CRC Sigmoidoscopy, CT Lung Screening, DEXA Scan, Diabetic Eye Exam, Diabetic Foot Exam, Hemoglobin A1c, Hepatitis C, HIV (cannot retrieve), Immunizations, Lead, Mammogram, Medicare AWV, Pap Smear (HPV),  and Urine Microalbumin  Any additional questions or concerns should be emailed to the Practice Liaisons via [email or web address] email, please do not reply via In Basket      Thank you  Amada Morales

## 2022-03-31 NOTE — TELEPHONE ENCOUNTER
----- Message from Ranjith Garcia sent at 3/31/2022 12:06 PM EDT -----  Regarding: Change of PCP  Patient has left [facility], has established care with a new PCP

## 2022-03-31 NOTE — RESULT ENCOUNTER NOTE
I called patient to schedule a follow up but she stated she switched PCP and has established care on [facility]

## 2022-11-28 ENCOUNTER — OFFICE VISIT (OUTPATIENT)
Dept: FAMILY MEDICINE CLINIC | Facility: CLINIC | Age: 57
End: 2022-11-28

## 2022-11-28 VITALS
HEART RATE: 85 BPM | RESPIRATION RATE: 20 BRPM | DIASTOLIC BLOOD PRESSURE: 80 MMHG | TEMPERATURE: 97.5 F | SYSTOLIC BLOOD PRESSURE: 145 MMHG | OXYGEN SATURATION: 99 % | HEIGHT: 60 IN | WEIGHT: 155.2 LBS | BODY MASS INDEX: 30.47 KG/M2

## 2022-11-28 DIAGNOSIS — H91.91 DECREASED HEARING, RIGHT: ICD-10-CM

## 2022-11-28 DIAGNOSIS — Z12.11 ENCOUNTER FOR SCREENING COLONOSCOPY: ICD-10-CM

## 2022-11-28 DIAGNOSIS — G89.29 CHRONIC BILATERAL LOW BACK PAIN WITH RIGHT-SIDED SCIATICA: ICD-10-CM

## 2022-11-28 DIAGNOSIS — M54.41 CHRONIC BILATERAL LOW BACK PAIN WITH RIGHT-SIDED SCIATICA: ICD-10-CM

## 2022-11-28 DIAGNOSIS — Z00.00 ANNUAL PHYSICAL EXAM: Primary | ICD-10-CM

## 2022-11-28 DIAGNOSIS — K21.9 GASTROESOPHAGEAL REFLUX DISEASE WITHOUT ESOPHAGITIS: ICD-10-CM

## 2022-11-28 RX ORDER — FAMOTIDINE 20 MG/1
20 TABLET, FILM COATED ORAL 2 TIMES DAILY
Qty: 60 TABLET | Refills: 1 | Status: SHIPPED | OUTPATIENT
Start: 2022-11-28 | End: 2022-12-28

## 2022-11-28 NOTE — ASSESSMENT & PLAN NOTE
Chronic, on lyrica 50 mg TID, taking PRN, no previous PT evaluation and treatment  Open to trying  Plan  · Referred to PT  · Discussed taking lyrica daily, can do 50 AM and 100 PM or as prescribed 50 TID  · Return in 2 weeks to monitor improvement  · Provided lower back exercises to work on for core strength and lower back pain improvement over time

## 2022-11-28 NOTE — ASSESSMENT & PLAN NOTE
Acid reflux noted in the past 3 weeks, discussed dietary changes and started on pepcid 20 mg, daily if no improvement can increase to BID  Follow up in 2 weeks

## 2022-11-28 NOTE — ASSESSMENT & PLAN NOTE
Chronic, previously seen by specialist but not found to have any concerns at that time  Referred to ENT

## 2022-12-13 ENCOUNTER — OFFICE VISIT (OUTPATIENT)
Dept: FAMILY MEDICINE CLINIC | Facility: CLINIC | Age: 57
End: 2022-12-13

## 2022-12-13 VITALS
TEMPERATURE: 97.3 F | DIASTOLIC BLOOD PRESSURE: 92 MMHG | HEART RATE: 84 BPM | SYSTOLIC BLOOD PRESSURE: 159 MMHG | OXYGEN SATURATION: 98 % | WEIGHT: 154.8 LBS | RESPIRATION RATE: 18 BRPM | BODY MASS INDEX: 30.23 KG/M2

## 2022-12-13 DIAGNOSIS — F41.8 DEPRESSION WITH ANXIETY: ICD-10-CM

## 2022-12-13 DIAGNOSIS — I15.9 SECONDARY HYPERTENSION: ICD-10-CM

## 2022-12-13 DIAGNOSIS — M54.41 CHRONIC BILATERAL LOW BACK PAIN WITH RIGHT-SIDED SCIATICA: ICD-10-CM

## 2022-12-13 DIAGNOSIS — K21.9 GASTROESOPHAGEAL REFLUX DISEASE WITHOUT ESOPHAGITIS: Primary | ICD-10-CM

## 2022-12-13 DIAGNOSIS — G89.29 CHRONIC BILATERAL LOW BACK PAIN WITH RIGHT-SIDED SCIATICA: ICD-10-CM

## 2022-12-13 NOTE — PROGRESS NOTES
Name: Ashanti Pérez      : 1965      MRN: [id number]  Encounter Provider: Nini [place] MD  Encounter Date: 2022   Encounter department: [address] [place] [address]     1  Gastroesophageal reflux disease without esophagitis  Assessment & Plan:  Improved since start of Pepcid 20 mg twice daily, continue current regimen continue avoiding certain triggers discussed with patient  2  Chronic bilateral low back pain with right-sided sciatica  Assessment & Plan:  Improved with yoga, stretching, warm baths  Continue current lifestyle modification return if worsening  Will recommend PT if returns or worsening  3  Depression with anxiety  Assessment & Plan:  Currently on Zoloft and BuSpar and possibly Wellbutrin  Prescribed outside of [facility]  We will have patient bring in medication bottles at next appointment  4  Secondary hypertension  Assessment & Plan:  Elevated blood pressures x2 recent visits likely secondary to medication induced, Wellbutrin  Unsure of which medication patient is on  Will have patient return next week with medication bottles to evaluate causing factor  If persistently elevated and less likely due to medication induced we will start patient on antihypertensives  Return in about 9 days (around 2022) for HTN (schedule for )  Subjective      Ashanti Pérez is a 53-year-old female here GERD follow up  Patient reports  Patient reports doing much better since last time  Reports going to physical therapy as she is improving at home with home exercises, soaking in warm bath  Would like to continue that instead  GERD symptoms have improved since start of Pepcid  She is taking it 2 times a day  Patient follows a psychiatrist who prescribes her medications including Wellbutrin, BuSpar, Zoloft    Patient was notified that the facility is closing down and was given 4 months of refills  Patient is currently in the midst of finding a new psychiatrist   Patient's blood pressures are elevated and may be likely due to Wellbutrin that she is on  Unsure of which exact medication she is on asked her to come back next week with all of her medication bottles to evaluate exactly the medication she is on  Review of Systems   Constitutional: Negative for chills, fever and unexpected weight change  HENT: Negative for congestion, rhinorrhea and sore throat  Eyes: Negative for visual disturbance  Respiratory: Negative for chest tightness, shortness of breath and wheezing  Cardiovascular: Negative for chest pain  Gastrointestinal: Negative for abdominal pain, constipation, diarrhea, nausea and vomiting  Endocrine: Negative for polyuria  Genitourinary: Negative for frequency  Skin: Negative for color change and rash  Neurological: Negative for dizziness, weakness, light-headedness and headaches  Psychiatric/Behavioral: Negative for confusion  Current Outpatient Medications on File Prior to Visit   Medication Sig   • ALPRAZolam (XANAX) 0 5 mg tablet Take by mouth 2 (two) times a day as needed   • buPROPion (WELLBUTRIN XL) 300 mg 24 hr tablet Take by mouth   • cholecalciferol (VITAMIN D3) 1,000 units tablet Take 1 tablet (1,000 Units total) by mouth daily   • famotidine (PEPCID) 20 mg tablet Take 1 tablet (20 mg total) by mouth 2 (two) times a day   • OLANZapine (ZyPREXA) 20 MG tablet Take 20 mg by mouth daily at bedtime     • pregabalin (LYRICA) 50 mg capsule Take 50 mg by mouth 3 (three) times a day   • sertraline (ZOLOFT) 25 mg tablet Take 25 mg by mouth daily    • busPIRone (BUSPAR) 15 mg tablet Take 15 mg by mouth 2 (two) times a day (Patient not taking: Reported on 11/28/2022)       Objective     /92   Pulse 84   Temp (!) 97 3 °F (36 3 °C)   Resp 18   Wt 70 2 kg (154 lb 12 8 oz)   LMP  (LMP Unknown)   SpO2 98%   BMI 30 23 kg/m²     Physical Exam  Vitals and nursing note reviewed  Constitutional:       General: She is not in acute distress  Appearance: Normal appearance  She is not ill-appearing, toxic-appearing or diaphoretic  HENT:      Head: Normocephalic and atraumatic  Right Ear: External ear normal       Left Ear: External ear normal       Nose: Nose normal  No congestion or rhinorrhea  Mouth/Throat:      Mouth: Mucous membranes are moist    Eyes:      General: No scleral icterus  Right eye: No discharge  Left eye: No discharge  Extraocular Movements: Extraocular movements intact  Conjunctiva/sclera: Conjunctivae normal       Pupils: Pupils are equal, round, and reactive to light  Cardiovascular:      Rate and Rhythm: Normal rate and regular rhythm  Pulses: Normal pulses  Heart sounds: Normal heart sounds  Pulmonary:      Effort: Pulmonary effort is normal  No respiratory distress  Abdominal:      Tenderness: There is no abdominal tenderness  There is no guarding  Musculoskeletal:         General: No swelling  Cervical back: Normal range of motion  Right lower leg: No edema  Left lower leg: No edema  Skin:     General: Skin is warm  Capillary Refill: Capillary refill takes less than 2 seconds  Neurological:      General: No focal deficit present  Mental Status: She is alert and oriented to person, place, and time  Psychiatric:         Mood and Affect: Mood normal          Behavior: Behavior normal          Thought Content:  Thought content normal          Judgment: Judgment normal        Grecia Caban MD

## 2022-12-13 NOTE — ASSESSMENT & PLAN NOTE
Improved since start of Pepcid 20 mg twice daily, continue current regimen continue avoiding certain triggers discussed with patient

## 2022-12-13 NOTE — ASSESSMENT & PLAN NOTE
Improved with yoga, stretching, warm baths  Continue current lifestyle modification return if worsening  Will recommend PT if returns or worsening

## 2022-12-13 NOTE — ASSESSMENT & PLAN NOTE
Currently on Zoloft and BuSpar and possibly Wellbutrin  Prescribed outside of [facility]  We will have patient bring in medication bottles at next appointment

## 2022-12-13 NOTE — ASSESSMENT & PLAN NOTE
Elevated blood pressures x2 recent visits likely secondary to medication induced, Wellbutrin  Unsure of which medication patient is on  Will have patient return next week with medication bottles to evaluate causing factor  If persistently elevated and less likely due to medication induced we will start patient on antihypertensives

## 2022-12-22 ENCOUNTER — TELEPHONE (OUTPATIENT)
Dept: FAMILY MEDICINE CLINIC | Facility: CLINIC | Age: 57
End: 2022-12-22

## 2022-12-22 NOTE — TELEPHONE ENCOUNTER
Pt called with her friend Shivam on the line for interpretation, requested to r/s appt today she had for htn with Dr Dallin Moore (dr was out sick today regardless), states she would like second opinion does not feel she needs to keep coming in for multiple appointments because she doesn't feel her BP is that high  Requested Dr Sai Bahena because she knows other patients who see him, scheduled with Dr Sai Bahena 1/13/23 FYI

## 2023-01-16 ENCOUNTER — OFFICE VISIT (OUTPATIENT)
Dept: FAMILY MEDICINE CLINIC | Facility: CLINIC | Age: 58
End: 2023-01-16

## 2023-01-16 VITALS
HEIGHT: 60 IN | OXYGEN SATURATION: 98 % | SYSTOLIC BLOOD PRESSURE: 145 MMHG | TEMPERATURE: 97.9 F | BODY MASS INDEX: 30 KG/M2 | RESPIRATION RATE: 20 BRPM | HEART RATE: 82 BPM | WEIGHT: 152.8 LBS | DIASTOLIC BLOOD PRESSURE: 83 MMHG

## 2023-01-16 DIAGNOSIS — R03.0 ELEVATED BP WITHOUT DIAGNOSIS OF HYPERTENSION: Primary | ICD-10-CM

## 2023-01-16 RX ORDER — GABAPENTIN 300 MG/1
CAPSULE ORAL
COMMUNITY
Start: 2023-01-16 | End: 2023-01-23

## 2023-01-16 RX ORDER — HYDROXYZINE HYDROCHLORIDE 25 MG/1
25 TABLET, FILM COATED ORAL 3 TIMES DAILY
COMMUNITY
Start: 2022-10-31 | End: 2023-01-23

## 2023-01-16 RX ORDER — TRAZODONE HYDROCHLORIDE 100 MG/1
TABLET ORAL
COMMUNITY
Start: 2023-01-16 | End: 2023-01-23

## 2023-01-16 NOTE — PROGRESS NOTES
Name: Chasidy Michaels      : 1965      MRN: [id number]  Encounter Provider: Jordyn Gar DO  Encounter Date: 2023   Encounter department: [address] [place] [address]  Elevated BP without diagnosis of hypertension  Assessment & Plan:  - Various reported blood pressure numbers in clinic > 140/80  -Patient has recent blood work in August with normal renal function, CBC, and TSH  -Per pt,  high blood pressure is likely due to anxiety every time to go to the clinic    -Discussed with patient about 3 different options, #1 start her on an antihypertensive agent right now, #2 we will try her 3 months DASH diet, 150 minutes of exercise weekly, #3 Home monitoring of blood pressure for 2 weeks and follow-up in clinic   Plan  -We will see patient in clinic in 2 weeks when she will bring in her blood pressure at home  -In the meantime, focus hydration, reduce salt in diet, provided DASH diet information in [place], recommend patient to start to have a structure exercise routine             Subjective      HPI   56years old female, with history of anxiety depression, fibromyalgia; presented today for for elevated blood pressure  Per chart review patient has various recorded elevated blood pressure over 140/80  She reported every time she goes to the doctor she developing his anxiety encouraged her elevated blood pressure to whitecoat hypertension  She denies visual change, chest pain, headaches  Recent blood work in 2022 within normal limit  Review of Systems   Constitutional: Negative for chills and fever  HENT: Negative for ear pain and sore throat  Eyes: Negative for pain and visual disturbance  Respiratory: Negative for cough and shortness of breath  Cardiovascular: Negative for chest pain and palpitations  Gastrointestinal: Negative for abdominal pain and vomiting  Genitourinary: Negative for dysuria and hematuria  Musculoskeletal: Negative for arthralgias and back pain  Skin: Negative for color change and rash  Neurological: Negative for seizures and syncope  All other systems reviewed and are negative  Current Outpatient Medications on File Prior to Visit   Medication Sig   • buPROPion (WELLBUTRIN XL) 300 mg 24 hr tablet Take by mouth   • busPIRone (BUSPAR) 15 mg tablet Take 15 mg by mouth 2 (two) times a day   • cholecalciferol (VITAMIN D3) 1,000 units tablet Take 1 tablet (1,000 Units total) by mouth daily   • OLANZapine (ZyPREXA) 20 MG tablet Take 20 mg by mouth daily at bedtime  • pregabalin (LYRICA) 50 mg capsule Take 50 mg by mouth 3 (three) times a day   • sertraline (ZOLOFT) 25 mg tablet Take 25 mg by mouth daily    • ALPRAZolam (XANAX) 0 5 mg tablet Take by mouth 2 (two) times a day as needed (Patient not taking: Reported on 1/16/2023)   • famotidine (PEPCID) 20 mg tablet Take 1 tablet (20 mg total) by mouth 2 (two) times a day (Patient not taking: Reported on 1/16/2023)   • gabapentin (NEURONTIN) 300 mg capsule    • hydrOXYzine HCL (ATARAX) 25 mg tablet Take 25 mg by mouth 3 (three) times a day   • traZODone (DESYREL) 100 mg tablet        Objective     /83 (BP Location: Left arm, Patient Position: Sitting, Cuff Size: Large)   Pulse 82   Temp 97 9 °F (36 6 °C) (Temporal)   Resp 20   Ht 5' (1 524 m)   Wt 69 3 kg (152 lb 12 8 oz)   LMP  (LMP Unknown)   SpO2 98%   BMI 29 84 kg/m²     Physical Exam  Constitutional:       General: She is not in acute distress  HENT:      Head: Normocephalic and atraumatic  Nose: No congestion  Eyes:      General: No scleral icterus  Neck:      Vascular: No carotid bruit  Cardiovascular:      Rate and Rhythm: Normal rate and regular rhythm  Heart sounds: No murmur heard  Pulmonary:      Effort: Pulmonary effort is normal  No respiratory distress  Abdominal:      General: There is no distension  Palpations: Abdomen is soft  Musculoskeletal:         General: No swelling or tenderness  Cervical back: No tenderness  Skin:     General: Skin is warm  Capillary Refill: Capillary refill takes less than 2 seconds  Coloration: Skin is not jaundiced  Neurological:      General: No focal deficit present  Mental Status: She is alert and oriented to person, place, and time  Cranial Nerves: No cranial nerve deficit  Motor: No weakness     Psychiatric:         Mood and Affect: Mood normal        Marquise Rosa DO

## 2023-01-16 NOTE — ASSESSMENT & PLAN NOTE
- Various reported blood pressure numbers in clinic > 140/80  -Patient has recent blood work in August with normal renal function, CBC, and TSH  -Per pt,  high blood pressure is likely due to anxiety every time to go to the clinic    -Discussed with patient about 3 different options, #1 start her on an antihypertensive agent right now, #2 we will try her 3 months DASH diet, 150 minutes of exercise weekly, #3 Home monitoring of blood pressure for 2 weeks and follow-up in clinic   Plan  -We will see patient in clinic in 2 weeks when she will bring in her blood pressure at home  -In the meantime, focus hydration, reduce salt in diet, provided DASH diet information in [place], recommend patient to start to have a structure exercise routine

## 2023-01-23 ENCOUNTER — OFFICE VISIT (OUTPATIENT)
Dept: FAMILY MEDICINE CLINIC | Facility: CLINIC | Age: 58
End: 2023-01-23

## 2023-01-23 VITALS
HEIGHT: 60 IN | WEIGHT: 147 LBS | BODY MASS INDEX: 28.86 KG/M2 | OXYGEN SATURATION: 99 % | TEMPERATURE: 98.7 F | HEART RATE: 100 BPM | SYSTOLIC BLOOD PRESSURE: 144 MMHG | RESPIRATION RATE: 21 BRPM | DIASTOLIC BLOOD PRESSURE: 82 MMHG

## 2023-01-23 DIAGNOSIS — I10 PRIMARY HYPERTENSION: Primary | ICD-10-CM

## 2023-01-23 RX ORDER — LISINOPRIL 10 MG/1
10 TABLET ORAL DAILY
Qty: 30 TABLET | Refills: 1 | Status: SHIPPED | OUTPATIENT
Start: 2023-01-23

## 2023-01-23 NOTE — PROGRESS NOTES
Name: Melia Rose      : 1965      MRN: [id number]  Encounter Provider: Yolanda Marcos DO  Encounter Date: 2023   Encounter department: [address] [place] [address]  Primary hypertension  Assessment & Plan:  - Patient reported that her home BP has consistently been in the 083 systolic and 920 diastolic  -Patient has never been stepping on blood pressure medication before  -Her TSH was normal, normal renal function per recent [address] [place] [address]  -Start patient is on 10 mg lisinopril daily today, discussed with her about the side effect of dry cough and angioedema  -Recommend patient continue monitoring her blood pressure at home, follow-up with her in 2 weeks to assess response to ACE inhibitor  Orders:  -     lisinopril (ZESTRIL) 10 mg tablet; Take 1 tablet (10 mg total) by mouth daily           Subjective      HPI   56years old female with medical conditions of vitamin D deficiency, anxiety, elevated blood pressure  Patient follow-up today with concern that her blood pressure on home monitoring has consistently been 150/100 at causing her to be concerned  She is also presented with tachycardic pulse, and she credited it to her elevated blood pressure at home patient denies any visual changes, chest pain, headaches, abdominal pains or decreased urine output  Patient reported that she has been eating less salt, and then has been reading the DASH diet information provided to her in previous visit  Review of Systems   Constitutional: Negative for chills and fever  HENT: Negative for ear pain and sore throat  Eyes: Negative for pain and visual disturbance  Respiratory: Negative for cough and shortness of breath  Cardiovascular: Negative for chest pain and palpitations  Gastrointestinal: Negative for abdominal pain and vomiting  Genitourinary: Negative for dysuria and hematuria  Musculoskeletal: Negative for arthralgias and back pain  Skin: Negative for color change and rash  Neurological: Negative for seizures and syncope  All other systems reviewed and are negative  Current Outpatient Medications on File Prior to Visit   Medication Sig   • cholecalciferol (VITAMIN D3) 1,000 units tablet Take 1 tablet (1,000 Units total) by mouth daily   • [DISCONTINUED] ALPRAZolam (XANAX) 0 5 mg tablet Take by mouth 2 (two) times a day as needed   • [DISCONTINUED] buPROPion (WELLBUTRIN XL) 300 mg 24 hr tablet Take by mouth   • [DISCONTINUED] busPIRone (BUSPAR) 15 mg tablet Take 15 mg by mouth 2 (two) times a day   • [DISCONTINUED] famotidine (PEPCID) 20 mg tablet Take 1 tablet (20 mg total) by mouth 2 (two) times a day   • [DISCONTINUED] gabapentin (NEURONTIN) 300 mg capsule    • [DISCONTINUED] hydrOXYzine HCL (ATARAX) 25 mg tablet Take 25 mg by mouth 3 (three) times a day   • [DISCONTINUED] OLANZapine (ZyPREXA) 20 MG tablet Take 20 mg by mouth daily at bedtime  • [DISCONTINUED] pregabalin (LYRICA) 50 mg capsule Take 50 mg by mouth 3 (three) times a day   • [DISCONTINUED] sertraline (ZOLOFT) 25 mg tablet Take 25 mg by mouth daily    • [DISCONTINUED] traZODone (DESYREL) 100 mg tablet        Objective     /82 (BP Location: Left arm, Patient Position: Sitting, Cuff Size: Standard)   Pulse 100   Temp 98 7 °F (37 1 °C) (Temporal)   Resp 21   Ht 5' (1 524 m)   Wt 66 7 kg (147 lb)   LMP  (LMP Unknown)   SpO2 99%   BMI 28 71 kg/m²     Physical Exam  HENT:      Head: Normocephalic  Right Ear: External ear normal       Left Ear: External ear normal       Nose: No congestion  Cardiovascular:      Rate and Rhythm: Regular rhythm  Tachycardia present  Heart sounds: No murmur heard  No gallop  Pulmonary:      Effort: No respiratory distress  Breath sounds: No wheezing  Abdominal:      General: Abdomen is flat  There is no distension  Tenderness: There is no abdominal tenderness     Musculoskeletal:         General: No swelling or deformity  Skin:     Capillary Refill: Capillary refill takes less than 2 seconds  Coloration: Skin is not jaundiced  Findings: No bruising  Neurological:      General: No focal deficit present  Mental Status: She is alert and oriented to person, place, and time  Cranial Nerves: No cranial nerve deficit     Psychiatric:         Mood and Affect: Mood normal        Marquise Rosa DO

## 2023-01-23 NOTE — ASSESSMENT & PLAN NOTE
- Patient reported that her home BP has consistently been in the 498 systolic and 013 diastolic  -Patient has never been stepping on blood pressure medication before  -Her TSH was normal, normal renal function per recent [address] [place] [address]  -Start patient is on 10 mg lisinopril daily today, discussed with her about the side effect of dry cough and angioedema  -Recommend patient continue monitoring her blood pressure at home, follow-up with her in 2 weeks to assess response to ACE inhibitor

## 2023-02-06 ENCOUNTER — OFFICE VISIT (OUTPATIENT)
Dept: FAMILY MEDICINE CLINIC | Facility: CLINIC | Age: 58
End: 2023-02-06

## 2023-02-06 VITALS
RESPIRATION RATE: 18 BRPM | OXYGEN SATURATION: 100 % | HEART RATE: 75 BPM | HEIGHT: 60 IN | TEMPERATURE: 96.3 F | BODY MASS INDEX: 28.54 KG/M2 | WEIGHT: 145.4 LBS | SYSTOLIC BLOOD PRESSURE: 138 MMHG | DIASTOLIC BLOOD PRESSURE: 80 MMHG

## 2023-02-06 DIAGNOSIS — Z12.11 SCREENING FOR COLORECTAL CANCER: ICD-10-CM

## 2023-02-06 DIAGNOSIS — Z13.820 SCREENING FOR OSTEOPOROSIS: Primary | ICD-10-CM

## 2023-02-06 DIAGNOSIS — Z12.12 SCREENING FOR COLORECTAL CANCER: ICD-10-CM

## 2023-02-06 DIAGNOSIS — I10 PRIMARY HYPERTENSION: ICD-10-CM

## 2023-02-06 PROBLEM — R03.0 ELEVATED BP WITHOUT DIAGNOSIS OF HYPERTENSION: Status: RESOLVED | Noted: 2023-01-16 | Resolved: 2023-02-06

## 2023-02-06 PROBLEM — I15.9 SECONDARY HYPERTENSION: Status: RESOLVED | Noted: 2022-12-13 | Resolved: 2023-02-06

## 2023-02-06 NOTE — PROGRESS NOTES
Name: Bakari Givens      : 1965      MRN: [id number]  Encounter Provider: Patricia Leung DO  Encounter Date: 2023   Encounter department: [address] [place] [address]     1  Screening for osteoporosis  Assessment & Plan:  - Postmenopausal, no history of hormone therapy use  - No history of falls, fracture, no loss in height     Orders:  -     DXA bone density spine hip and pelvis; Future; Expected date: 2023    2  Screening for colorectal cancer  Assessment & Plan:  - Last FOBT was ordered in  no record of results,  -Discussed different options with patient including FIT, Cologuard, colonoscopy/sigmoidoscopy  -Patient decided on the home fit test    Orders:  -     Occult Blood, Fecal Immunochemical; Future    3  Primary hypertension  Assessment & Plan:  - Blood pressure is improved on lisinopril, report her baseline at home with systolic 979 and diastolics 54-90  -Patient reports she is also has been exercising by walking more often and had lost 2 pounds in the past couple week  -She denied dry cough, facial swelling, palpitation  Plan  Follow-up with patient in 3 months  Continue taking medications current dosage  Encourage continuations of lifestyle modification with exercise and diet             Subjective      HPI   13years old female history of depression and anxiety, fibromyalgia, diagnosed with primary hypertension, present today for blood pressure follow-up  Patient has been taking lisinopril 10 mg with no side effects, tolerating medications well blood pressure responded appropriately, blood pressure monitoring at home 206N systolic and 74O diastolic range  He has been losing weight on purpose  Denies chest pain, headaches, visual change  Review of Systems   Constitutional: Negative for chills and fever  HENT: Negative for ear pain and sore throat  Eyes: Negative for pain and visual disturbance     Respiratory: Negative for cough and shortness of breath  Cardiovascular: Negative for chest pain and palpitations  Gastrointestinal: Negative for abdominal pain and vomiting  Genitourinary: Negative for dysuria and hematuria  Musculoskeletal: Negative for arthralgias and back pain  Skin: Negative for color change and rash  Neurological: Negative for seizures and syncope  Psychiatric/Behavioral: Negative for agitation and behavioral problems  All other systems reviewed and are negative  Current Outpatient Medications on File Prior to Visit   Medication Sig   • cholecalciferol (VITAMIN D3) 1,000 units tablet Take 1 tablet (1,000 Units total) by mouth daily   • lisinopril (ZESTRIL) 10 mg tablet Take 1 tablet (10 mg total) by mouth daily       Objective     /80 (BP Location: Left arm, Patient Position: Sitting, Cuff Size: Standard)   Pulse 75   Temp (!) 96 3 °F (35 7 °C) (Temporal)   Resp 18   Ht 5' (1 524 m)   Wt 66 kg (145 lb 6 4 oz)   LMP  (LMP Unknown)   SpO2 100%   BMI 28 40 kg/m²     Physical Exam  Constitutional:       General: She is not in acute distress  Appearance: She is not toxic-appearing  HENT:      Head: Normocephalic and atraumatic  Right Ear: External ear normal       Left Ear: External ear normal       Nose: No congestion or rhinorrhea  Eyes:      General: No scleral icterus  Cardiovascular:      Rate and Rhythm: Normal rate and regular rhythm  Heart sounds: No murmur heard  Pulmonary:      Effort: Pulmonary effort is normal  No respiratory distress  Breath sounds: No wheezing  Abdominal:      General: Abdomen is flat  There is no distension  Palpations: Abdomen is soft  There is no mass  Tenderness: There is no abdominal tenderness  Hernia: No hernia is present  Musculoskeletal:         General: No swelling or tenderness  Cervical back: No rigidity  Skin:     General: Skin is warm        Capillary Refill: Capillary refill takes less than 2 seconds  Coloration: Skin is not jaundiced  Neurological:      General: No focal deficit present  Mental Status: She is alert     Psychiatric:         Mood and Affect: Mood normal        Marquise Rosa DO

## 2023-02-06 NOTE — ASSESSMENT & PLAN NOTE
- Last FOBT was ordered in 2014 no record of results,  -Discussed different options with patient including FIT, Cologuard, colonoscopy/sigmoidoscopy    -Patient decided on the home fit test

## 2023-02-06 NOTE — ASSESSMENT & PLAN NOTE
- Blood pressure is improved on lisinopril, report her baseline at home with systolic 858 and diastolics 62-79  -Patient reports she is also has been exercising by walking more often and had lost 2 pounds in the past couple week  -She denied dry cough, facial swelling, palpitation    Plan  Follow-up with patient in 3 months  Continue taking medications current dosage  Encourage continuations of lifestyle modification with exercise and diet

## 2023-02-06 NOTE — ASSESSMENT & PLAN NOTE
- Postmenopausal, no history of hormone therapy use  - No history of falls, fracture, no loss in height

## 2023-02-25 DIAGNOSIS — I10 PRIMARY HYPERTENSION: ICD-10-CM

## 2023-02-27 RX ORDER — LISINOPRIL 10 MG/1
TABLET ORAL
Qty: 30 TABLET | Refills: 1 | Status: SHIPPED | OUTPATIENT
Start: 2023-02-27

## 2023-03-30 DIAGNOSIS — I10 PRIMARY HYPERTENSION: ICD-10-CM

## 2023-03-30 RX ORDER — LISINOPRIL 10 MG/1
10 TABLET ORAL DAILY
Qty: 30 TABLET | Refills: 1 | Status: SHIPPED | OUTPATIENT
Start: 2023-03-30

## 2023-04-03 ENCOUNTER — TELEPHONE (OUTPATIENT)
Dept: FAMILY MEDICINE CLINIC | Facility: CLINIC | Age: 58
End: 2023-04-03

## 2023-04-03 DIAGNOSIS — Z12.39 SCREENING FOR BREAST CANCER: Primary | ICD-10-CM

## 2023-04-03 NOTE — TELEPHONE ENCOUNTER
Patient has scheduled mammogram appt tomorrow at 130pm  She needs a new script entered       Please order new mammogram

## 2023-04-04 ENCOUNTER — HOSPITAL ENCOUNTER (OUTPATIENT)
Dept: MAMMOGRAPHY | Facility: CLINIC | Age: 58
Discharge: HOME | End: 2023-04-04

## 2023-04-04 DIAGNOSIS — Z12.39 SCREENING FOR BREAST CANCER: ICD-10-CM

## 2023-04-07 PROBLEM — Z12.11 SCREENING FOR COLORECTAL CANCER: Status: RESOLVED | Noted: 2023-02-06 | Resolved: 2023-04-07

## 2023-04-07 PROBLEM — Z12.12 SCREENING FOR COLORECTAL CANCER: Status: RESOLVED | Noted: 2023-02-06 | Resolved: 2023-04-07

## 2023-04-07 PROBLEM — Z13.820 SCREENING FOR OSTEOPOROSIS: Status: RESOLVED | Noted: 2023-02-06 | Resolved: 2023-04-07

## 2023-04-26 ENCOUNTER — OFFICE VISIT (OUTPATIENT)
Dept: DENTISTRY | Facility: CLINIC | Age: 58
End: 2023-04-26

## 2023-04-26 VITALS — DIASTOLIC BLOOD PRESSURE: 82 MMHG | SYSTOLIC BLOOD PRESSURE: 144 MMHG | HEART RATE: 88 BPM

## 2023-04-26 DIAGNOSIS — Z01.21 ENCOUNTER FOR DENTAL EXAMINATION AND CLEANING WITH ABNORMAL FINDINGS: Primary | ICD-10-CM

## 2023-04-26 RX ORDER — OLANZAPINE 20 MG/1
20 TABLET ORAL
COMMUNITY
Start: 2023-03-20

## 2023-04-26 RX ORDER — TRAZODONE HYDROCHLORIDE 100 MG/1
100 TABLET ORAL
COMMUNITY
Start: 2023-03-20

## 2023-04-26 RX ORDER — GABAPENTIN 300 MG/1
300 CAPSULE ORAL 3 TIMES DAILY
COMMUNITY
Start: 2023-03-20

## 2023-04-26 RX ORDER — BUSPIRONE HYDROCHLORIDE 15 MG/1
TABLET ORAL
COMMUNITY
Start: 2023-03-20

## 2023-04-26 NOTE — DENTAL PROCEDURE DETAILS
"Phyllis Caldwell presents for a Comprehensive exam  Verbal consent for treatment given in addition to the forms  Reviewed health history - Patient is ASA II  Consents signed: Yes     Perio: Generalized  Pain Scale: 0  Caries Assessment: Medium  Radiographs: Complete mouth series     Oral Hygiene instruction reviewed and given  Recommended Hygiene recall visits with the Marco Velasco  Comprehensive Exam with [address] [place] [address]  Reviewed hhx  ASA - II  Pain 0/10  CC: \" I know I need a deep cleaning and I want to see if insurance will cover it  \"    [address] [place] [address] obtained    Perio charting completed  Perio findings: 3C  Advanced perio with BOP in all quads  Explained perio disease and the need for SRPs  Dr Barrios completed MATTHEW  -OCS - No findings  -No decay detected  -#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

## 2023-05-11 ENCOUNTER — CONSULT (OUTPATIENT)
Dept: MULTI SPECIALTY CLINIC | Facility: CLINIC | Age: 58
End: 2023-05-11

## 2023-05-11 VITALS
DIASTOLIC BLOOD PRESSURE: 79 MMHG | BODY MASS INDEX: 28.47 KG/M2 | TEMPERATURE: 97.3 F | WEIGHT: 145 LBS | SYSTOLIC BLOOD PRESSURE: 131 MMHG | HEART RATE: 80 BPM | HEIGHT: 60 IN

## 2023-05-11 DIAGNOSIS — H91.91 DECREASED HEARING, RIGHT: ICD-10-CM
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
## 2023-05-15 ENCOUNTER — OFFICE VISIT (OUTPATIENT)
Dept: FAMILY MEDICINE CLINIC | Facility: CLINIC | Age: 58
End: 2023-05-15

## 2023-05-15 VITALS
OXYGEN SATURATION: 99 % | RESPIRATION RATE: 18 BRPM | WEIGHT: 145.8 LBS | SYSTOLIC BLOOD PRESSURE: 134 MMHG | HEART RATE: 70 BPM | BODY MASS INDEX: 28.47 KG/M2 | TEMPERATURE: 97.3 F | DIASTOLIC BLOOD PRESSURE: 80 MMHG

## 2023-05-15 DIAGNOSIS — I10 PRIMARY HYPERTENSION: Primary | ICD-10-CM

## 2023-05-15 DIAGNOSIS — E55.9 VITAMIN D DEFICIENCY: ICD-10-CM

## 2023-05-15 RX ORDER — MELATONIN
1000 DAILY
Qty: 90 TABLET | Refills: 3 | Status: SHIPPED | OUTPATIENT
Start: 2023-05-15

## 2023-05-15 NOTE — PROGRESS NOTES
Name: Ammon Nino      : 1965      MRN: [id number]  Encounter Provider: Hafsa Lima DO  Encounter Date: 5/15/2023   Encounter department: [address] [place] [address]  Primary hypertension  Assessment & Plan:  - Follow up today with good BP home log  - No acute concern on Lisinopril 10 mg  Plan  -Encouraging patient to continue with her current doses of lisinopril 10 mg daily  -I once again emphasized importance exercise and that would help decreasing medication usage, BMI follow up plan discussed   BMI Counseling: Body mass index is 28 47 kg/m²  The BMI is above normal  Nutrition recommendations include reducing portion sizes and decreasing overall calorie intake  Exercise recommendations include moderate aerobic physical activity for 150 minutes/week  Subjective      HPI   66years old female, recent diagnosis of primary hypertension; present today for blood pressure follow-up  Patient and/or her blood pressure has not been well controlled, on lisinopril 10 mg daily  Denies side effects, patient has not make a lot of effort until exercise and curbing her diet  She denied chest pain, headaches, change in vision  Review of Systems   Constitutional: Negative for chills and fever  HENT: Negative for ear pain and sore throat  Eyes: Negative for pain and visual disturbance  Respiratory: Negative for cough and shortness of breath  Cardiovascular: Negative for chest pain and palpitations  Gastrointestinal: Negative for abdominal pain and vomiting  Genitourinary: Negative for dysuria and hematuria  Musculoskeletal: Negative for arthralgias and back pain  Skin: Negative for color change and rash  Neurological: Negative for seizures and syncope  Psychiatric/Behavioral: Negative for agitation and behavioral problems  All other systems reviewed and are negative        Current Outpatient Medications on File Prior to Visit Medication Sig   • lisinopril (ZESTRIL) 10 mg tablet Take 1 tablet (10 mg total) by mouth daily   • [DISCONTINUED] cholecalciferol (VITAMIN D3) 1,000 units tablet Take 1 tablet (1,000 Units total) by mouth daily   • busPIRone (BUSPAR) 15 mg tablet take 1 tablet by mouth three times a day TOME 1 PASTILLA YOUNG RUIZ AL ARJUN FOR ANXIETY (Patient not taking: Reported on 5/15/2023)   • gabapentin (NEURONTIN) 300 mg capsule Take 300 mg by mouth 3 (three) times a day (Patient not taking: Reported on 5/15/2023)   • OLANZapine (ZyPREXA) 20 MG tablet Take 20 mg by mouth daily at bedtime (Patient not taking: Reported on 5/15/2023)   • traZODone (DESYREL) 100 mg tablet Take 100 mg by mouth daily at bedtime as needed (Patient not taking: Reported on 5/15/2023)       Objective     /80   Pulse 70   Temp (!) 97 3 °F (36 3 °C)   Resp 18   Wt 66 1 kg (145 lb 12 8 oz)   LMP  (LMP Unknown)   SpO2 99%   BMI 28 47 kg/m²     Physical Exam  Constitutional:       General: She is not in acute distress  Appearance: She is not toxic-appearing  HENT:      Head: Normocephalic and atraumatic  Right Ear: External ear normal       Left Ear: External ear normal       Nose: No congestion  Mouth/Throat:      Pharynx: No oropharyngeal exudate  Eyes:      General: No scleral icterus  Cardiovascular:      Rate and Rhythm: Normal rate and regular rhythm  Heart sounds: No murmur heard  Pulmonary:      Effort: No respiratory distress  Abdominal:      General: Abdomen is flat  There is no distension  Palpations: Abdomen is soft  Musculoskeletal:         General: No swelling or deformity  Cervical back: No rigidity  Lymphadenopathy:      Cervical: No cervical adenopathy  Skin:     Capillary Refill: Capillary refill takes less than 2 seconds  Coloration: Skin is not jaundiced  Neurological:      General: No focal deficit present        Mental Status: She is alert and oriented to person, place, and time       Cranial Nerves: No cranial nerve deficit  Motor: No weakness     Psychiatric:         Mood and Affect: Mood normal          Behavior: Behavior normal        Marquise Rosa DO

## 2023-05-15 NOTE — ASSESSMENT & PLAN NOTE
- Follow up today with good BP home log  - No acute concern on Lisinopril 10 mg  Plan  -Encouraging patient to continue with her current doses of lisinopril 10 mg daily  -I once again emphasized importance exercise and that would help decreasing medication usage, BMI follow up plan discussed   BMI Counseling: Body mass index is 28 47 kg/m²  The BMI is above normal  Nutrition recommendations include reducing portion sizes and decreasing overall calorie intake  Exercise recommendations include moderate aerobic physical activity for 150 minutes/week

## 2023-05-22 DIAGNOSIS — I10 PRIMARY HYPERTENSION: ICD-10-CM

## 2023-05-22 RX ORDER — LISINOPRIL 10 MG/1
TABLET ORAL
Qty: 30 TABLET | Refills: 1 | Status: SHIPPED | OUTPATIENT
Start: 2023-05-22

## 2023-06-02 ENCOUNTER — OFFICE VISIT (OUTPATIENT)
Dept: FAMILY MEDICINE CLINIC | Facility: CLINIC | Age: 58
End: 2023-06-02

## 2023-06-02 VITALS
OXYGEN SATURATION: 100 % | TEMPERATURE: 97.3 F | HEART RATE: 63 BPM | DIASTOLIC BLOOD PRESSURE: 83 MMHG | SYSTOLIC BLOOD PRESSURE: 126 MMHG | BODY MASS INDEX: 28.4 KG/M2 | WEIGHT: 145.4 LBS | RESPIRATION RATE: 18 BRPM

## 2023-06-02 DIAGNOSIS — I10 PRIMARY HYPERTENSION: Primary | ICD-10-CM

## 2023-06-02 NOTE — PROGRESS NOTES
Name: Jose M Forte      : 1965      MRN: [id number]  Encounter Provider: Eugenie Sandoval DO  Encounter Date: 2023   Encounter department: [address] [place] [address]  Primary hypertension  Assessment & Plan:   - Well controlled on Zestril, range in 120-135 SBP at home   - Pt also doing more walking on daily basis   - Continue with lifestyle management and keep on the same dose of Zestril (10 mg QD)              Subjective      HPI   65 y/o f, presented for BP follow up  Well control on current dose of Lisinopril; BP at home in 120-130 range  Pt reported she has been walking more; now that she has more time for herself (no longer need to take care of her grandkids)  She denied vision change, headache  Review of Systems   Constitutional: Negative for chills and fever  HENT: Negative for ear pain and sore throat  Eyes: Negative for pain and visual disturbance  Respiratory: Negative for cough and shortness of breath  Cardiovascular: Negative for chest pain and palpitations  Gastrointestinal: Negative for abdominal pain and vomiting  Genitourinary: Negative for dysuria and hematuria  Musculoskeletal: Negative for arthralgias and back pain  Skin: Negative for color change and rash  Neurological: Negative for seizures and syncope  All other systems reviewed and are negative        Current Outpatient Medications on File Prior to Visit   Medication Sig   • cholecalciferol (VITAMIN D3) 1,000 units tablet Take 1 tablet (1,000 Units total) by mouth daily   • lisinopril (ZESTRIL) 10 mg tablet take 1 tablet by mouth once daily   • busPIRone (BUSPAR) 15 mg tablet take 1 tablet by mouth three times a day TOME 1 ROME HIGGINS ARJUN FOR ANXIETY (Patient not taking: Reported on 5/15/2023)   • gabapentin (NEURONTIN) 300 mg capsule Take 300 mg by mouth 3 (three) times a day (Patient not taking: Reported on 5/15/2023)   • OLANZapine (ZyPREXA) 20 MG tablet Take 20 mg by mouth daily at bedtime (Patient not taking: Reported on 5/15/2023)   • traZODone (DESYREL) 100 mg tablet Take 100 mg by mouth daily at bedtime as needed (Patient not taking: Reported on 5/15/2023)       Objective     /83   Pulse 63   Temp (!) 97 3 °F (36 3 °C)   Resp 18   Wt 66 kg (145 lb 6 4 oz)   LMP 01/01/2012   SpO2 100%   BMI 28 40 kg/m²     Physical Exam  HENT:      Head: Normocephalic and atraumatic  Right Ear: External ear normal       Left Ear: External ear normal       Nose: No congestion  Eyes:      General: No scleral icterus  Right eye: No discharge  Left eye: No discharge  Cardiovascular:      Rate and Rhythm: Normal rate and regular rhythm  Heart sounds: No murmur heard  No gallop  Pulmonary:      Effort: No respiratory distress  Breath sounds: No wheezing or rales  Abdominal:      General: There is no distension  Palpations: There is no mass  Tenderness: There is no abdominal tenderness  Musculoskeletal:         General: No swelling  Cervical back: No rigidity  Lymphadenopathy:      Cervical: No cervical adenopathy  Skin:     General: Skin is warm  Neurological:      General: No focal deficit present  Mental Status: She is alert  Cranial Nerves: No cranial nerve deficit     Psychiatric:         Mood and Affect: Mood normal          Behavior: Behavior normal        Marquise Rosa DO

## 2023-06-02 NOTE — LETTER
June 2, 2023     Patient: Jeanie Jara  YOB: 1965  Date of Visit: 6/2/2023      To Whom it May Concern: The pt above is following our office for conditions of fibromyalgia and lumbar and cervical spondylosis  She is being managed with oral pain medication and required home exercise  If you have any questions or concerns, please don't hesitate to call           Sincerely,          Marquise Rosa DO        CC: No Recipients

## 2023-06-02 NOTE — ASSESSMENT & PLAN NOTE
- Well controlled on Zestril, range in 120-135 SBP at home   - Pt also doing more walking on daily basis   - Continue with lifestyle management and keep on the same dose of Zestril (10 mg QD)

## 2023-07-23 DIAGNOSIS — I10 PRIMARY HYPERTENSION: ICD-10-CM

## 2023-07-24 RX ORDER — LISINOPRIL 10 MG/1
TABLET ORAL
Qty: 30 TABLET | Refills: 1 | Status: SHIPPED | OUTPATIENT
Start: 2023-07-24 | End: 2023-07-28 | Stop reason: SDUPTHER

## 2023-07-28 DIAGNOSIS — I10 PRIMARY HYPERTENSION: ICD-10-CM

## 2023-07-28 RX ORDER — LISINOPRIL 10 MG/1
10 TABLET ORAL DAILY
Qty: 30 TABLET | Refills: 1 | Status: SHIPPED | OUTPATIENT
Start: 2023-07-28

## 2023-08-02 ENCOUNTER — OFFICE VISIT (OUTPATIENT)
Dept: DENTISTRY | Facility: CLINIC | Age: 58
End: 2023-08-02

## 2023-08-02 VITALS — HEART RATE: 78 BPM | DIASTOLIC BLOOD PRESSURE: 82 MMHG | SYSTOLIC BLOOD PRESSURE: 133 MMHG

## 2023-08-02 DIAGNOSIS — K05.30 PERIODONTITIS: Primary | ICD-10-CM

## 2023-08-02 PROCEDURE — D4341 PERIODONTAL SCALING AND ROOT PLANING - 4 OR MORE TEETH PER QUADRANT: HCPCS

## 2023-08-02 NOTE — DENTAL PROCEDURE DETAILS
SRP in quads :UR    Reviewed meds/hhx in Epic  ASA: 2    Anesthesia-  Topical gel benzocaine 20% was applied to tissue. Maxillary Right buccal infiltrations,  short, 1 carp 4% Septo w/ epi 1:100,000  Neg aspirations and no complications for all. Cavitron and hand instruments used  Bleeding: heavy  Supra/sub calculus was removed from tooth surfaces  Irrigated w/ .12% Chlorhexidine Gluconate    OHI: reviewed with the patient the need to maintain proper oral hygiene regimen at home. Brushing 2x/day for 2 minutes, flossing subgingivally daily and mouthrinse 1-2x/day for 60 seconds. Recommend otc pain reliever when the patient gets home prior to local anesthesia wearing off. Recommend warm salt water rinses. Recommend excellent homecare regimen especially on sides post SRP. Advised patient periodontal disease is an oral disease we can treat and maintain but cannot cure. Without proper dental visits and proper at home dental care this disease may return. Patient understands. Following scaling and root planing, a 4-6 week Perio Re-eval is recommended, to evaluate whether patient will need further periodontal surgery. Recommend maintaining 3/4 month recalls.     NV: SRP LR

## 2023-08-15 ENCOUNTER — OFFICE VISIT (OUTPATIENT)
Dept: FAMILY MEDICINE CLINIC | Facility: CLINIC | Age: 58
End: 2023-08-15

## 2023-08-15 VITALS
HEIGHT: 60 IN | OXYGEN SATURATION: 99 % | RESPIRATION RATE: 16 BRPM | WEIGHT: 147.4 LBS | BODY MASS INDEX: 28.94 KG/M2 | HEART RATE: 102 BPM | SYSTOLIC BLOOD PRESSURE: 135 MMHG | DIASTOLIC BLOOD PRESSURE: 82 MMHG | TEMPERATURE: 98 F

## 2023-08-15 DIAGNOSIS — M54.50 ACUTE MIDLINE LOW BACK PAIN WITHOUT SCIATICA: Primary | ICD-10-CM

## 2023-08-15 PROCEDURE — 3075F SYST BP GE 130 - 139MM HG: CPT | Performed by: FAMILY MEDICINE

## 2023-08-15 PROCEDURE — 99213 OFFICE O/P EST LOW 20 MIN: CPT | Performed by: FAMILY MEDICINE

## 2023-08-15 PROCEDURE — 3079F DIAST BP 80-89 MM HG: CPT | Performed by: FAMILY MEDICINE

## 2023-08-15 RX ORDER — METHOCARBAMOL 500 MG/1
500 TABLET, FILM COATED ORAL 3 TIMES DAILY
Qty: 42 TABLET | Refills: 0 | Status: SHIPPED | OUTPATIENT
Start: 2023-08-15

## 2023-08-15 NOTE — PROGRESS NOTES
Name: Alisa Webber      : 1965      MRN: [id number]  Encounter Provider: Dax Anthony DO  Encounter Date: 8/15/2023   Encounter department: [address]     1. Acute midline low back pain without sciatica  Assessment & Plan:  - 2 weeks of sx, no pain with urination, denied trauma, night sweat, unexplained weight loss. No saddle anesthesia, no GI/ incontinence   - Pt reported minimal water intake. She also endorsed repeated bending activity playing with her grand children   - She has not tried any OTC for pain  - PX: tender on palpation, along paraspinal region T12-L3 region, negative straight leg raise; suggestive for MSK causes  Plan  - Recommend home stretch exercise, hydration, NSAIDs for pain  - Trial 2 weeks of Robaxin   - Will consider PT/OMT if sx persist   - Follow up as needed     Orders:  -     methocarbamol (ROBAXIN) 500 mg tablet; Take 1 tablet (500 mg total) by mouth 3 (three) times a day           Subjective      HPI   66 y.o f with hxo HTN, Anxiety; presented today with c/o acute mid back pain. Sx of tightness/throbbing pain started at mid back region onset 2 weeks ago; said she was playing with her grand children. She denied recent sx of fever, night sweat, dysuria; or unexplained weight loss. She also denied GI/ incontinence, no recent trauma to the affected region. Pt has not tried any OTC analgesics for pain. Review of Systems   Constitutional: Negative for chills and fever. HENT: Negative for ear pain and sore throat. Eyes: Negative for pain and visual disturbance. Respiratory: Negative for cough and shortness of breath. Cardiovascular: Negative for chest pain and palpitations. Gastrointestinal: Negative for abdominal pain and vomiting. Genitourinary: Negative for dysuria and hematuria. Musculoskeletal: Positive for back pain. Negative for arthralgias and gait problem.    Skin: Negative for color change and rash. Neurological: Negative for seizures and syncope. All other systems reviewed and are negative. Current Outpatient Medications on File Prior to Visit   Medication Sig   • cholecalciferol (VITAMIN D3) 1,000 units tablet Take 1 tablet (1,000 Units total) by mouth daily   • lisinopril (ZESTRIL) 10 mg tablet Take 1 tablet (10 mg total) by mouth daily   • busPIRone (BUSPAR) 15 mg tablet take 1 tablet by mouth three times a day TOME 1 PASTILLA YOUNG RUIZ AL ARJUN FOR ANXIETY (Patient not taking: Reported on 5/15/2023)   • gabapentin (NEURONTIN) 300 mg capsule Take 300 mg by mouth 3 (three) times a day (Patient not taking: Reported on 5/15/2023)   • OLANZapine (ZyPREXA) 20 MG tablet Take 20 mg by mouth daily at bedtime (Patient not taking: Reported on 5/15/2023)   • traZODone (DESYREL) 100 mg tablet Take 100 mg by mouth daily at bedtime as needed (Patient not taking: Reported on 5/15/2023)       Objective     /82 (BP Location: Left arm, Patient Position: Sitting, Cuff Size: Standard)   Pulse 102   Temp 98 °F (36.7 °C) (Temporal)   Resp 16   Ht 5' (1.524 m)   Wt 66.9 kg (147 lb 6.4 oz)   LMP 01/01/2012   SpO2 99%   BMI 28.79 kg/m²     Physical Exam  Constitutional:       General: She is not in acute distress. HENT:      Head: Normocephalic and atraumatic. Right Ear: External ear normal.      Left Ear: External ear normal.   Eyes:      General: No scleral icterus. Cardiovascular:      Rate and Rhythm: Normal rate and regular rhythm. Heart sounds: Normal heart sounds. No murmur heard. Pulmonary:      Effort: No respiratory distress. Abdominal:      General: There is no distension. Palpations: There is no mass. Musculoskeletal:         General: Tenderness present. Cervical back: No rigidity. Comments: T12-L3 region, TTP, hypertonic   Negative Jacob signs   Skin:     Capillary Refill: Capillary refill takes less than 2 seconds. Coloration: Skin is not jaundiced. Neurological:      General: No focal deficit present. Mental Status: She is alert and oriented to person, place, and time. Sensory: No sensory deficit. Motor: No weakness.    Psychiatric:         Mood and Affect: Mood normal.       Marquise Rosa, DO

## 2023-08-15 NOTE — ASSESSMENT & PLAN NOTE
- 2 weeks of sx, no pain with urination, denied trauma, night sweat, unexplained weight loss. No saddle anesthesia, no GI/ incontinence   - Pt reported minimal water intake.  She also endorsed repeated bending activity playing with her grand children   - She has not tried any OTC for pain  - PX: tender on palpation, along paraspinal region T12-L3 region, negative straight leg raise; suggestive for MSK causes  Plan  - Recommend home stretch exercise, hydration, NSAIDs for pain  - Trial 2 weeks of Robaxin   - Will consider PT/OMT if sx persist   - Follow up as needed

## 2023-08-16 ENCOUNTER — TELEPHONE (OUTPATIENT)
Dept: OTHER | Facility: OTHER | Age: 58
End: 2023-08-16

## 2023-08-16 ENCOUNTER — HOSPITAL ENCOUNTER (OUTPATIENT)
Dept: RADIOLOGY | Age: 58
Discharge: HOME | End: 2023-08-16
Payer: COMMERCIAL

## 2023-08-16 DIAGNOSIS — Z13.820 SCREENING FOR OSTEOPOROSIS: ICD-10-CM

## 2023-08-16 PROCEDURE — 77080 DXA BONE DENSITY AXIAL: CPT

## 2023-08-17 NOTE — TELEPHONE ENCOUNTER
Pt called, returning missed call. A call back was requested from office at [facility].  Please assist

## 2023-09-06 ENCOUNTER — OFFICE VISIT (OUTPATIENT)
Dept: DENTISTRY | Facility: CLINIC | Age: 58
End: 2023-09-06

## 2023-09-06 VITALS — SYSTOLIC BLOOD PRESSURE: 120 MMHG | DIASTOLIC BLOOD PRESSURE: 76 MMHG | HEART RATE: 91 BPM

## 2023-09-06 DIAGNOSIS — K05.30 PERIODONTITIS: Primary | ICD-10-CM

## 2023-09-06 PROCEDURE — D4341 PERIODONTAL SCALING AND ROOT PLANING - 4 OR MORE TEETH PER QUADRANT: HCPCS

## 2023-09-06 NOTE — DENTAL PROCEDURE DETAILS
SRP in quads : LR (double hygiene)    Reviewed meds/hhx in [facility]  ASA: 2    Anesthesia-  Topical gel benzocaine 20% was applied to tissue. R IANB and LB, short, 1 carp 2% Lido w/ epi 1:100,000. Neg aspirations and no complications for all. Cavitron and hand instruments used  Bleeding: heavy  Supra/sub calculus was removed from tooth surfaces  Irrigated w/ .12% Chlorhexidine Gluconate    OHI: reviewed with the patient the need to maintain proper oral hygiene regimen at home. Brushing 2x/day for 2 minutes, flossing subgingivally daily and mouthrinse 1-2x/day for 60 seconds. Recommend otc pain reliever when the patient gets home prior to local anesthesia wearing off. Recommend warm salt water rinses. Recommend excellent homecare regimen especially on areas post SRP. Advised patient periodontal disease is an oral disease we can treat and maintain but cannot cure. Without proper dental visits and proper at home dental care this disease may return. Patient understands. Following all scaling and root planing, pt will return for 12 weeks perio maint. If healing is sufficient, the patient will then continue on 3 month periodontal maintenance appointments. If healing is insufficient, pt may need to be assessed for gingival flap surgery.       NV: UL SRP  2) LL SRP

## 2023-09-13 ENCOUNTER — VBI (OUTPATIENT)
Dept: ADMINISTRATIVE | Facility: OTHER | Age: 58
End: 2023-09-13

## 2023-09-22 DIAGNOSIS — I10 PRIMARY HYPERTENSION: ICD-10-CM

## 2023-09-22 RX ORDER — LISINOPRIL 10 MG/1
10 TABLET ORAL DAILY
Qty: 30 TABLET | Refills: 1 | Status: SHIPPED | OUTPATIENT
Start: 2023-09-22

## 2023-10-21 RX ORDER — BUSPIRONE HYDROCHLORIDE 15 MG/1
TABLET ORAL
Qty: 90 TABLET | OUTPATIENT
Start: 2023-10-21

## 2023-11-13 ENCOUNTER — OFFICE VISIT (OUTPATIENT)
Dept: AUDIOLOGY | Age: 58
End: 2023-11-13
Payer: COMMERCIAL

## 2023-11-13 DIAGNOSIS — H90.3 SENSORY HEARING LOSS, BILATERAL: ICD-10-CM

## 2023-11-13 DIAGNOSIS — H91.91 DECREASED HEARING, RIGHT: Primary | ICD-10-CM

## 2023-11-13 PROCEDURE — 92567 TYMPANOMETRY: CPT | Performed by: AUDIOLOGIST

## 2023-11-13 PROCEDURE — 92557 COMPREHENSIVE HEARING TEST: CPT | Performed by: AUDIOLOGIST

## 2023-11-13 NOTE — PROGRESS NOTES
HEARING EVALUATION    Name:  Delfin Phan  :  1965  Age:  66 y.o. Date of Evaluation: 23     History: Difficulty Understanding  Reason for visit: Delfin Phan is being seen today at the request of Dr. Brennon Banks for an initial  evaluation of hearing. Patient reports difficulty hearing from her right ear for the past 3 years. She feels as though the ear is muffled. 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 She reports occasional tinnitus, more in the right ear than left and occasionally feels off-balance. EVALUATION:    Otoscopic Evaluation:   Right Ear: Mild cerumen   Left Ear:  clear canal, Could visualize tympanic membrane    Tympanometry:   Right: Type A - normal middle ear pressure and compliance   Left: Type A - normal middle ear pressure and compliance    Audiogram Results: Moderate bilateral hearing loss from 250 Hz through 4 KHz, dropping to moderately severe to severe. SRT is in agreement with PTA bilaterally. WRS is excellent bilaterally. *see attached audiogram      RECOMMENDATIONS:  Annual hearing eval, Return to [facility]. for F/U, Hearing Aid Evaluation, and Copy to Patient/Caregiver    Patient will contact her insurance company to find out if there is a hearing aid benefit available to her, and where the benefit may be used. PATIENT EDUCATION:   Discussed results and recommendations with Patient . Questions were addressed and the patient was encouraged to contact our department should concerns arise.       Abelardo Johnson., CCC-A  Clinical Audiologist

## 2023-11-23 DIAGNOSIS — I10 PRIMARY HYPERTENSION: ICD-10-CM

## 2023-11-24 RX ORDER — LISINOPRIL 10 MG/1
10 TABLET ORAL DAILY
Qty: 30 TABLET | Refills: 1 | Status: SHIPPED | OUTPATIENT
Start: 2023-11-24

## 2023-12-01 ENCOUNTER — OFFICE VISIT (OUTPATIENT)
Dept: DENTISTRY | Facility: CLINIC | Age: 58
End: 2023-12-01

## 2023-12-01 DIAGNOSIS — K05.30 PERIODONTITIS: Primary | ICD-10-CM

## 2023-12-01 PROCEDURE — D4341 PERIODONTAL SCALING AND ROOT PLANING - 4 OR MORE TEETH PER QUADRANT: HCPCS

## 2023-12-01 NOTE — PROGRESS NOTES
SRP in quads :UL  10 mins late    Reviewed meds/hhx in [facility]  ASA: 2    CC: patient asked for just a little bit of anesthesia today. I only gave her 1/2 carp. Patient was uncomfortable during some areas of subgingival scaling but when asked for more anesthesia, she declined. Anesthesia-  Topical gel benzocaine 20% was applied to tissue. 1/2 carp 4% Septo w/ epi 1:100,000 was given via short needle. Type of injection:buccal infiltrations  Neg aspirations and no complications for all. Cavitron and hand instruments used  #16 Mobility 3  Bleeding: heavy  Supra/sub calculus was removed from tooth surfaces  Irrigated w/ .12% Chlorhexidine Gluconate    OHI: reviewed with the patient the need to maintain proper oral hygiene regimen at home. Brushing 2x/day for 2 minutes, flossing subgingivally daily and mouthrinse 1-2x/day for 60 seconds. Recommend otc pain reliever when the patient gets home prior to local anesthesia wearing off. Recommend warm salt water rinses. Recommend excellent homecare regimen especially on areas post SRP. Advised patient periodontal disease is an oral disease we can treat and maintain but cannot cure. Without proper dental visits and proper at home dental care this disease may return. Patient understands. Following all scaling and root planing, pt will return for 12 weeks perio maint. If healing is sufficient, the patient will then continue on 3 month periodontal maintenance appointments. If healing is insufficient, pt may need to be assessed for gingival flap surgery.       NV: LL SRP to finish

## 2024-01-02 PROBLEM — S00.81XA ABRASION OF FACE: Status: ACTIVE | Noted: 2024-01-02
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
## 2024-01-04 ENCOUNTER — TELEPHONE (OUTPATIENT)
Dept: FAMILY MEDICINE CLINIC | Facility: CLINIC | Age: 59
End: 2024-01-04

## 2024-01-04 NOTE — TELEPHONE ENCOUNTER
Patient left message on clinical line requesting an authorization letter for [employer] to received help shoveling the snow in front of her house. Per patient she have surgery in her hand and is unable to shovel snow. Patient state she needs this letter as soon as possible. Please review. Thank you.

## 2024-01-09 ENCOUNTER — OFFICE VISIT (OUTPATIENT)
Dept: FAMILY MEDICINE CLINIC | Facility: CLINIC | Age: 59
End: 2024-01-09

## 2024-01-09 VITALS
OXYGEN SATURATION: 100 % | RESPIRATION RATE: 18 BRPM | BODY MASS INDEX: 28.59 KG/M2 | SYSTOLIC BLOOD PRESSURE: 121 MMHG | WEIGHT: 146.4 LBS | DIASTOLIC BLOOD PRESSURE: 78 MMHG | HEART RATE: 90 BPM | TEMPERATURE: 98.1 F

## 2024-01-09 DIAGNOSIS — I10 PRIMARY HYPERTENSION: ICD-10-CM

## 2024-01-09 DIAGNOSIS — S22.32XA CLOSED FRACTURE OF ONE RIB OF LEFT SIDE, INITIAL ENCOUNTER: Primary | ICD-10-CM

## 2024-01-09 PROCEDURE — 99213 OFFICE O/P EST LOW 20 MIN: CPT | Performed by: FAMILY MEDICINE

## 2024-01-09 PROCEDURE — 3074F SYST BP LT 130 MM HG: CPT | Performed by: FAMILY MEDICINE

## 2024-01-09 PROCEDURE — 3078F DIAST BP <80 MM HG: CPT | Performed by: FAMILY MEDICINE

## 2024-01-09 RX ORDER — MELOXICAM 7.5 MG/1
7.5 TABLET ORAL DAILY
Qty: 14 TABLET | Refills: 0 | Status: SHIPPED | OUTPATIENT
Start: 2024-01-09 | End: 2024-01-23

## 2024-01-09 RX ORDER — LISINOPRIL 10 MG/1
10 TABLET ORAL DAILY
Qty: 30 TABLET | Refills: 1 | Status: SHIPPED | OUTPATIENT
Start: 2024-01-09
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
## 2024-02-06 ENCOUNTER — OFFICE VISIT (OUTPATIENT)
Dept: FAMILY MEDICINE CLINIC | Facility: CLINIC | Age: 59
End: 2024-02-06

## 2024-02-06 VITALS
DIASTOLIC BLOOD PRESSURE: 81 MMHG | HEIGHT: 60 IN | BODY MASS INDEX: 28.9 KG/M2 | HEART RATE: 88 BPM | SYSTOLIC BLOOD PRESSURE: 127 MMHG | OXYGEN SATURATION: 100 % | RESPIRATION RATE: 18 BRPM | TEMPERATURE: 97.9 F | WEIGHT: 147.2 LBS

## 2024-02-06 DIAGNOSIS — Z12.12 SCREENING FOR COLORECTAL CANCER: ICD-10-CM

## 2024-02-06 DIAGNOSIS — M54.50 ACUTE MIDLINE LOW BACK PAIN WITHOUT SCIATICA: ICD-10-CM

## 2024-02-06 DIAGNOSIS — I10 PRIMARY HYPERTENSION: Primary | ICD-10-CM

## 2024-02-06 DIAGNOSIS — Z12.11 SCREENING FOR COLORECTAL CANCER: ICD-10-CM

## 2024-02-06 PROCEDURE — 99213 OFFICE O/P EST LOW 20 MIN: CPT | Performed by: FAMILY MEDICINE

## 2024-02-06 PROCEDURE — 3079F DIAST BP 80-89 MM HG: CPT | Performed by: FAMILY MEDICINE

## 2024-02-06 PROCEDURE — 3074F SYST BP LT 130 MM HG: CPT | Performed by: FAMILY MEDICINE

## 2024-02-06 RX ORDER — METHOCARBAMOL 500 MG/1
500 TABLET, FILM COATED ORAL 3 TIMES DAILY PRN
Qty: 42 TABLET | Refills: 0 | Status: SHIPPED | OUTPATIENT
Start: 2024-02-06 | End: 2024-02-20

## 2024-02-06 NOTE — PROGRESS NOTES
Name: Karrie Mejia      : 1965      MRN: [id number]  Encounter Provider: Marquise Rosa DO  Encounter Date: 2024   Encounter department: [facility]    Assessment & Plan     1. Primary hypertension  Assessment & Plan:  - BP well controlled on lisinopril; no side effects reported   Plan   - Continue with current regimen of Lisinopril; encourage continued effort with lifestyle.       2. Screening for colorectal cancer  -     Cologuard    3. Acute midline low back pain without sciatica  Assessment & Plan:  Patient has a history of low back pain. She reports continued bilateral back pain. No GI/ incontinence.   - Trial 2 weeks of Robaxin   - Follow up as needed     Orders:  -     methocarbamol (ROBAXIN) 500 mg tablet; Take 1 tablet (500 mg total) by mouth 3 (three) times a day as needed for muscle spasms for up to 14 days           Subjective      Karrie is a 57 yo who presents for follow up for hypertension and back pain. She takes her blood pressure at home, which on average is ~ 127/80. She endorses continued bilateral back pain.     She now also reports left knee pain and chest pain upon palpation.         Review of Systems   HENT:  Negative for congestion and rhinorrhea.    Respiratory:  Negative for cough and shortness of breath.    Cardiovascular:  Positive for chest pain.   Musculoskeletal:  Positive for arthralgias.       Current Outpatient Medications on File Prior to Visit   Medication Sig    cholecalciferol (VITAMIN D3) 1,000 units tablet Take 1 tablet (1,000 Units total) by mouth daily    lisinopril (ZESTRIL) 10 mg tablet Take 1 tablet (10 mg total) by mouth daily    busPIRone (BUSPAR) 15 mg tablet take 1 tablet by mouth three times a day TOME 1 ROME HIGGINS ARJUN FOR ANXIETY (Patient not taking: Reported on 5/15/2023)    gabapentin (NEURONTIN) 300 mg capsule Take 300 mg by mouth 3 (three) times a day (Patient not taking: Reported on 5/15/2023)     meloxicam (Mobic) 7.5 mg tablet Take 1 tablet (7.5 mg total) by mouth daily for 14 days Please take with food    OLANZapine (ZyPREXA) 20 MG tablet Take 20 mg by mouth daily at bedtime (Patient not taking: Reported on 5/15/2023)    traZODone (DESYREL) 100 mg tablet Take 100 mg by mouth daily at bedtime as needed (Patient not taking: Reported on 5/15/2023)    [DISCONTINUED] methocarbamol (ROBAXIN) 500 mg tablet Take 1 tablet (500 mg total) by mouth 3 (three) times a day (Patient not taking: Reported on 1/9/2024)       Objective     /81 (BP Location: Left arm, Patient Position: Sitting, Cuff Size: Standard)   Pulse 88   Temp 97.9 °F (36.6 °C) (Temporal)   Resp 18   Ht 5' (1.524 m)   Wt 66.8 kg (147 lb 3.2 oz)   LMP 01/01/2012   SpO2 100%   BMI 28.75 kg/m²     Physical Exam  Constitutional:       Appearance: Normal appearance.   Cardiovascular:      Rate and Rhythm: Normal rate and regular rhythm.      Heart sounds: No murmur heard.     No friction rub. No gallop.   Pulmonary:      Effort: No respiratory distress.      Breath sounds: Normal breath sounds. No wheezing or rales.   Musculoskeletal:         General: Swelling and tenderness present.      Comments: Mild left knee swelling and tenderness    Neurological:      General: No focal deficit present.      Mental Status: She is alert. Mental status is at baseline.   Psychiatric:         Mood and Affect: Mood normal.         Behavior: Behavior normal.         Thought Content: Thought content normal.         Judgment: Judgment normal.       Marquise Rosa DO

## 2024-02-06 NOTE — ASSESSMENT & PLAN NOTE
- BP well controlled on lisinopril; no side effects reported   Plan   - Continue with current regimen of Lisinopril; encourage continued effort with lifestyle.

## 2024-02-06 NOTE — ASSESSMENT & PLAN NOTE
Patient has a history of low back pain. She reports continued bilateral back pain. No GI/ incontinence.   - Trial 2 weeks of Robaxin   - Follow up as needed

## 2024-02-09 ENCOUNTER — OFFICE VISIT (OUTPATIENT)
Dept: FAMILY MEDICINE CLINIC | Facility: CLINIC | Age: 59
End: 2024-02-09

## 2024-02-09 VITALS
TEMPERATURE: 98.3 F | HEART RATE: 97 BPM | SYSTOLIC BLOOD PRESSURE: 127 MMHG | OXYGEN SATURATION: 100 % | DIASTOLIC BLOOD PRESSURE: 81 MMHG | WEIGHT: 147.8 LBS | RESPIRATION RATE: 18 BRPM | BODY MASS INDEX: 29.02 KG/M2 | HEIGHT: 60 IN

## 2024-02-09 DIAGNOSIS — Z00.00 ANNUAL PHYSICAL EXAM: Primary | ICD-10-CM

## 2024-02-09 PROCEDURE — 99396 PREV VISIT EST AGE 40-64: CPT | Performed by: FAMILY MEDICINE
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
## 2024-02-09 NOTE — ASSESSMENT & PLAN NOTE
- No acute concern  - Mammogram and Dexa scheduled   - UTD with immunization   - Meds reviewed and confirmed

## 2024-02-15 ENCOUNTER — TELEPHONE (OUTPATIENT)
Dept: FAMILY MEDICINE CLINIC | Facility: CLINIC | Age: 59
End: 2024-02-15

## 2024-02-15 NOTE — TELEPHONE ENCOUNTER
Hi, [facility]. I'm just following up on a patient Karrie Stevens, date of birth of [birth year]. She is requesting to have a DEXA scan done, but I still haven't gotten the script yet. I'm so looking just to see if I can have that faxed over my Direct Line. If you have any questions will be [phone]. My name is Kylah. My fax number is [phone]. Thank you.     [facility] was sent and confirmation received the order does look like it was received in [facility] will refax, and have confirmation scanned

## 2024-02-21 PROBLEM — Z12.12 SCREENING FOR COLORECTAL CANCER: Status: RESOLVED | Noted: 2024-02-06 | Resolved: 2024-02-21

## 2024-02-21 PROBLEM — Z12.11 SCREENING FOR COLORECTAL CANCER: Status: RESOLVED | Noted: 2024-02-06 | Resolved: 2024-02-21
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
## 2024-02-24 LAB — COLOGUARD RESULT REPORTABLE: NEGATIVE

## 2024-03-11 DIAGNOSIS — I10 PRIMARY HYPERTENSION: ICD-10-CM

## 2024-03-11 RX ORDER — LISINOPRIL 10 MG/1
10 TABLET ORAL DAILY
Qty: 30 TABLET | Refills: 1 | Status: SHIPPED | OUTPATIENT
Start: 2024-03-11

## 2024-05-11 DIAGNOSIS — I10 PRIMARY HYPERTENSION: ICD-10-CM

## 2024-05-13 RX ORDER — LISINOPRIL 10 MG/1
10 TABLET ORAL DAILY
Qty: 30 TABLET | Refills: 1 | Status: SHIPPED | OUTPATIENT
Start: 2024-05-13

## 2024-05-29 ENCOUNTER — HOSPITAL ENCOUNTER (OUTPATIENT)
Dept: MAMMOGRAPHY | Facility: CLINIC | Age: 59
Discharge: HOME | End: 2024-05-29
Payer: COMMERCIAL

## 2024-05-29 VITALS — WEIGHT: 147 LBS | BODY MASS INDEX: 28.86 KG/M2 | HEIGHT: 60 IN

## 2024-05-29 DIAGNOSIS — Z12.31 ENCOUNTER FOR SCREENING MAMMOGRAM FOR MALIGNANT NEOPLASM OF BREAST: ICD-10-CM

## 2024-05-29 PROCEDURE — 77063 BREAST TOMOSYNTHESIS BI: CPT

## 2024-05-29 PROCEDURE — 77067 SCR MAMMO BI INCL CAD: CPT

## 2024-06-20 ENCOUNTER — TELEPHONE (OUTPATIENT)
Dept: FAMILY MEDICINE CLINIC | Facility: CLINIC | Age: 59
End: 2024-06-20

## 2024-06-20 ENCOUNTER — OFFICE VISIT (OUTPATIENT)
Dept: FAMILY MEDICINE CLINIC | Facility: CLINIC | Age: 59
End: 2024-06-20

## 2024-06-20 VITALS
BODY MASS INDEX: 28.9 KG/M2 | RESPIRATION RATE: 18 BRPM | SYSTOLIC BLOOD PRESSURE: 122 MMHG | WEIGHT: 147.2 LBS | TEMPERATURE: 98.8 F | HEART RATE: 91 BPM | HEIGHT: 60 IN | DIASTOLIC BLOOD PRESSURE: 77 MMHG

## 2024-06-20 DIAGNOSIS — G89.4 CHRONIC PAIN DISORDER: Primary | ICD-10-CM

## 2024-06-20 PROCEDURE — 99213 OFFICE O/P EST LOW 20 MIN: CPT | Performed by: FAMILY MEDICINE
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
## 2024-06-20 NOTE — TELEPHONE ENCOUNTER
Signature required.    Folder (to be completed by): Dr. Rosa    Name of Form: Back pain assessment      Color Folder: Pt brought to appt    Form to be faxed to: Pt will collect at appt

## 2024-07-12 DIAGNOSIS — I10 PRIMARY HYPERTENSION: ICD-10-CM

## 2024-07-23 DIAGNOSIS — I10 PRIMARY HYPERTENSION: ICD-10-CM

## 2024-07-23 RX ORDER — LISINOPRIL 10 MG/1
10 TABLET ORAL DAILY
Qty: 30 TABLET | Refills: 1 | OUTPATIENT
Start: 2024-07-23

## 2024-07-23 RX ORDER — LISINOPRIL 10 MG/1
10 TABLET ORAL DAILY
Qty: 30 TABLET | Refills: 1 | Status: SHIPPED | OUTPATIENT
Start: 2024-07-23

## 2024-08-07 DIAGNOSIS — I10 PRIMARY HYPERTENSION: ICD-10-CM

## 2024-08-07 RX ORDER — LISINOPRIL 10 MG/1
10 TABLET ORAL DAILY
Qty: 30 TABLET | Refills: 1 | Status: SHIPPED | OUTPATIENT
Start: 2024-08-07

## 2024-08-22 ENCOUNTER — OFFICE VISIT (OUTPATIENT)
Dept: FAMILY MEDICINE CLINIC | Facility: CLINIC | Age: 59
End: 2024-08-22

## 2024-08-22 VITALS
SYSTOLIC BLOOD PRESSURE: 107 MMHG | HEART RATE: 85 BPM | OXYGEN SATURATION: 99 % | HEIGHT: 60 IN | RESPIRATION RATE: 20 BRPM | DIASTOLIC BLOOD PRESSURE: 67 MMHG | TEMPERATURE: 98.4 F | WEIGHT: 147 LBS | BODY MASS INDEX: 28.86 KG/M2

## 2024-08-22 DIAGNOSIS — E66.3 OVERWEIGHT: Primary | ICD-10-CM

## 2024-08-22 DIAGNOSIS — I10 PRIMARY HYPERTENSION: ICD-10-CM

## 2024-08-22 PROCEDURE — 3078F DIAST BP <80 MM HG: CPT

## 2024-08-22 PROCEDURE — 99213 OFFICE O/P EST LOW 20 MIN: CPT

## 2024-08-22 PROCEDURE — 3074F SYST BP LT 130 MM HG: CPT

## 2024-08-22 RX ORDER — LISINOPRIL 10 MG/1
10 TABLET ORAL DAILY
Qty: 30 TABLET | Refills: 1 | Status: SHIPPED | OUTPATIENT
Start: 2024-08-22

## 2024-08-22 NOTE — ASSESSMENT & PLAN NOTE
BMI Counseling: Body mass index is 28.71 kg/m². The BMI is above normal. Nutrition recommendations include reducing portion sizes and decreasing overall calorie intake. Exercise recommendations include moderate aerobic physical activity for 150 minutes/week.

## 2024-08-22 NOTE — PROGRESS NOTES
Ambulatory Visit  Name: Karrie Mejia      : 1965      MRN: [id number]  Encounter Provider: Marquise Rosa DO  Encounter Date: 2024   Encounter department: [facility]    Assessment & Plan   1. Overweight  Assessment & Plan:  BMI Counseling: Body mass index is 28.71 kg/m². The BMI is above normal. Nutrition recommendations include reducing portion sizes and decreasing overall calorie intake. Exercise recommendations include moderate aerobic physical activity for 150 minutes/week.    2. Primary hypertension  Assessment & Plan:  - Well controlled, continue with Lisinopril   - Lifestyle modification discussed.       Orders:  -     lisinopril (ZESTRIL) 10 mg tablet; Take 1 tablet (10 mg total) by mouth daily       History of Present Illness     HPI  59 years old female present today for blood pressure follow-up.  Blood pressure has been stable on lisinopril daily.  No acute concern.  No change in vision, no headache chest pain.  Medication refilled today.  Review of Systems   Constitutional:  Negative for chills and fever.   HENT:  Negative for ear pain and sore throat.    Eyes:  Negative for pain and visual disturbance.   Respiratory:  Negative for cough and shortness of breath.    Cardiovascular:  Negative for chest pain and palpitations.   Gastrointestinal:  Negative for abdominal pain and vomiting.   Genitourinary:  Negative for dysuria and hematuria.   Musculoskeletal:  Negative for arthralgias and back pain.   Skin:  Negative for color change and rash.   Neurological:  Negative for seizures and syncope.   All other systems reviewed and are negative.      Objective     /67   Pulse 85   Temp 98.4 °F (36.9 °C) (Temporal)   Resp 20   Ht 5' (1.524 m)   Wt 66.7 kg (147 lb)   LMP 2012   SpO2 99%   BMI 28.71 kg/m²     Physical Exam  Vitals and nursing note reviewed.   Constitutional:       General: She is not in acute distress.     Appearance: She is  well-developed.   HENT:      Head: Normocephalic and atraumatic.   Eyes:      Conjunctiva/sclera: Conjunctivae normal.   Cardiovascular:      Rate and Rhythm: Normal rate and regular rhythm.      Heart sounds: No murmur heard.  Pulmonary:      Effort: Pulmonary effort is normal. No respiratory distress.      Breath sounds: Normal breath sounds.   Abdominal:      Palpations: Abdomen is soft.      Tenderness: There is no abdominal tenderness.   Musculoskeletal:         General: No swelling.      Cervical back: Neck supple.   Skin:     General: Skin is warm and dry.      Capillary Refill: Capillary refill takes less than 2 seconds.   Neurological:      Mental Status: She is alert.   Psychiatric:         Mood and Affect: Mood normal.       Administrative Statements

## 2024-08-22 NOTE — PATIENT INSTRUCTIONS
Lifestyle Modification Goals for NEXT Visit:      Aerobic Exercise  (a) ADD 20min or ~2000 steps of walking per DAY from what you are currently doing (most smartphones have a step counter).   Keep a log below!    DASH Diet  (a) HALF/DECREASE your portions of meat, substitute with DOUBLED/INCREASED vegetables  TIP: natural fruit juice still has sugar; sugar is sugar!  Keep a log below!    Sodium Restriction  (a) LIMIT to 1500mg sodium per day (3/4 teaspoon table salt per day = 1.5 lata spring bottle caps)  TIP: deli meats, canned foods have high salt content  Keep a log below!

## 2024-11-08 DIAGNOSIS — I10 PRIMARY HYPERTENSION: ICD-10-CM

## 2024-11-08 RX ORDER — LISINOPRIL 10 MG/1
10 TABLET ORAL DAILY
Qty: 30 TABLET | Refills: 1 | Status: SHIPPED | OUTPATIENT
Start: 2024-11-08 | End: 2024-11-11 | Stop reason: SDUPTHER

## 2024-11-11 DIAGNOSIS — I10 PRIMARY HYPERTENSION: ICD-10-CM

## 2024-11-11 RX ORDER — LISINOPRIL 10 MG/1
10 TABLET ORAL DAILY
Qty: 90 TABLET | Refills: 0 | Status: SHIPPED | OUTPATIENT
Start: 2024-11-11

## 2024-11-11 NOTE — TELEPHONE ENCOUNTER
This was originally submitted on 11/8/24, insurance requesting 90 day supply. Entered new Rx for sign off if you approve.

## 2025-02-01 DIAGNOSIS — I10 PRIMARY HYPERTENSION: ICD-10-CM

## 2025-02-03 RX ORDER — LISINOPRIL 10 MG/1
10 TABLET ORAL DAILY
Qty: 90 TABLET | Refills: 0 | Status: SHIPPED | OUTPATIENT
Start: 2025-02-03

## 2025-02-27 ENCOUNTER — OFFICE VISIT (OUTPATIENT)
Dept: FAMILY MEDICINE CLINIC | Facility: CLINIC | Age: 60
End: 2025-02-27

## 2025-02-27 VITALS
HEART RATE: 94 BPM | TEMPERATURE: 98.4 F | WEIGHT: 147 LBS | DIASTOLIC BLOOD PRESSURE: 77 MMHG | OXYGEN SATURATION: 98 % | HEIGHT: 60 IN | RESPIRATION RATE: 18 BRPM | SYSTOLIC BLOOD PRESSURE: 125 MMHG | BODY MASS INDEX: 28.86 KG/M2

## 2025-02-27 DIAGNOSIS — G89.29 ACUTE ON CHRONIC LOW BACK PAIN: ICD-10-CM

## 2025-02-27 DIAGNOSIS — M54.50 ACUTE ON CHRONIC LOW BACK PAIN: ICD-10-CM

## 2025-02-27 DIAGNOSIS — Z00.00 ANNUAL PHYSICAL EXAM: Primary | ICD-10-CM

## 2025-02-27 PROCEDURE — 99396 PREV VISIT EST AGE 40-64: CPT

## 2025-02-27 RX ORDER — METHOCARBAMOL 500 MG/1
500 TABLET, FILM COATED ORAL 3 TIMES DAILY PRN
Qty: 42 TABLET | Refills: 0 | Status: SHIPPED | OUTPATIENT
Start: 2025-02-27 | End: 2025-03-13

## 2025-02-27 NOTE — PATIENT INSTRUCTIONS
"Patient Education     Routine physical for adults   The Basics   Written by the doctors and editors at [facility]   What is a physical? -- A physical is a routine visit, or \"check-up,\" with your doctor. You might also hear it called a \"wellness visit\" or \"preventive visit.\"  During each visit, the doctor will:   Ask about your physical and mental health   Ask about your habits, behaviors, and lifestyle   Do an exam   Give you vaccines if needed   Talk to you about any medicines you take   Give advice about your health   Answer your questions  Getting regular check-ups is an important part of taking care of your health. It can help your doctor find and treat any problems you have. But it's also important for preventing health problems.  A routine physical is different from a \"sick visit.\" A sick visit is when you see a doctor because of a health concern or problem. Since physicals are scheduled ahead of time, you can think about what you want to ask the doctor.  How often should I get a physical? -- It depends on your age and health. In general, for people age 21 years and older:   If you are younger than 50 years, you might be able to get a physical every 3 years.   If you are 50 years or older, your doctor might recommend a physical every year.  If you have an ongoing health condition, like diabetes or high blood pressure, your doctor will probably want to see you more often.  What happens during a physical? -- In general, each visit will include:   Physical exam - The doctor or nurse will check your height, weight, heart rate, and blood pressure. They will also look at your eyes and ears. They will ask about how you are feeling and whether you have any symptoms that bother you.   Medicines - It's a good idea to bring a list of all the medicines you take to each doctor visit. Your doctor will talk to you about your medicines and answer any questions. Tell them if you are having any side effects that bother you. You " "should also tell them if you are having trouble paying for any of your medicines.   Habits and behaviors - This includes:   Your diet   Your exercise habits   Whether you smoke, drink alcohol, or use drugs   Whether you are sexually active   Whether you feel safe at home  Your doctor will talk to you about things you can do to improve your health and lower your risk of health problems. They will also offer help and support. For example, if you want to quit smoking, they can give you advice and might prescribe medicines. If you want to improve your diet or get more physical activity, they can help you with this, too.   Lab tests, if needed - The tests you get will depend on your age and situation. For example, your doctor might want to check your:   Cholesterol   Blood sugar   Iron level   Vaccines - The recommended vaccines will depend on your age, health, and what vaccines you already had. Vaccines are very important because they can prevent certain serious or deadly infections.   Discussion of screening - \"Screening\" means checking for diseases or other health problems before they cause symptoms. Your doctor can recommend screening based on your age, risk, and preferences. This might include tests to check for:   Cancer, such as breast, prostate, cervical, ovarian, colorectal, prostate, lung, or skin cancer   Sexually transmitted infections, such as chlamydia and gonorrhea   Mental health conditions like depression and anxiety  Your doctor will talk to you about the different types of screening tests. They can help you decide which screenings to have. They can also explain what the results might mean.   Answering questions - The physical is a good time to ask the doctor or nurse questions about your health. If needed, they can refer you to other doctors or specialists, too.  Adults older than 65 years often need other care, too. As you get older, your doctor will talk to you about:   How to prevent falling at " home   Hearing or vision tests   Memory testing   How to take your medicines safely   Making sure that you have the help and support you need at home  All topics are updated as new evidence becomes available and our peer review process is complete.  This topic retrieved from [employer] on: May 02, 2024.  Topic 421614 Version 1.0  Release: 32.4.3 - C32.122  © 2024 UpToDate, Inc. and/or its affiliates. All rights reserved.  Consumer Information Use and Disclaimer   Disclaimer: This generalized information is a limited summary of diagnosis, treatment, and/or medication information. It is not meant to be comprehensive and should be used as a tool to help the user understand and/or assess potential diagnostic and treatment options. It does NOT include all information about conditions, treatments, medications, side effects, or risks that may apply to a specific patient. It is not intended to be medical advice or a substitute for the medical advice, diagnosis, or treatment of a health care provider based on the health care provider's examination and assessment of a patient's specific and unique circumstances. Patients must speak with a health care provider for complete information about their health, medical questions, and treatment options, including any risks or benefits regarding use of medications. This information does not endorse any treatments or medications as safe, effective, or approved for treating a specific patient. UpToDate, Inc. and its affiliates disclaim any warranty or liability relating to this information or the use thereof.The use of this information is governed by the Terms of Use, available at https://www.woltersgulu.comuwer.com/en/know/clinical-effectiveness-terms. 2024© UpToDate, Inc. and its affiliates and/or licensors. All rights reserved.  Copyright   © 2024 UpToDate, Inc. and/or its affiliates. All rights reserved.

## 2025-02-27 NOTE — ASSESSMENT & PLAN NOTE
Orders:    methocarbamol (ROBAXIN) 500 mg tablet; Take 1 tablet (500 mg total) by mouth 3 (three) times a day as needed for muscle spasms for up to 14 days

## 2025-02-27 NOTE — PROGRESS NOTES
Adult Annual Physical  Name: Karrie Mejia      : 1965      MRN: [id number]  Encounter Provider: Marquise Rosa DO  Encounter Date: 2025   Encounter department: [facility]    Assessment & Plan  Annual physical exam  No acute concern, request refill for muscle relaxer for chronic back spasm          Acute on chronic low back pain    Orders:    methocarbamol (ROBAXIN) 500 mg tablet; Take 1 tablet (500 mg total) by mouth 3 (three) times a day as needed for muscle spasms for up to 14 days    Immunizations and preventive care screenings were discussed with patient today. Appropriate education was printed on patient's after visit summary.    Counseling:  Exercise: the importance of regular exercise/physical activity was discussed. Recommend exercise 3-5 times per week for at least 30 minutes.     BMI Counseling: Body mass index is 28.71 kg/m². The BMI is above normal. Nutrition recommendations include decreasing portion sizes, decreasing fast food intake and consuming healthier snacks. Exercise recommendations include moderate physical activity 150 minutes/week. Rationale for BMI follow-up plan is due to patient being overweight or obese.     Depression Screening and Follow-up Plan: Patient was screened for depression during today's encounter. They screened negative with a PHQ-9 score of 0.          History of Present Illness     Adult Annual Physical:  Patient presents for annual physical.     Diet and Physical Activity:  - Diet/Nutrition: well balanced diet.  - Exercise: no formal exercise.    Depression Screening:    - PHQ-9 Score: 0    General Health:  - Sleep: sleeps well.  - Hearing: normal hearing bilateral ears.  - Vision: no vision problems.  - Dental: regular dental visits.    Review of Systems   Constitutional:  Negative for chills and fever.   HENT:  Negative for ear pain and sore throat.    Eyes:  Negative for pain and visual disturbance.   Respiratory:   Negative for cough and shortness of breath.    Cardiovascular:  Negative for chest pain and palpitations.   Gastrointestinal:  Negative for abdominal pain and vomiting.   Genitourinary:  Negative for dysuria and hematuria.   Musculoskeletal:  Negative for arthralgias and back pain.   Skin:  Negative for color change and rash.   Neurological:  Negative for seizures and syncope.   All other systems reviewed and are negative.        Objective   /77   Pulse 94   Temp 98.4 °F (36.9 °C) (Temporal)   Resp 18   Ht 5' (1.524 m)   Wt 66.7 kg (147 lb)   LMP 01/01/2012   SpO2 98%   BMI 28.71 kg/m²     Physical Exam  Vitals and nursing note reviewed.   Constitutional:       General: She is not in acute distress.     Appearance: She is well-developed.   HENT:      Head: Normocephalic and atraumatic.   Eyes:      Conjunctiva/sclera: Conjunctivae normal.   Cardiovascular:      Rate and Rhythm: Normal rate and regular rhythm.      Heart sounds: No murmur heard.  Pulmonary:      Effort: Pulmonary effort is normal. No respiratory distress.      Breath sounds: Normal breath sounds.   Abdominal:      Palpations: Abdomen is soft.      Tenderness: There is no abdominal tenderness.   Musculoskeletal:         General: No swelling.      Cervical back: Neck supple.   Skin:     General: Skin is warm and dry.      Capillary Refill: Capillary refill takes less than 2 seconds.   Neurological:      Mental Status: She is alert.   Psychiatric:         Mood and Affect: Mood normal.

## 2025-05-02 DIAGNOSIS — I10 PRIMARY HYPERTENSION: ICD-10-CM

## 2025-05-02 RX ORDER — LISINOPRIL 10 MG/1
10 TABLET ORAL DAILY
Qty: 90 TABLET | Refills: 0 | Status: SHIPPED | OUTPATIENT
Start: 2025-05-02

## 2025-05-09 ENCOUNTER — HOSPITAL ENCOUNTER (EMERGENCY)
Facility: HOSPITAL | Age: 60
Discharge: HOME | End: 2025-05-09
Attending: EMERGENCY MEDICINE
Payer: COMMERCIAL

## 2025-05-09 ENCOUNTER — TELEPHONE (OUTPATIENT)
Dept: FAMILY MEDICINE CLINIC | Facility: CLINIC | Age: 60
End: 2025-05-09

## 2025-05-09 VITALS
RESPIRATION RATE: 17 BRPM | OXYGEN SATURATION: 100 % | WEIGHT: 143.08 LBS | DIASTOLIC BLOOD PRESSURE: 72 MMHG | BODY MASS INDEX: 27.94 KG/M2 | SYSTOLIC BLOOD PRESSURE: 149 MMHG | TEMPERATURE: 97.8 F | HEART RATE: 84 BPM

## 2025-05-09 DIAGNOSIS — M54.9 BACK PAIN: Primary | ICD-10-CM

## 2025-05-09 DIAGNOSIS — R42 DIZZINESS: ICD-10-CM

## 2025-05-09 LAB
ANION GAP SERPL CALCULATED.3IONS-SCNC: 7 MMOL/L (ref 4–13)
BASOPHILS # BLD AUTO: 0.02 THOUSANDS/ÂΜL (ref 0–0.1)
BASOPHILS NFR BLD AUTO: 0 % (ref 0–1)
BUN SERPL-MCNC: 11 MG/DL (ref 5–25)
CALCIUM SERPL-MCNC: 9.2 MG/DL (ref 8.4–10.2)
CARDIAC TROPONIN I PNL SERPL HS: <2 NG/L (ref ?–50)
CHLORIDE SERPL-SCNC: 108 MMOL/L (ref 96–108)
CO2 SERPL-SCNC: 25 MMOL/L (ref 21–32)
CREAT SERPL-MCNC: 0.59 MG/DL (ref 0.6–1.3)
EOSINOPHIL # BLD AUTO: 0.02 THOUSAND/ÂΜL (ref 0–0.61)
EOSINOPHIL NFR BLD AUTO: 0 % (ref 0–6)
ERYTHROCYTE [DISTWIDTH] IN BLOOD BY AUTOMATED COUNT: 12.4 % (ref 11.6–15.1)
GFR SERPL CREATININE-BSD FRML MDRD: 101 ML/MIN/1.73SQ M
GLUCOSE SERPL-MCNC: 100 MG/DL (ref 65–140)
HCT VFR BLD AUTO: 40.9 % (ref 34.8–46.1)
HGB BLD-MCNC: 13.9 G/DL (ref 11.5–15.4)
IMM GRANULOCYTES # BLD AUTO: 0.03 THOUSAND/UL (ref 0–0.2)
IMM GRANULOCYTES NFR BLD AUTO: 0 % (ref 0–2)
LYMPHOCYTES # BLD AUTO: 1.46 THOUSANDS/ÂΜL (ref 0.6–4.47)
LYMPHOCYTES NFR BLD AUTO: 16 % (ref 14–44)
MCH RBC QN AUTO: 28.5 PG (ref 26.8–34.3)
MCHC RBC AUTO-ENTMCNC: 34 G/DL (ref 31.4–37.4)
MCV RBC AUTO: 84 FL (ref 82–98)
MONOCYTES # BLD AUTO: 0.32 THOUSAND/ÂΜL (ref 0.17–1.22)
MONOCYTES NFR BLD AUTO: 3 % (ref 4–12)
NEUTROPHILS # BLD AUTO: 7.52 THOUSANDS/ÂΜL (ref 1.85–7.62)
NEUTS SEG NFR BLD AUTO: 81 % (ref 43–75)
NRBC BLD AUTO-RTO: 0 /100 WBCS
PLATELET # BLD AUTO: 207 THOUSANDS/UL (ref 149–390)
PMV BLD AUTO: 8.6 FL (ref 8.9–12.7)
POTASSIUM SERPL-SCNC: 4.3 MMOL/L (ref 3.5–5.3)
RBC # BLD AUTO: 4.87 MILLION/UL (ref 3.81–5.12)
SODIUM SERPL-SCNC: 140 MMOL/L (ref 135–147)
TSH SERPL DL<=0.05 MIU/L-ACNC: 1.5 UIU/ML (ref 0.45–4.5)
WBC # BLD AUTO: 9.37 THOUSAND/UL (ref 4.31–10.16)

## 2025-05-09 PROCEDURE — 84443 ASSAY THYROID STIM HORMONE: CPT | Performed by: PHYSICIAN ASSISTANT

## 2025-05-09 PROCEDURE — 84484 ASSAY OF TROPONIN QUANT: CPT | Performed by: PHYSICIAN ASSISTANT

## 2025-05-09 PROCEDURE — 36415 COLL VENOUS BLD VENIPUNCTURE: CPT | Performed by: PHYSICIAN ASSISTANT

## 2025-05-09 PROCEDURE — 99284 EMERGENCY DEPT VISIT MOD MDM: CPT

## 2025-05-09 PROCEDURE — 99285 EMERGENCY DEPT VISIT HI MDM: CPT | Performed by: PHYSICIAN ASSISTANT

## 2025-05-09 PROCEDURE — 85025 COMPLETE CBC W/AUTO DIFF WBC: CPT | Performed by: PHYSICIAN ASSISTANT

## 2025-05-09 PROCEDURE — 93005 ELECTROCARDIOGRAM TRACING: CPT

## 2025-05-09 PROCEDURE — 80048 BASIC METABOLIC PNL TOTAL CA: CPT | Performed by: PHYSICIAN ASSISTANT

## 2025-05-09 RX ORDER — MECLIZINE HYDROCHLORIDE 25 MG/1
25 TABLET ORAL ONCE
Status: DISCONTINUED | OUTPATIENT
Start: 2025-05-09 | End: 2025-05-09 | Stop reason: HOSPADM

## 2025-05-09 RX ORDER — ACETAMINOPHEN 10 MG/ML
1000 INJECTION, SOLUTION INTRAVENOUS ONCE
Status: DISCONTINUED | OUTPATIENT
Start: 2025-05-09 | End: 2025-05-09 | Stop reason: HOSPADM

## 2025-05-09 RX ORDER — CYCLOBENZAPRINE HCL 10 MG
10 TABLET ORAL 2 TIMES DAILY PRN
Qty: 20 TABLET | Refills: 0 | Status: SHIPPED | OUTPATIENT
Start: 2025-05-09

## 2025-05-09 RX ORDER — MECLIZINE HYDROCHLORIDE 25 MG/1
25 TABLET ORAL 3 TIMES DAILY PRN
Qty: 30 TABLET | Refills: 0 | Status: SHIPPED | OUTPATIENT
Start: 2025-05-09

## 2025-05-09 RX ORDER — DIAZEPAM 10 MG/2ML
2.5 INJECTION, SOLUTION INTRAMUSCULAR; INTRAVENOUS ONCE
Status: DISCONTINUED | OUTPATIENT
Start: 2025-05-09 | End: 2025-05-09 | Stop reason: HOSPADM

## 2025-05-09 NOTE — TELEPHONE ENCOUNTER
Patient contact our office to request a change on her primary care provider Dr. Rosa. Per patient she will like to change with Dr. Mejia. Upcoming appointment in August for 6 months follow up changed to Dr. Mejia.   
pancytopenia

## 2025-05-09 NOTE — DISCHARGE INSTRUCTIONS
Please refer to the attached information for strict return instructions. If symptoms worsen or new symptoms develop please return to the ER. Please follow up with your primary care physician as well as with our spine program for your back pain.    Consulte la información adjunta para obtener instrucciones estrictas sobre la devolución. Si los síntomas empeoran o aparecen nuevos, regrese a urgencias. Consulte con [facility] y con nuestro programa de [facility] para el dolor de espalda.
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
## 2025-05-12 ENCOUNTER — TELEPHONE (OUTPATIENT)
Dept: PHYSICAL THERAPY | Facility: OTHER | Age: 60
End: 2025-05-12

## 2025-05-12 LAB
ATRIAL RATE: 92 BPM
P AXIS: 67 DEGREES
PR INTERVAL: 144 MS
QRS AXIS: 70 DEGREES
QRSD INTERVAL: 68 MS
QT INTERVAL: 376 MS
QTC INTERVAL: 466 MS
T WAVE AXIS: 44 DEGREES
VENTRICULAR RATE: 92 BPM

## 2025-05-12 PROCEDURE — 93010 ELECTROCARDIOGRAM REPORT: CPT | Performed by: INTERNAL MEDICINE

## 2025-05-12 NOTE — TELEPHONE ENCOUNTER
Call placed to the patient per [facility] referral.    Voice message left for patient to call back. Phone number and hours of business provided.     This is the 1st attempt to reach the patient.  Will defer per protocol.    I.S#[id number] Corie

## 2025-05-23 NOTE — TELEPHONE ENCOUNTER
Call placed to the patient per [facility] referral.     V/M left in [place] for patient to call back. Phone number and hours of business provided.      This is the 2nd attempt to reach the patient.  Will close referral per protocol.

## 2025-06-10 NOTE — TELEPHONE ENCOUNTER
Patient called to request we send records- to [facility] so she can schedule an appointment with Rheumatology  Fax# [phone]  Faxed records and order  [FreeTextEntry1] : Pt. presents for blood work for overall healthcare maintenance as prescribed by his physician. The patient denies fevers, chills, dizziness, fatigue, lightheadedness, loss of appetite.

## (undated) DEVICE — DRAPE C-ARM X-RAY

## (undated) DEVICE — CHLORAPREP HI-LITE 26ML ORANGE

## (undated) DEVICE — SUT VICRYL 3-0 SH 27 IN J416H

## (undated) DEVICE — DRILL 1.8 X 90MM AKA

## (undated) DEVICE — KNIFE LIGHT 10,PK: Brand: KNIFELIGHT

## (undated) DEVICE — CUFF TOURNIQUET 18 X 4 IN QUICK CONNECT DISP 1 BLADDER

## (undated) DEVICE — GLOVE SRG BIOGEL 7

## (undated) DEVICE — ACE WRAP 3 IN STERILE

## (undated) DEVICE — INTENDED FOR TISSUE SEPARATION, AND OTHER PROCEDURES THAT REQUIRE A SHARP SURGICAL BLADE TO PUNCTURE OR CUT.: Brand: BARD-PARKER ® CARBON RIB-BACK BLADES

## (undated) DEVICE — ADHESIVE SKIN HIGH VISCOSITY EXOFIN 1ML

## (undated) DEVICE — GLOVE INDICATOR PI UNDERGLOVE SZ 7 BLUE

## (undated) DEVICE — DISPOSABLE EQUIPMENT COVER: Brand: SMALL TOWEL DRAPE

## (undated) DEVICE — MINI BLADE ROUND TIP SHARP ON ONE SIDE

## (undated) DEVICE — ARM SLING: Brand: DEROYAL

## (undated) DEVICE — NEEDLE 25G X 1 1/2

## (undated) DEVICE — PADDING CAST 4 IN  COTTON STRL

## (undated) DEVICE — K-WIRE FIXATION 1.1 X 100MM SS
Type: IMPLANTABLE DEVICE | Site: WRIST | Status: NON-FUNCTIONAL
Removed: 2020-11-13

## (undated) DEVICE — 3M™ STERI-STRIP™ REINFORCED ADHESIVE SKIN CLOSURES, R1546, 1/4 IN X 4 IN (6 MM X 100 MM), 10 STRIPS/ENVELOPE: Brand: 3M™ STERI-STRIP™

## (undated) DEVICE — STERILE BETHLEHEM PLASTIC HAND: Brand: CARDINAL HEALTH

## (undated) DEVICE — IMPLANTABLE DEVICE: Type: IMPLANTABLE DEVICE | Site: WRIST | Status: NON-FUNCTIONAL

## (undated) DEVICE — GAUZE SPONGES,16 PLY: Brand: CURITY

## (undated) DEVICE — SPONGE PVP SCRUB WING STERILE